# Patient Record
Sex: MALE | Race: BLACK OR AFRICAN AMERICAN | Employment: OTHER | URBAN - METROPOLITAN AREA
[De-identification: names, ages, dates, MRNs, and addresses within clinical notes are randomized per-mention and may not be internally consistent; named-entity substitution may affect disease eponyms.]

---

## 2017-01-05 ENCOUNTER — OFFICE VISIT (OUTPATIENT)
Dept: INTERNAL MEDICINE CLINIC | Age: 67
End: 2017-01-05

## 2017-01-05 VITALS
SYSTOLIC BLOOD PRESSURE: 146 MMHG | DIASTOLIC BLOOD PRESSURE: 78 MMHG | HEART RATE: 62 BPM | WEIGHT: 168 LBS | OXYGEN SATURATION: 98 % | BODY MASS INDEX: 24.88 KG/M2 | HEIGHT: 69 IN

## 2017-01-05 DIAGNOSIS — R94.31 EKG ABNORMALITY: ICD-10-CM

## 2017-01-05 DIAGNOSIS — N40.0 BENIGN NODULAR PROSTATIC HYPERPLASIA WITHOUT LOWER URINARY TRACT SYMPTOMS: ICD-10-CM

## 2017-01-05 DIAGNOSIS — Z13.31 SCREENING FOR DEPRESSION: ICD-10-CM

## 2017-01-05 DIAGNOSIS — J01.00 SUBACUTE MAXILLARY SINUSITIS: Primary | ICD-10-CM

## 2017-01-05 DIAGNOSIS — Z23 ENCOUNTER FOR IMMUNIZATION: ICD-10-CM

## 2017-01-05 DIAGNOSIS — E78.00 ELEVATED CHOLESTEROL: ICD-10-CM

## 2017-01-05 DIAGNOSIS — Z11.59 NEED FOR HEPATITIS C SCREENING TEST: ICD-10-CM

## 2017-01-05 DIAGNOSIS — Z12.5 SPECIAL SCREENING FOR MALIGNANT NEOPLASM OF PROSTATE: ICD-10-CM

## 2017-01-05 DIAGNOSIS — R06.00 DYSPNEA, UNSPECIFIED TYPE: ICD-10-CM

## 2017-01-05 RX ORDER — AMOXICILLIN 875 MG/1
875 TABLET, FILM COATED ORAL 2 TIMES DAILY
Qty: 20 TAB | Refills: 0 | Status: SHIPPED | OUTPATIENT
Start: 2017-01-05 | End: 2017-01-15

## 2017-01-05 NOTE — MR AVS SNAPSHOT
Visit Information Date & Time Provider Department Dept. Phone Encounter #  
 1/5/2017  9:00 AM Robert Musa MD [facility] Medicine and Primary Care [phone] [id number] Follow-up Instructions Return if symptoms worsen or fail to improve. Upcoming Health Maintenance Date Due Hepatitis C Screening 1950 DTaP/Tdap/Td series (1 - Tdap) 5/3/1971 FOBT Q 1 YEAR AGE 50-75 5/3/2000 ZOSTER VACCINE AGE 60> 5/3/2010 GLAUCOMA SCREENING Q2Y 5/3/2015 Pneumococcal 65+ Low/Medium Risk (1 of 2 - PCV13) 5/3/2015 MEDICARE YEARLY EXAM 5/3/2015 INFLUENZA AGE 9 TO ADULT 8/1/2016 Allergies as of 1/5/2017  Review Complete On: 1/5/2017 By: Robert Musa MD  
 No Known Allergies Current Immunizations  Never Reviewed Name Date Influenza High Dose Vaccine PF 1/5/2017 Not reviewed this visit You Were Diagnosed With   
  
 Codes Comments Routine general medical examination at a health care facility    -  Primary ICD-10-CM: Z00.00 ICD-9-CM: V70.0 Need for hepatitis C screening test     ICD-10-CM: Z11.59 
ICD-9-CM: V73.89 Screening for alcoholism     ICD-10-CM: Z13.89 ICD-9-CM: V79.1 Screening for depression     ICD-10-CM: Z13.89 ICD-9-CM: V79.0 Screen for colon cancer     ICD-10-CM: Z12.11 ICD-9-CM: V76.51 Screening for diabetes mellitus     ICD-10-CM: Z13.1 ICD-9-CM: V77.1 Screening for ischemic heart disease     ICD-10-CM: Z13.6 ICD-9-CM: V81.0 Special screening for malignant neoplasm of prostate     ICD-10-CM: Z12.5 ICD-9-CM: V76.44 Subacute maxillary sinusitis     ICD-10-CM: J01.00 ICD-9-CM: 461.0 Encounter for immunization     ICD-10-CM: E37 ICD-9-CM: V03.89 Vitals BP Pulse Height(growth percentile) Weight(growth percentile) SpO2 BMI  
 146/78 (BP 1 Location: Left arm, BP Patient Position: Sitting) 62 5' 9\" (1.753 m) 168 lb (76.2 kg) 98% 24.81 kg/m2 Smoking Status Current Some Day Smoker Vitals History BMI and BSA Data Body Mass Index Body Surface Area  
 24.81 kg/m 2 1.93 m 2 Preferred Pharmacy Pharmacy Name Phone [phone], [phone]  [phone] Your Updated Medication List  
  
   
This list is accurate as of: 1/5/17 10:45 AM.  Always use your most recent med list.  
  
  
  
  
 amoxicillin 875 mg tablet Commonly known as:  AMOXIL Take 1 Tab by mouth two (2) times a day for 10 days. Cholecalciferol (Vitamin D3) 2,000 unit Cap capsule Commonly known as:  VITAMIN D3 Take  by mouth two (2) times a day. CIALIS 20 mg tablet Generic drug:  tadalafil Take 20 mg by mouth as needed. FISH OIL 1,000 mg Cap Generic drug:  omega-3 fatty acids-vitamin e Take 1 Cap by mouth. FLONASE 50 mcg/actuation nasal spray Generic drug:  fluticasone 2 Sprays by Both Nostrils route daily. glucosamine-chondroitin 1,500-1,200 mg/30 mL Liqd Commonly known as:  ELATION Take  by mouth. JUBLIA Nettie topical solution Generic drug:  efinaconazole Apply  to affected area daily. meloxicam 15 mg tablet Commonly known as:  MOBIC Take 1 Tab by mouth daily. SKELAXIN 800 mg tablet Generic drug:  metaxalone Take  by mouth. VITAMIN C 500 mg tablet Generic drug:  ascorbic acid (vitamin C) Take 1,000 mg by mouth. Prescriptions Sent to Pharmacy Refills  
 amoxicillin (AMOXIL) 875 mg tablet 0 Sig: Take 1 Tab by mouth two (2) times a day for 10 days. Class: Normal  
 Pharmacy: [facility], [address] [place] [address] #: [phone] Route: Oral  
  
Follow-up Instructions Return if symptoms worsen or fail to improve. Introducing [facility] & HEALTH SERVICES! Jacky Gonzales introduces Flowity patient portal. Now you can access parts of your medical record, email your doctor's office, and request medication refills online. 1. In your internet browser, go to [email or web address] 2. Click on the First Time User? Click Here link in the Sign In box. You will see the New Member Sign Up page. 3. Enter your Flowity Access Code exactly as it appears below. You will not need to use this code after youve completed the sign-up process. If you do not sign up before the expiration date, you must request a new code. · Flowity Access Code: [email or web address] Expires: 2/15/2017  2:05 PM 
 
4. Enter the last four digits of your Social Security Number (xxxx) and Date of Birth (mm/dd/yyyy) as indicated and click Submit. You will be taken to the next sign-up page. 5. Create a Flowity ID. This will be your Flowity login ID and cannot be changed, so think of one that is secure and easy to remember. 6. Create a Flowity password. You can change your password at any time. 7. Enter your Password Reset Question and Answer. This can be used at a later time if you forget your password. 8. Enter your e-mail address. You will receive e-mail notification when new information is available in [address] 9. Click Sign Up. You can now view and download portions of your medical record. 10. Click the Download Summary menu link to download a portable copy of your medical information. If you have questions, please visit the Frequently Asked Questions section of the Flowity website. Remember, Flowity is NOT to be used for urgent needs. For medical emergencies, dial 911. Now available from your iPhone and Android! Please provide this summary of care documentation to your next provider. Your primary care clinician is listed as Jazzy Temple. If you have any questions after today's visit, please call [phone].

## 2017-01-05 NOTE — PROGRESS NOTES
CC:  URI    Pt here for a URI. He complains of congestion, sore throat, nasal blockage and post nasal drip for 1 weeks. No fevers. no nausea and no vomiting . No pain while swallowing and white spots in throat. Over-the-counter remedies including none   . Hx Asthma:  no  Smoker:  no  Contacts with similar infections: no   Recent travel:no     History   History reviewed. No pertinent past medical history. History reviewed. No pertinent past surgical history. Current Outpatient Prescriptions   Medication Sig Dispense Refill    fluticasone (FLONASE) 50 mcg/actuation nasal spray 2 Sprays by Both Nostrils route daily.  efinaconazole (JUBLIA) erlinda topical solution Apply  to affected area daily.  Cholecalciferol, Vitamin D3, (VITAMIN D3) 2,000 unit cap capsule Take  by mouth two (2) times a day.  omega-3 fatty acids-vitamin e (FISH OIL) 1,000 mg cap Take 1 Cap by mouth.  ascorbic acid, vitamin C, (VITAMIN C) 500 mg tablet Take 1,000 mg by mouth.  glucosamine-chondroitin (ELATION) 1,500-1,200 mg/30 mL liqd Take  by mouth.  meloxicam (MOBIC) 15 mg tablet Take 1 Tab by mouth daily. 30 Tab 1    tadalafil (CIALIS) 20 mg tablet Take 20 mg by mouth as needed.  metaxalone (SKELAXIN) 800 mg tablet Take  by mouth. No Known Allergies  History reviewed. No pertinent family history. Social History   Substance Use Topics    Smoking status: Current Some Day Smoker     Types: Cigars    Smokeless tobacco: Current User    Alcohol use 1.2 oz/week     2 Glasses of wine per week     There is no problem list on file for this patient. Depression Risk Factor Screening:   No flowsheet data found. Alcohol Risk Factor Screening: On any occasion during the past 3 months, have you had more than 4 drinks containing alcohol? No    Do you average more than 14 drinks per week?   No    Functional Ability and Level of Safety:     Hearing Loss   mild    Activities of Daily Living Self-care. Requires assistance with: no ADLs    Fall Risk   No flowsheet data found. Abuse Screen   Patient is not abused    Review of Systems   A comprehensive review of systems was negative except for that written in the HPI. Physical Examination     No exam data present    Evaluation of Cognitive Function:  Mood/affect:  happy  Appearance: age appropriate  Family member/caregiver input: na    Visit Vitals    /78 (BP 1 Location: Left arm, BP Patient Position: Sitting)    Pulse 62    Ht 5' 9\" (1.753 m)    Wt 168 lb (76.2 kg)    SpO2 98%    BMI 24.81 kg/m2     General:  Alert, cooperative, no distress, appears stated age. Head:  Normocephalic, without obvious abnormality, atraumatic. Eyes:  Conjunctivae/corneas clear. PERRL, EOMs intact. Fundi benign   Ears:  Normal TMs and external ear canals both ears. Nose: Nares normal. Septum midline. Mucosa normal. No drainage or sinus tenderness. Throat: Lips, mucosa, and tongue normal. Teeth and gums normal.   Neck: Supple, symmetrical, trachea midline, no adenopathy, thyroid: no enlargement/tenderness/nodules, no carotid bruit and no JVD. Back:   Symmetric, no curvature. ROM normal. No CVA tenderness. Lungs:   Clear to auscultation bilaterally. Chest wall:  No tenderness or deformity. Heart:  Regular rate and rhythm, S1, S2 normal, no murmur, click, rub or gallop. Abdomen:   Soft, non-tender. Bowel sounds normal. No masses,  No organomegaly. Genitalia:  Normal male without lesion, discharge or tenderness. Rectal:  Normal tone, enlarged prostate, no masses or tenderness  Guaiac negative stool. Extremities: Extremities normal, atraumatic, no cyanosis or edema. Pulses: 2+ and symmetric all extremities. Skin: Skin color, texture, turgor normal. No rashes or lesions   Lymph nodes: Cervical, supraclavicular, and axillary nodes normal.   Neurologic: CNII-XII intact. Normal strength, sensation and reflexes throughout.        Patient Care Team:  Ting Hernandez MD as PCP - [facility])    Advice/Referrals/Counseling   Education and counseling provided:  Are appropriate based on today's review and evaluation  End-of-Life planning (with patient's consent)  Influenza Vaccine    Assessment/Plan       ICD-10-CM ICD-9-CM    1. Subacute maxillary sinusitis J01.00 461.0    2. Benign nodular prostatic hyperplasia without lower urinary tract symptoms N40.0 600.10    3. Need for hepatitis C screening test Z11.59 V73.89 HEPATITIS C AB   4. Screening for depression Z13.89 V79.0 DEPRESSION SCREEN ANNUAL   5. Special screening for malignant neoplasm of prostate Z12.5 V76.44 NE PROSTATE CA SCREENING; CHRISTY   6. Encounter for immunization Z23 V03.89 INFLUENZA VIRUS VACCINE, HIGH DOSE SEASONAL, PRESERVATIVE FREE   7. Dyspnea, unspecified type R06.00 786.09 STRESS TEST CARDIAC   8. Elevated cholesterol E78.00 272.0 LIPID PANEL   9. EKG abnormality R94.31 794.31 AMB POC EKG ROUTINE W/ 12 LEADS, SCREEN ()      STRESS TEST CARDIAC   .

## 2017-01-06 LAB
CHOLEST SERPL-MCNC: 257 MG/DL (ref 100–199)
HCV AB S/CO SERPL IA: <0.1 S/CO RATIO (ref 0–0.9)
HDLC SERPL-MCNC: 79 MG/DL
LDLC SERPL CALC-MCNC: 162 MG/DL (ref 0–99)
TRIGL SERPL-MCNC: 81 MG/DL (ref 0–149)
VLDLC SERPL CALC-MCNC: 16 MG/DL (ref 5–40)

## 2017-01-13 ENCOUNTER — HOSPITAL ENCOUNTER (OUTPATIENT)
Dept: NON INVASIVE DIAGNOSTICS | Age: 67
Discharge: HOME | End: 2017-01-13
Attending: FAMILY MEDICINE
Payer: MEDICARE

## 2017-01-13 DIAGNOSIS — R06.00 DYSPNEA, UNSPECIFIED TYPE: ICD-10-CM

## 2017-01-13 DIAGNOSIS — R94.31 EKG ABNORMALITY: ICD-10-CM

## 2017-01-13 LAB
ATTENDING PHYSICIAN, CST07: NORMAL
DIAGNOSIS, 93000: NORMAL
DUKE TM SCORE RESULT, CST14: NORMAL
DUKE TREADMILL SCORE, CST13: NORMAL
ECG INTERP BEFORE EX, CST11: NORMAL
ECG INTERP DURING EX, CST12: NORMAL
FUNCTIONAL CAPACITY, CST17: NORMAL
KNOWN CARDIAC CONDITION, CST08: NORMAL
MAX. DIASTOLIC BP, CST04: 76 MMHG
MAX. HEART RATE, CST05: 179 BPM
MAX. SYSTOLIC BP, CST03: 188 MMHG
OVERALL BP RESPONSE TO EXERCISE, CST16: NORMAL
OVERALL HR RESPONSE TO EXERCISE, CST15: NORMAL
PEAK EX METS, CST10: 11.7 METS
PROTOCOL NAME, CST01: NORMAL
TEST INDICATION, CST09: NORMAL

## 2017-01-13 PROCEDURE — 93017 CV STRESS TEST TRACING ONLY: CPT

## 2017-01-17 ENCOUNTER — TELEPHONE (OUTPATIENT)
Dept: INTERNAL MEDICINE CLINIC | Age: 67
End: 2017-01-17

## 2017-01-17 NOTE — TELEPHONE ENCOUNTER
----- Message from Carl Rosario sent at 1/17/2017 10:41 AM EST -----  Regarding: Dr. Vic Marquis [phone]     Pt would like a call back with his lab results from last visit on 01/05/17.

## 2017-01-19 ENCOUNTER — OFFICE VISIT (OUTPATIENT)
Dept: INTERNAL MEDICINE CLINIC | Age: 67
End: 2017-01-19

## 2017-01-19 VITALS
OXYGEN SATURATION: 98 % | TEMPERATURE: 98 F | HEIGHT: 69 IN | HEART RATE: 68 BPM | WEIGHT: 170.8 LBS | DIASTOLIC BLOOD PRESSURE: 77 MMHG | SYSTOLIC BLOOD PRESSURE: 130 MMHG | BODY MASS INDEX: 25.3 KG/M2

## 2017-01-19 DIAGNOSIS — E78.5 HYPERLIPIDEMIA, UNSPECIFIED HYPERLIPIDEMIA TYPE: Primary | ICD-10-CM

## 2017-01-19 RX ORDER — ROSUVASTATIN CALCIUM 5 MG/1
5 TABLET, COATED ORAL
Qty: 30 TAB | Refills: 3 | Status: SHIPPED | OUTPATIENT
Start: 2017-01-19 | End: 2017-07-06 | Stop reason: SDUPTHER

## 2017-01-19 NOTE — MR AVS SNAPSHOT
Visit Information Date & Time Provider Department Dept. Phone Encounter #  
 1/19/2017 10:00 AM Jelena Christine MD [facility] [place] [id number] Follow-up Instructions Return in about 6 months (around 7/19/2017) for cholesterol check. Follow-up and Disposition History Upcoming Health Maintenance Date Due DTaP/Tdap/Td series (1 - Tdap) 5/3/1971 FOBT Q 1 YEAR AGE 50-75 5/3/2000 ZOSTER VACCINE AGE 60> 5/3/2010 GLAUCOMA SCREENING Q2Y 5/3/2015 Pneumococcal 65+ Low/Medium Risk (1 of 2 - PCV13) 5/3/2015 MEDICARE YEARLY EXAM 5/3/2015 Allergies as of 1/19/2017  Review Complete On: 1/19/2017 By: Jelena Christine MD  
 No Known Allergies Current Immunizations  Never Reviewed Name Date Influenza High Dose Vaccine PF 1/5/2017 Not reviewed this visit You Were Diagnosed With   
  
 Codes Comments Hyperlipidemia, unspecified hyperlipidemia type    -  Primary ICD-10-CM: E78.5 ICD-9-CM: 272.4 Vitals BP Pulse Temp Height(growth percentile) Weight(growth percentile) SpO2  
 130/77 (BP 1 Location: Left arm, BP Patient Position: Sitting) 68 98 °F (36.7 °C) (Oral) 5' 9\" (1.753 m) 170 lb 12.8 oz (77.5 kg) 98% BMI Smoking Status 25.22 kg/m2 Current Some Day Smoker Vitals History BMI and BSA Data Body Mass Index Body Surface Area  
 25.22 kg/m 2 1.94 m 2 Preferred Pharmacy Pharmacy Name Phone [address], [facility]  Shawn Gonzalez [phone] Your Updated Medication List  
  
   
This list is accurate as of: 1/19/17 10:37 AM.  Always use your most recent med list.  
  
  
  
  
 Cholecalciferol (Vitamin D3) 2,000 unit Cap capsule Commonly known as:  VITAMIN D3 Take  by mouth two (2) times a day. CIALIS 20 mg tablet Generic drug:  tadalafil Take 20 mg by mouth as needed. FISH OIL 1,000 mg Cap Generic drug:  omega-3 fatty acids-vitamin e Take 1 Cap by mouth. FLONASE 50 mcg/actuation nasal spray Generic drug:  fluticasone 2 Sprays by Both Nostrils route daily. glucosamine-chondroitin 1,500-1,200 mg/30 mL Liqd Commonly known as:  ELATION Take  by mouth. JUBLIA Nettie topical solution Generic drug:  efinaconazole Apply  to affected area daily. meloxicam 15 mg tablet Commonly known as:  MOBIC Take 1 Tab by mouth daily. rosuvastatin 5 mg tablet Commonly known as:  CRESTOR Take 1 Tab by mouth nightly. SKELAXIN 800 mg tablet Generic drug:  metaxalone Take  by mouth. VITAMIN C 500 mg tablet Generic drug:  ascorbic acid (vitamin C) Take 1,000 mg by mouth. Prescriptions Sent to Pharmacy Refills  
 rosuvastatin (CRESTOR) 5 mg tablet 3 Sig: Take 1 Tab by mouth nightly. Class: Normal  
 Pharmacy: [facility] [address] #: [phone] Route: Oral  
  
We Performed the Following METABOLIC PANEL, COMPREHENSIVE [91968 CPT(R)] Follow-up Instructions Return in about 6 months (around 7/19/2017) for cholesterol check. Introducing [facility] & HEALTH SERVICES! [employer] introduces [employer] patient portal. Now you can access parts of your medical record, email your doctor's office, and request medication refills online. 1. In your internet browser, go to [email or web address] 2. Click on the First Time User? Click Here link in the Sign In box. You will see the New Member Sign Up page. 3. Enter your [employer] Access Code exactly as it appears below. You will not need to use this code after youve completed the sign-up process. If you do not sign up before the expiration date, you must request a new code. · [employer] Access Code: [email or web address] Expires: 2/15/2017  2:05 PM 
 
 4. Enter the last four digits of your Social Security Number (xxxx) and Date of Birth (mm/dd/yyyy) as indicated and click Submit. You will be taken to the next sign-up page. 5. Create a Keystok ID. This will be your Keystok login ID and cannot be changed, so think of one that is secure and easy to remember. 6. Create a Keystok password. You can change your password at any time. 7. Enter your Password Reset Question and Answer. This can be used at a later time if you forget your password. 8. Enter your e-mail address. You will receive e-mail notification when new information is available in [address] 9. Click Sign Up. You can now view and download portions of your medical record. 10. Click the Download Summary menu link to download a portable copy of your medical information. If you have questions, please visit the Frequently Asked Questions section of the Keystok website. Remember, Keystok is NOT to be used for urgent needs. For medical emergencies, dial 911. Now available from your iPhone and Android! Please provide this summary of care documentation to your next provider. Your primary care clinician is listed as [address] [place] [address] . If you have any questions after today's visit, please call [phone].

## 2017-01-19 NOTE — PROGRESS NOTES
Chief Complaint   Patient presents with    Cholesterol Problem     follow up    Cardiac Testing     discuss results     he is a 77y.o. year old male who presents for follow-up of   hyperlipidemia. Cardiovascular risk analysis - ASVC - 12. Compliance with treatment thus far has been NA. New concerns: none. Social History, Diet and Lifestyle: generally follows a low fat low cholesterol diet      Lab Results  Component Value Date/Time   Cholesterol, total 257 01/05/2017 10:29 AM   HDL Cholesterol 79 01/05/2017 10:29 AM   LDL, calculated 162 01/05/2017 10:29 AM   Triglyceride 81 01/05/2017 10:29 AM          ROS: taking medications as instructed, no medication side effects noted, no TIA's, no chest pain on exertion, no dyspnea on exertion, no swelling of ankles. Reviewed and agree with Nurse Note and duplicated in this note. Reviewed PmHx, RxHx, FmHx, SocHx, AllgHx and updated and dated in the chart. No family history on file. No past medical history on file. Social History     Social History    Marital status:      Spouse name: N/A    Number of children: N/A    Years of education: N/A     Social History Main Topics    Smoking status: Current Some Day Smoker     Types: Cigars    Smokeless tobacco: Current User    Alcohol use 1.2 oz/week     2 Glasses of wine per week    Drug use: No    Sexual activity: Not on file     Other Topics Concern    Not on file     Social History Narrative      Patient Active Problem List    Diagnosis Date Noted    Hyperlipidemia 01/19/2017     Current Outpatient Prescriptions   Medication Sig Dispense Refill    rosuvastatin (CRESTOR) 5 mg tablet Take 1 Tab by mouth nightly. 30 Tab 3    fluticasone (FLONASE) 50 mcg/actuation nasal spray 2 Sprays by Both Nostrils route daily.  efinaconazole (JUBLIA) erlinda topical solution Apply  to affected area daily.       Cholecalciferol, Vitamin D3, (VITAMIN D3) 2,000 unit cap capsule Take  by mouth two (2) times a day.  omega-3 fatty acids-vitamin e (FISH OIL) 1,000 mg cap Take 1 Cap by mouth.  ascorbic acid, vitamin C, (VITAMIN C) 500 mg tablet Take 1,000 mg by mouth.  glucosamine-chondroitin (ELATION) 1,500-1,200 mg/30 mL liqd Take  by mouth.  tadalafil (CIALIS) 20 mg tablet Take 20 mg by mouth as needed.  metaxalone (SKELAXIN) 800 mg tablet Take  by mouth.  meloxicam (MOBIC) 15 mg tablet Take 1 Tab by mouth daily. 30 Tab 1     No Known Allergies  No past medical history on file. No past surgical history on file. No family history on file. Objective:     Vitals:    01/19/17 1001   Weight: 77.5 kg (170 lb 12.8 oz)   Height: 5' 9\" (1.753 m)       Physical Examination: General appearance - alert, well appearing, and in no distress  Eyes - pupils equal and reactive, extraocular eye movements intact  Ears - bilateral TM's and external ear canals normal  Nose - normal and patent, no erythema, discharge or polyps  Mouth - mucous membranes moist, pharynx normal without lesions  Neck - supple, no significant adenopathy  Chest - clear to auscultation, no wheezes, rales or rhonchi, symmetric air entry  Heart - normal rate, regular rhythm, normal S1, S2, no murmurs, rubs, clicks or gallops  Abdomen - soft, nontender, nondistended, no masses or organomegaly  Back exam - full range of motion, no tenderness, palpable spasm or pain on motion  Neurological - alert, oriented, normal speech, no focal findings or movement disorder noted  Musculoskeletal - no joint tenderness, deformity or swelling  Extremities - peripheral pulses normal, no pedal edema, no clubbing or cyanosis  Skin - normal coloration and turgor, no rashes, no suspicious skin lesions noted    Assessment/ Plan:   Bettie Pina was seen today for cholesterol problem and cardiac testing.     Diagnoses and all orders for this visit:    Hyperlipidemia, unspecified hyperlipidemia type  -     METABOLIC PANEL, COMPREHENSIVE    Other orders  - rosuvastatin (CRESTOR) 5 mg tablet; Take 1 Tab by mouth nightly. Follow-up Disposition:  Return in about 6 months (around 7/19/2017) for cholesterol check. I have discussed the diagnosis with the patient and the intended plan as seen in the above orders. The patient has received an after-visit summary and questions were answered concerning future plans. Medication Side Effects and Warnings were discussed with patient: yes  Patient Labs were reviewed and or requested: yes  Patient Past Records were reviewed and or requested  yes  I have discussed the diagnosis with the patient and the intended plan as seen in the above orders. The patient has received an after-visit summary and questions were answered concerning future plans. Pt agrees to call or return to clinic and/or go to closest ER with any worsening of symptoms. This may include, but not limited to increased fever (>100.4) with NSAIDS or Tylenol, increased edema, confusion, rash, worsening of presenting symptoms. 1) Remember to stay active and/or exercise regularly (I suggest 30-45 minutes daily)   2) For reliable dietary information, go to www. EATRIGHT.org. You may wish to consider seeing the nutritionist at [facility] at #[phone] or [phone], also consider the [id number] [facility].   3) I routinely suggest a complete physical exam once each year (your birth month)

## 2017-01-20 LAB
ALBUMIN SERPL-MCNC: 4.4 G/DL (ref 3.6–4.8)
ALBUMIN/GLOB SERPL: 1.9 {RATIO} (ref 1.1–2.5)
ALP SERPL-CCNC: 67 IU/L (ref 39–117)
ALT SERPL-CCNC: 31 IU/L (ref 0–44)
AST SERPL-CCNC: 23 IU/L (ref 0–40)
BILIRUB SERPL-MCNC: 0.4 MG/DL (ref 0–1.2)
BUN SERPL-MCNC: 13 MG/DL (ref 8–27)
BUN/CREAT SERPL: 14 (ref 10–22)
CALCIUM SERPL-MCNC: 9.6 MG/DL (ref 8.6–10.2)
CHLORIDE SERPL-SCNC: 106 MMOL/L (ref 96–106)
CO2 SERPL-SCNC: 22 MMOL/L (ref 18–29)
CREAT SERPL-MCNC: 0.95 MG/DL (ref 0.76–1.27)
GLOBULIN SER CALC-MCNC: 2.3 G/DL (ref 1.5–4.5)
GLUCOSE SERPL-MCNC: 81 MG/DL (ref 65–99)
POTASSIUM SERPL-SCNC: 4.6 MMOL/L (ref 3.5–5.2)
PROT SERPL-MCNC: 6.7 G/DL (ref 6–8.5)
SODIUM SERPL-SCNC: 148 MMOL/L (ref 134–144)

## 2017-04-11 ENCOUNTER — HOSPITAL ENCOUNTER (OUTPATIENT)
Dept: GENERAL RADIOLOGY | Age: 67
Discharge: HOME | End: 2017-04-11
Payer: MEDICARE

## 2017-04-11 ENCOUNTER — OFFICE VISIT (OUTPATIENT)
Dept: INTERNAL MEDICINE CLINIC | Age: 67
End: 2017-04-11

## 2017-04-11 VITALS
BODY MASS INDEX: 24.59 KG/M2 | RESPIRATION RATE: 16 BRPM | HEART RATE: 64 BPM | SYSTOLIC BLOOD PRESSURE: 133 MMHG | DIASTOLIC BLOOD PRESSURE: 78 MMHG | TEMPERATURE: 98.2 F | WEIGHT: 166 LBS | HEIGHT: 69 IN

## 2017-04-11 DIAGNOSIS — K21.9 GASTROESOPHAGEAL REFLUX DISEASE WITHOUT ESOPHAGITIS: ICD-10-CM

## 2017-04-11 DIAGNOSIS — M25.50 MULTIPLE JOINT PAIN: ICD-10-CM

## 2017-04-11 DIAGNOSIS — M25.50 MULTIPLE JOINT PAIN: Primary | ICD-10-CM

## 2017-04-11 PROCEDURE — 73130 X-RAY EXAM OF HAND: CPT

## 2017-04-11 PROCEDURE — 73660 X-RAY EXAM OF TOE(S): CPT

## 2017-04-11 PROCEDURE — 73080 X-RAY EXAM OF ELBOW: CPT

## 2017-04-11 RX ORDER — OMEPRAZOLE 40 MG/1
40 CAPSULE, DELAYED RELEASE ORAL DAILY
Qty: 30 CAP | Refills: 0 | Status: SHIPPED | OUTPATIENT
Start: 2017-04-11 | End: 2017-07-03 | Stop reason: ALTCHOICE

## 2017-04-11 NOTE — MR AVS SNAPSHOT
Visit Information Date & Time Provider Department Dept. Phone Encounter #  
 4/11/2017 11:15 AM [address] [place] [address] MD Carlos [facility] [phone] [id number] Your Appointments 7/11/2017  8:15 AM  
ROUTINE CARE with [address] [place] [address] MD Carlos  
[address] [place] [address] and Primary Care [facility]) Appt Note: 6 month f/u  
 [address] [facility]  
  
   
 [phone] [phone] Upcoming Health Maintenance Date Due DTaP/Tdap/Td series (1 - Tdap) 5/3/1971 FOBT Q 1 YEAR AGE 50-75 5/3/2000 ZOSTER VACCINE AGE 60> 5/3/2010 GLAUCOMA SCREENING Q2Y 5/3/2015 Pneumococcal 65+ Low/Medium Risk (1 of 2 - PCV13) 5/3/2015 MEDICARE YEARLY EXAM 5/3/2015 Allergies as of 4/11/2017  Review Complete On: 4/11/2017 By: [address] [place] [address] MD Carlos  
 No Known Allergies Current Immunizations  Never Reviewed Name Date Influenza High Dose Vaccine PF 1/5/2017 Not reviewed this visit You Were Diagnosed With   
  
 Codes Comments Multiple joint pain    -  Primary ICD-10-CM: M25.50 ICD-9-CM: 719.49 Gastroesophageal reflux disease without esophagitis     ICD-10-CM: K21.9 ICD-9-CM: 530.81 Vitals BP Pulse Temp Resp Height(growth percentile) Weight(growth percentile) 133/78 64 98.2 °F (36.8 °C) 16 5' 9\" (1.753 m) 166 lb (75.3 kg) BMI Smoking Status 24.51 kg/m2 Current Some Day Smoker BMI and BSA Data Body Mass Index Body Surface Area 24.51 kg/m 2 1.91 m 2 Preferred Pharmacy Pharmacy Name Phone [address], [address] [phone] Your Updated Medication List  
  
   
This list is accurate as of: 4/11/17 11:59 AM.  Always use your most recent med list.  
  
  
  
  
 Cholecalciferol (Vitamin D3) 2,000 unit Cap capsule Commonly known as:  VITAMIN D3  
 Take  by mouth two (2) times a day. CIALIS 20 mg tablet Generic drug:  tadalafil Take 20 mg by mouth as needed. FISH OIL 1,000 mg Cap Generic drug:  omega-3 fatty acids-vitamin e Take 1 Cap by mouth. FLONASE 50 mcg/actuation nasal spray Generic drug:  fluticasone 2 Sprays by Both Nostrils route daily. glucosamine-chondroitin 1,500-1,200 mg/30 mL Liqd Commonly known as:  ELATION Take  by mouth. JUBLIA Nettie topical solution Generic drug:  efinaconazole Apply  to affected area daily. meloxicam 15 mg tablet Commonly known as:  MOBIC Take 1 Tab by mouth daily. omeprazole 40 mg capsule Commonly known as:  PRILOSEC Take 1 Cap by mouth daily. rosuvastatin 5 mg tablet Commonly known as:  CRESTOR Take 1 Tab by mouth nightly. SKELAXIN 800 mg tablet Generic drug:  metaxalone Take  by mouth. VITAMIN C 500 mg tablet Generic drug:  ascorbic acid (vitamin C) Take 1,000 mg by mouth. Prescriptions Sent to Pharmacy Refills  
 omeprazole (PRILOSEC) 40 mg capsule 0 Sig: Take 1 Cap by mouth daily. Class: Normal  
 Pharmacy: [facility] [address] [place] [address] #: [phone] Route: Oral  
  
We Performed the Following CK [id number] CPT(R)] [address], IGG [id number] CPT(R)] RHEUMATOID FACTOR, QL [id number] CPT(R)] URIC ACID [id number] CPT(R)] To-Do List   
 04/11/2017 Imaging:  XR ELBOW RT MIN 3 V   
  
 04/11/2017 Imaging:  XR GREAT TOE LT MIN 2 V   
  
 04/11/2017 Imaging:  XR GREAT TOE RT MIN 2 V   
  
 04/11/2017 Imaging:  XR HAND LT MIN 3 V   
  
 04/11/2017 Imaging:  XR HAND RT MIN 3 V [facility]! Dear Yasmine Duarte: 
Thank you for requesting a [employer] account. Our records indicate that you already have an active [employer] account.   You can access your account anytime at [email or web address] Did you know that you can access your hospital and ER discharge instructions at any time in [employer]â„[employer]ision? You can also review all of your test results from your hospital stay or ER visit. Additional Information If you have questions, please visit the Frequently Asked Questions section of the PrimÃ¢â‚¬â„¢Vision website at [email or web address] Remember, PrimÃ¢â‚¬â„[employer] is NOT to be used for urgent needs. For medical emergencies, dial 911. Now available from your iPhone and Android! Please provide this summary of care documentation to your next provider. Your primary care clinician is listed as Nia Hernandez. If you have any questions after today's visit, please call [phone].

## 2017-04-11 NOTE — PROGRESS NOTES
Chief Complaint   Patient presents with    Joint Pain     feels it may be from crestor     he is a 77y.o. year old male who presents for evaluation of joint pain and back pain  Pain Assessment Encounter      Aleks Chou  4/11/2017  Onset of Symptoms: a couple months  ________________________________________________________________________  Description:aching    Frequency: more than 5 times a day  Pain Scale:(1-10): 9  Trauma Hx: no  Hx of similar symptoms: No:   Radiation: no  Duration:  continuous      Progression: is unchanged  What makes it better?: heat  What makes it worse?:none  Medications tried: ibuprofen    Reviewed and agree with Nurse Note and duplicated in this note. Reviewed PmHx, RxHx, FmHx, SocHx, AllgHx and updated and dated in the chart. No family history on file. No past medical history on file.    Social History     Social History    Marital status:      Spouse name: N/A    Number of children: N/A    Years of education: N/A     Social History Main Topics    Smoking status: Current Some Day Smoker     Types: Cigars    Smokeless tobacco: Current User    Alcohol use 1.2 oz/week     2 Glasses of wine per week    Drug use: No    Sexual activity: Not on file     Other Topics Concern    Not on file     Social History Narrative        Review of Systems - negative except as listed above      Objective:     Vitals:    04/11/17 1117   BP: 133/78   Pulse: 64   Resp: 16   Temp: 98.2 °F (36.8 °C)   Weight: 166 lb (75.3 kg)   Height: 5' 9\" (1.753 m)       Physical Examination: General appearance - alert, well appearing, and in no distress  Eyes - pupils equal and reactive, extraocular eye movements intact  Ears - bilateral TM's and external ear canals normal  Nose - normal and patent, no erythema, discharge or polyps  Mouth - mucous membranes moist, pharynx normal without lesions  Neck - supple, no significant adenopathy  Back exam - full range of motion, no tenderness, palpable spasm or pain on motion  Neurological - alert, oriented, normal speech, no focal findings or movement disorder noted  Musculoskeletal - no joint tenderness, deformity or swelling of digits, slight pain with palpation of bilateral mtp 1st toes  Extremities - peripheral pulses normal, no pedal edema, no clubbing or cyanosis  Skin - normal coloration and turgor, no rashes, no suspicious skin lesions noted    Assessment/ Plan:   Shala Vaca was seen today for joint pain. Diagnoses and all orders for this visit:    Multiple joint pain  -     XR GREAT TOE LT MIN 2 V; Future  -     XR GREAT TOE RT MIN 2 V; Future  -     XR HAND LT MIN 3 V; Future  -     XR HAND RT MIN 3 V; Future  -     XR ELBOW RT MIN 3 V; Future  -     RHEUMATOID FACTOR, QL  -     CYCLIC CITRUL PEPTIDE AB, IGG  -     CK  -     URIC ACID    Gastroesophageal reflux disease without esophagitis    Other orders  -     omeprazole (PRILOSEC) 40 mg capsule; Take 1 Cap by mouth daily. Pathophysiology, recovery and rehabilitation process discussed and questions answered   Counseling for 30 Minutes of the total visit duration   Pictures and figures used as necessary   Provided reassurance   Recommend activity modification   Recommend  lower impact activities-walking, Eliptical, Nordic Track, cycling or swimming   Follow up in 4 week(s)           I have discussed the diagnosis with the patient and the intended plan as seen in the above orders. The patient has received an after-visit summary and questions were answered concerning future plans. Medication Side Effects and Warnings were discussed with patient: yes  Patient Labs were reviewed and or requested: yes  Patient Past Records were reviewed and or requested  yes  I have discussed the diagnosis with the patient and the intended plan as seen in the above orders. The patient has received an after-visit summary and questions were answered concerning future plans.      Pt agrees to call or return to clinic and/or go to closest ER with any worsening of symptoms. This may include, but not limited to increased fever (>100.4) with NSAIDS or Tylenol, increased edema, confusion, rash, worsening of presenting symptoms. 1) Remember to stay active and/or exercise regularly (I suggest 30-45 minutes daily)   2) For reliable dietary information, go to www. EATRIGHT.org. You may wish to consider seeing the nutritionist at [facility] at #[phone] or [phone], also consider the [address] [facility].   3) I routinely suggest a complete physical exam once each year (your birth month)

## 2017-04-13 LAB
CCP IGA+IGG SERPL IA-ACNC: 7 UNITS (ref 0–19)
CK SERPL-CCNC: 193 U/L (ref 24–204)
RHEUMATOID FACT SERPL-ACNC: <10 IU/ML (ref 0–13.9)
URATE SERPL-MCNC: 4.6 MG/DL (ref 3.7–8.6)

## 2017-07-03 ENCOUNTER — OFFICE VISIT (OUTPATIENT)
Dept: INTERNAL MEDICINE CLINIC | Age: 67
End: 2017-07-03

## 2017-07-03 VITALS
DIASTOLIC BLOOD PRESSURE: 81 MMHG | HEIGHT: 69 IN | TEMPERATURE: 98.4 F | WEIGHT: 165 LBS | SYSTOLIC BLOOD PRESSURE: 147 MMHG | BODY MASS INDEX: 24.44 KG/M2 | RESPIRATION RATE: 14 BRPM | HEART RATE: 67 BPM

## 2017-07-03 DIAGNOSIS — Z00.00 ROUTINE GENERAL MEDICAL EXAMINATION AT A HEALTH CARE FACILITY: ICD-10-CM

## 2017-07-03 DIAGNOSIS — H93.8X3 EAR CONGESTION, BILATERAL: ICD-10-CM

## 2017-07-03 DIAGNOSIS — Z13.39 SCREENING FOR ALCOHOLISM: ICD-10-CM

## 2017-07-03 DIAGNOSIS — T67.5XXA HEAT EXHAUSTION, INITIAL ENCOUNTER: ICD-10-CM

## 2017-07-03 DIAGNOSIS — E78.5 HYPERLIPIDEMIA, UNSPECIFIED HYPERLIPIDEMIA TYPE: Primary | ICD-10-CM

## 2017-07-03 DIAGNOSIS — Z71.89 ACP (ADVANCE CARE PLANNING): ICD-10-CM

## 2017-07-03 DIAGNOSIS — Z12.5 SPECIAL SCREENING FOR MALIGNANT NEOPLASM OF PROSTATE: ICD-10-CM

## 2017-07-03 DIAGNOSIS — R06.02 SOB (SHORTNESS OF BREATH) ON EXERTION: ICD-10-CM

## 2017-07-03 NOTE — ACP (ADVANCE CARE PLANNING)
No flowsheet data found. Advance Care Planning (ACP) Provider Note - Comprehensive     Date of ACP Conversation: 07/03/17  Persons included in Conversation:  patient  Length of ACP Conversation in minutes:  <16 minutes (Non-Billable)    Authorized Decision Maker (if patient is incapable of making informed decisions): This person is:  Healthcare Agent/Medical Power of  under Advance Directive          General ACP for ALL Patients with Decision Making Capacity:   Importance of advance care planning, including choosing a healthcare agent to communicate patient's healthcare decisions if patient lost the ability to make decisions, such as after a sudden illness or accident  Understanding of the healthcare agent role was assessed and information provided    Review of Existing Advance Directive:  What information were you given about medical decisions to consider before completing your advance directive? forms  What is your understanding of your agent's willingness to honor your wishes, even if he/she may not agree with them? none  Does this advance directive still reflect your preferences? What information were you given about medical decisions to consider before completing your advance directive? none  What is your understanding of your agent's willingness to honor your wishes, even if he/she may not agree with them? none  Does this advance directive still reflect your preferences?   Yes (Provide new form/Refer for assistance in updating) (Provide new form/Refer for assistance in updating)    For Serious or Chronic Illness:  No known illness    Interventions Provided:  Recommended completion of Advance Directive form after review of ACP materials and conversation with prospective healthcare agent

## 2017-07-03 NOTE — PROGRESS NOTES
Chief Complaint   Patient presents with    Cholesterol Problem     follow-up    Annual Wellness Visit     he is a 79y.o. year old male who presents for follow-up of   hyperlipidemia. Compliance with treatment thus far has been unsatisfactory. New concerns: URI. Social History, Diet and Lifestyle: generally follows a low fat low cholesterol diet      Lab Results  Component Value Date/Time   Cholesterol, total 257 01/05/2017 10:29 AM   HDL Cholesterol 79 01/05/2017 10:29 AM   LDL, calculated 162 01/05/2017 10:29 AM   Triglyceride 81 01/05/2017 10:29 AM          ROS: taking medications as instructed, no medication side effects noted, no TIA's, no chest pain on exertion, no dyspnea on exertion, no swelling of ankles. Reviewed and agree with Nurse Note and duplicated in this note. Reviewed PmHx, RxHx, FmHx, SocHx, AllgHx and updated and dated in the chart. No family history on file. No past medical history on file. Social History     Social History    Marital status:      Spouse name: N/A    Number of children: N/A    Years of education: N/A     Social History Main Topics    Smoking status: Current Some Day Smoker     Types: Cigars    Smokeless tobacco: Current User    Alcohol use 1.2 oz/week     2 Glasses of wine per week    Drug use: No    Sexual activity: Not on file     Other Topics Concern    Not on file     Social History Narrative      No Known Allergies  No past medical history on file. No past surgical history on file. No family history on file.         Objective:     Vitals:    07/03/17 1329   BP: 147/81   Pulse: 67   Resp: 14   Temp: 98.4 °F (36.9 °C)   Weight: 165 lb (74.8 kg)   Height: 5' 9\" (1.753 m)       Physical Examination: General appearance - alert, well appearing, and in no distress  Eyes - pupils equal and reactive, extraocular eye movements intact  Ears - bilateral TM's and external ear canals normal  Nose - normal and patent, no erythema, discharge or polyps  Mouth - mucous membranes moist, pharynx normal without lesions  Neck - supple, no significant adenopathy  Chest - clear to auscultation, no wheezes, rales or rhonchi, symmetric air entry  Heart - normal rate, regular rhythm, normal S1, S2, no murmurs, rubs, clicks or gallops  Abdomen - soft, nontender, nondistended, no masses or organomegaly  Rectal - negative without mass, lesions or tenderness, external hemorrhoids noted, stool guaiac negative  Back exam - full range of motion, no tenderness, palpable spasm or pain on motion  Neurological - alert, oriented, normal speech, no focal findings or movement disorder noted  Musculoskeletal - no joint tenderness, deformity or swelling  Extremities - peripheral pulses normal, no pedal edema, no clubbing or cyanosis  Skin - normal coloration and turgor, no rashes, no suspicious skin lesions noted    Assessment/ Plan:   Kayleen Butt was seen today for cholesterol problem and annual wellness visit. Diagnoses and all orders for this visit:    Hyperlipidemia, unspecified hyperlipidemia type  -     LIPID PANEL  -     METABOLIC PANEL, COMPREHENSIVE    Routine general medical examination at a health care facility    Screening for alcoholism    Heat exhaustion, initial encounter  -     CK  -     CBC W/O DIFF    SOB (shortness of breath) on exertion  -     REFERRAL TO ENT-OTOLARYNGOLOGY    Ear congestion, bilateral  -     REFERRAL TO ENT-OTOLARYNGOLOGY     Follow-up Disposition: Not on File    I have discussed the diagnosis with the patient and the intended plan as seen in the above orders. The patient has received an after-visit summary and questions were answered concerning future plans. Medication Side Effects and Warnings were discussed with patient: yes  Patient Labs were reviewed and or requested: yes  Patient Past Records were reviewed and or requested  yes  I have discussed the diagnosis with the patient and the intended plan as seen in the above orders.   The patient has received an after-visit summary and questions were answered concerning future plans. Pt agrees to call or return to clinic and/or go to closest ER with any worsening of symptoms. This may include, but not limited to increased fever (>100.4) with NSAIDS or Tylenol, increased edema, confusion, rash, worsening of presenting symptoms. Patient was informed/counseled to:    Di    1) Remember to stay active and/or exercise regularly (I suggest 30-45 minutes daily)   2) For reliable dietary information, go to www. EATRIGHT.org. You may wish to consider seeing the nutritionist at [facility] at #[phone] or [phone], also consider the [address] 3) I routinely suggest a complete physical exam once each year (your birth month)    This is a Subsequent Medicare Annual Wellness Visit providing Personalized Prevention Plan Services (PPPS) (Performed 12 months after initial AWV and PPPS )    I have reviewed the patient's medical history in detail and updated the computerized patient record. History   No past medical history on file. No past surgical history on file. Current Outpatient Prescriptions   Medication Sig Dispense Refill    omeprazole (PRILOSEC) 40 mg capsule Take 1 Cap by mouth daily. 30 Cap 0    rosuvastatin (CRESTOR) 5 mg tablet Take 1 Tab by mouth nightly. 30 Tab 3    fluticasone (FLONASE) 50 mcg/actuation nasal spray 2 Sprays by Both Nostrils route daily.  efinaconazole (JUBLIA) erlinda topical solution Apply  to affected area daily.  Cholecalciferol, Vitamin D3, (VITAMIN D3) 2,000 unit cap capsule Take  by mouth two (2) times a day.  omega-3 fatty acids-vitamin e (FISH OIL) 1,000 mg cap Take 1 Cap by mouth.  tadalafil (CIALIS) 20 mg tablet Take 20 mg by mouth as needed.  ascorbic acid, vitamin C, (VITAMIN C) 500 mg tablet Take 1,000 mg by mouth.  glucosamine-chondroitin (ELATION) 1,500-1,200 mg/30 mL liqd Take  by mouth.       metaxalone (SKELAXIN) 800 mg tablet Take  by mouth.  meloxicam (MOBIC) 15 mg tablet Take 1 Tab by mouth daily. 30 Tab 1     No Known Allergies  No family history on file. Social History   Substance Use Topics    Smoking status: Current Some Day Smoker     Types: Cigars    Smokeless tobacco: Current User    Alcohol use 1.2 oz/week     2 Glasses of wine per week     Patient Active Problem List   Diagnosis Code    Hyperlipidemia E78.5       Depression Risk Factor Screening:     PHQ over the last two weeks 4/11/2017   Little interest or pleasure in doing things Not at all   Feeling down, depressed or hopeless Not at all   Total Score PHQ 2 0     Alcohol Risk Factor Screening: On any occasion during the past 3 months, have you had more than 4 drinks containing alcohol? No    Do you average more than 14 drinks per week? No        Functional Ability and Level of Safety:     Hearing Loss   normal-to-mild    Activities of Daily Living   Self-care. Requires assistance with: no ADLs    Fall Risk   Fall Risk Assessment, last 12 mths 4/11/2017   Able to walk? Yes   Fall in past 12 months? No     Abuse Screen   Patient is not abused    Review of Systems   A comprehensive review of systems was negative except for that written in the HPI. Physical Examination     Evaluation of Cognitive Function:  Mood/affect:  happy  Appearance: age appropriate  Family member/caregiver input:     Visit Vitals    /81    Pulse 67    Temp 98.4 °F (36.9 °C)    Resp 14    Ht 5' 9\" (1.753 m)    Wt 165 lb (74.8 kg)    BMI 24.37 kg/m2     General:  Alert, cooperative, no distress, appears stated age. Head:  Normocephalic, without obvious abnormality, atraumatic. Eyes:  Conjunctivae/corneas clear. PERRL, EOMs intact. Fundi benign   Ears:  Normal TMs and external ear canals both ears. Nose: Nares normal. Septum midline. Mucosa normal. No drainage or sinus tenderness.    Throat: Lips, mucosa, and tongue normal. Teeth and gums normal.   Neck: Supple, symmetrical, trachea midline, no adenopathy, thyroid: no enlargement/tenderness/nodules, no carotid bruit and no JVD. Back:   Symmetric, no curvature. ROM normal. No CVA tenderness. Lungs:   Clear to auscultation bilaterally. Chest wall:  No tenderness or deformity. Heart:  Regular rate and rhythm, S1, S2 normal, no murmur, click, rub or gallop. Abdomen:   Soft, non-tender. Bowel sounds normal. No masses,  No organomegaly. Genitalia:  Normal male without lesion, discharge or tenderness. Rectal:  Normal tone, normal prostate, no masses or tenderness  Guaiac negative stool. Extremities: Extremities normal, atraumatic, no cyanosis or edema. Pulses: 2+ and symmetric all extremities. Skin: Skin color, texture, turgor normal. No rashes or lesions   Lymph nodes: Cervical, supraclavicular, and axillary nodes normal.   Neurologic: CNII-XII intact. Normal strength, sensation and reflexes throughout. Patient Care Team:  Romero Carver MD as PCP - [facility])    Advice/Referrals/Counseling   Education and counseling provided:  Are appropriate based on today's review and evaluation  End-of-Life planning (with patient's consent)      Assessment/Plan       ICD-10-CM ICD-9-CM    1. Hyperlipidemia, unspecified hyperlipidemia type E78.5 272.4 LIPID PANEL      METABOLIC PANEL, COMPREHENSIVE   2. Routine general medical examination at a health care facility Z00.00 V70.0    3. Screening for alcoholism Z13.89 V79.1    4. Heat exhaustion, initial encounter T67. 5XXA 992.5 CK      CBC W/O DIFF   5. SOB (shortness of breath) on exertion R06.02 786.05 REFERRAL TO ENT-OTOLARYNGOLOGY   6. Ear congestion, bilateral H93.8X3 388.8 REFERRAL TO ENT-OTOLARYNGOLOGY   .

## 2017-07-03 NOTE — MR AVS SNAPSHOT
Visit Information Date & Time Provider Department Dept. Phone Encounter #  
 7/3/2017  1:45 PM Richelle Rubin MD [employer] [facility] [address] [id number] Upcoming Health Maintenance Date Due DTaP/Tdap/Td series (1 - Tdap) 5/3/1971 FOBT Q 1 YEAR AGE 50-75 5/3/2000 ZOSTER VACCINE AGE 60> 5/3/2010 GLAUCOMA SCREENING Q2Y 5/3/2015 Pneumococcal 65+ Low/Medium Risk (1 of 2 - PCV13) 5/3/2015 MEDICARE YEARLY EXAM 5/3/2015 INFLUENZA AGE 9 TO ADULT 8/1/2017 Allergies as of 7/3/2017  Review Complete On: 7/3/2017 By: Richelle Rubin MD  
 No Known Allergies Current Immunizations  Never Reviewed Name Date Influenza High Dose Vaccine PF 1/5/2017 Not reviewed this visit You Were Diagnosed With   
  
 Codes Comments Hyperlipidemia, unspecified hyperlipidemia type    -  Primary ICD-10-CM: E78.5 ICD-9-CM: 272.4 Routine general medical examination at a health care facility     ICD-10-CM: Z00.00 ICD-9-CM: V70.0 Screening for alcoholism     ICD-10-CM: Z13.89 ICD-9-CM: V79.1 Heat exhaustion, initial encounter     ICD-10-CM: T67. Leonela Leavens ICD-9-CM: 992.5 SOB (shortness of breath) on exertion     ICD-10-CM: R06.02 
ICD-9-CM: 786.05 Ear congestion, bilateral     ICD-10-CM: D62.1W3 ICD-9-CM: 388. 8 Special screening for malignant neoplasm of prostate     ICD-10-CM: Z12.5 ICD-9-CM: V76.44   
 ACP (advance care planning)     ICD-10-CM: Z71.89 ICD-9-CM: V65.49 Vitals BP Pulse Temp Resp Height(growth percentile) Weight(growth percentile) 147/81 67 98.4 °F (36.9 °C) 14 5' 9\" (1.753 m) 165 lb (74.8 kg) BMI Smoking Status 24.37 kg/m2 Current Some Day Smoker Vitals History BMI and BSA Data Body Mass Index Body Surface Area  
 24.37 kg/m 2 1.91 m 2 Preferred Pharmacy Pharmacy Name Phone  [facility] [address] [place] [address] [phone] Your Updated Medication List  
  
   
This list is accurate as of: 7/3/17  2:00 PM.  Always use your most recent med list.  
  
  
  
  
 Cholecalciferol (Vitamin D3) 2,000 unit Cap capsule Commonly known as:  VITAMIN D3 Take  by mouth two (2) times a day. FISH OIL 1,000 mg Cap Generic drug:  omega-3 fatty acids-vitamin e Take 1 Cap by mouth. FLONASE 50 mcg/actuation nasal spray Generic drug:  fluticasone 2 Sprays by Both Nostrils route daily. glucosamine-chondroitin 1,500-1,200 mg/30 mL Liqd Commonly known as:  ELATION Take  by mouth. JUBLIA Nettie topical solution Generic drug:  efinaconazole Apply  to affected area daily. rosuvastatin 5 mg tablet Commonly known as:  CRESTOR Take 1 Tab by mouth nightly. VITAMIN C 500 mg tablet Generic drug:  ascorbic acid (vitamin C) Take 1,000 mg by mouth. We Performed the Following CBC W/O DIFF [76395 CPT(R)] CK [id number] CPT(R)] LIPID PANEL [14304 CPT(R)] METABOLIC PANEL, COMPREHENSIVE [20519 CPT(R)] CT PROSTATE CA SCREENING; CHRISTY [ [facility]] PSA SCREENING (SCREENING) [ [facility]] REFERRAL TO ENT-OTOLARYNGOLOGY [MQL75 Custom] Comments:  
 Please evaluate patient for sob and ear congestion. Referral Information Referral ID Referred By Referred To  
  
 [id number] Elliot Muller M.D.   
   [facility] PROVIDERS [facility] [address], [address] Visits Status Start Date End Date 1 New Request 7/3/17 7/3/18 If your referral has a status of pending review or denied, additional information will be sent to support the outcome of this decision. Introducing [facility]! Dear Carolina Fatima: 
Thank you for requesting a [employer] account. Our records indicate that you already have an active [employer] account. You can access your account anytime at [email or web address]. [employer] Did you know that you can access your hospital and ER discharge instructions at any time in [email or web address]? You can also review all of your test results from your hospital stay or ER visit. Additional Information If you have questions, please visit the Frequently Asked Questions section of the [email or web address] website at [email or web address] Remember, [email or web address] is NOT to be used for urgent needs. For medical emergencies, dial 911. Now available from your iPhone and Android! Please provide this summary of care documentation to your next provider. Your primary care clinician is listed as Rachid Delgadillo. If you have any questions after today's visit, please call [phone].

## 2017-07-06 LAB
ALBUMIN SERPL-MCNC: 4.3 G/DL (ref 3.6–4.8)
ALBUMIN/GLOB SERPL: 1.9 {RATIO} (ref 1.2–2.2)
ALP SERPL-CCNC: 60 IU/L (ref 39–117)
ALT SERPL-CCNC: 23 IU/L (ref 0–44)
AST SERPL-CCNC: 20 IU/L (ref 0–40)
BILIRUB SERPL-MCNC: 0.6 MG/DL (ref 0–1.2)
BUN SERPL-MCNC: 12 MG/DL (ref 8–27)
BUN/CREAT SERPL: 13 (ref 10–24)
CALCIUM SERPL-MCNC: 9 MG/DL (ref 8.6–10.2)
CHLORIDE SERPL-SCNC: 102 MMOL/L (ref 96–106)
CHOLEST SERPL-MCNC: 280 MG/DL (ref 100–199)
CK SERPL-CCNC: 208 U/L (ref 24–204)
CO2 SERPL-SCNC: 20 MMOL/L (ref 18–29)
CREAT SERPL-MCNC: 0.92 MG/DL (ref 0.76–1.27)
ERYTHROCYTE [DISTWIDTH] IN BLOOD BY AUTOMATED COUNT: 13.6 % (ref 12.3–15.4)
GLOBULIN SER CALC-MCNC: 2.3 G/DL (ref 1.5–4.5)
GLUCOSE SERPL-MCNC: 87 MG/DL (ref 65–99)
HCT VFR BLD AUTO: 43.3 % (ref 37.5–51)
HDLC SERPL-MCNC: 80 MG/DL
HGB BLD-MCNC: 14.6 G/DL (ref 12.6–17.7)
LDLC SERPL CALC-MCNC: 185 MG/DL (ref 0–99)
MCH RBC QN AUTO: 29.9 PG (ref 26.6–33)
MCHC RBC AUTO-ENTMCNC: 33.7 G/DL (ref 31.5–35.7)
MCV RBC AUTO: 89 FL (ref 79–97)
PLATELET # BLD AUTO: 142 X10E3/UL (ref 150–379)
POTASSIUM SERPL-SCNC: 4 MMOL/L (ref 3.5–5.2)
PROT SERPL-MCNC: 6.6 G/DL (ref 6–8.5)
PSA SERPL-MCNC: 1.1 NG/ML (ref 0–4)
RBC # BLD AUTO: 4.89 X10E6/UL (ref 4.14–5.8)
SODIUM SERPL-SCNC: 141 MMOL/L (ref 134–144)
TRIGL SERPL-MCNC: 76 MG/DL (ref 0–149)
VLDLC SERPL CALC-MCNC: 15 MG/DL (ref 5–40)
WBC # BLD AUTO: 3.5 X10E3/UL (ref 3.4–10.8)

## 2017-07-06 RX ORDER — ROSUVASTATIN CALCIUM 5 MG/1
5 TABLET, COATED ORAL
Qty: 30 TAB | Refills: 3 | Status: SHIPPED | OUTPATIENT
Start: 2017-07-06 | End: 2018-07-25 | Stop reason: ALTCHOICE

## 2017-10-18 DIAGNOSIS — M54.50 ACUTE MIDLINE LOW BACK PAIN WITHOUT SCIATICA: Primary | ICD-10-CM

## 2017-10-19 ENCOUNTER — CLINICAL SUPPORT (OUTPATIENT)
Dept: INTERNAL MEDICINE CLINIC | Age: 67
End: 2017-10-19

## 2017-10-19 ENCOUNTER — HOSPITAL ENCOUNTER (OUTPATIENT)
Dept: PHYSICAL THERAPY | Age: 67
Discharge: HOME | End: 2017-10-19
Payer: MEDICARE

## 2017-10-19 DIAGNOSIS — Z23 ENCOUNTER FOR IMMUNIZATION: Primary | ICD-10-CM

## 2017-10-19 PROCEDURE — 97110 THERAPEUTIC EXERCISES: CPT | Performed by: PHYSICAL THERAPIST

## 2017-10-19 PROCEDURE — G8979 MOBILITY GOAL STATUS: HCPCS | Performed by: PHYSICAL THERAPIST

## 2017-10-19 PROCEDURE — 97161 PT EVAL LOW COMPLEX 20 MIN: CPT | Performed by: PHYSICAL THERAPIST

## 2017-10-19 PROCEDURE — 97014 ELECTRIC STIMULATION THERAPY: CPT | Performed by: PHYSICAL THERAPIST

## 2017-10-19 PROCEDURE — G8978 MOBILITY CURRENT STATUS: HCPCS | Performed by: PHYSICAL THERAPIST

## 2017-10-19 NOTE — PROGRESS NOTES
PT INITIAL EVALUATION NOTE - [facility] -15    Patient Name: Rosina Polanco  Date:10/19/2017  : 1950  [x]  Patient  Verified  Payor: [place] [employer] / Plan: [place] MED[place] PART A & B / Product Type: Medicare /    In time: 2:00p  Out time:3:00p  Total Treatment Time (min): 60  Total Timed Codes (min): 60  1:1 Treatment Time ( [address] only): 60  Visit #: 1    Treatment Area: Low back pain [M54.5]    SUBJECTIVE  Pain Level (0-10 scale): 7/10  Any medication changes, allergies to medications, adverse drug reactions, diagnosis change, or new procedure performed?: [] No    [x] Yes (see summary sheet for update)  Subjective:    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 Currently he has increased numbness and pain down the left leg. And starting to feel pain in his neck. MRI a long time ago. Herniated disc, Stenosis and OA. The pt reports that 3-4 inches below the shoulder blade he has tightness into his muscles. Both side denies any numbness and tingling down arms. The pt also tries heat. The pt reports that sitting for more than 10-15 minutes is really bothersome  Walking without limitations (stiff to get started). PLOF: photography  Mechanism of Injury: lifting  Previous Treatment/Compliance: PT, injections and chiropractor  PMHx/Surgical Hx: OA, Chronic back  Work Hx: retired air , VET  Living Situation: independent  Pt Goals:  Eliminate pain levels,   Barriers: chronic  Motivation: high  Substance use: none  FABQ Score: 88  Cognition: A & O x 4        OBJECTIVE/EXAMINATION  Description of symptoms: Pain in back (low lumbar) left greater than right side. Aggravating Factors: sitting, lifting   Alleviating Factors: walking, heat. Radiation: x-ray, MRI in the past.     Patient reports functional limitations with: lifting, sitting > 10-15 minutes. bending    OBJECTIVE    Posture:   Flattened lumbar spine.    Gait and Functional Mobility:  Good mobility of the lumbar spine into flexion with relief or pressure released. Palpation: pain along lumbar paraspinals and         Lumbar AROM:          R  L    Flexion    100%  -    Extension   50%  -    Side Bending   100%  100%    Rotation   75%  75%        LOWER QUARTER   MUSCLE STRENGTH  KEY       R  L  0 - No Contraction  L1, L2 Psoas  5  4+  1 - Trace   L3 Quads  5  5  2 - Poor   L4 Tib Ant  5  5  3 - Fair    L5 EHL  5  5  4 - Good   S1 Peroneals  5  5  5 - Normal   S2 Hams  5  5    Flexibility: positive HS and piriformis tightness  Mobility Assessment: decreased lumbar mobilty        Neurological: Reflexes / Sensations:  Numbness and parasthesias into left L5-S1 distribution. Special Tests:    Trendelenberg: neg    FABERS: neg   Forward Bend: neg    Slump: negative   H.S. SLR:positive    Piriformis Ext: positive   Long Sit: negative    Lumbar Distraction: positive         Modality rationale: decrease pain and increase tissue extensibility to improve the patients ability to tolerate daily activities.     Min Type Additional Details   15 [x] Estim: []Att   []Unatt        []TENS instruct                  [x]IFC  []Premod   []NMES                     []Other:  []w/US   []w/ice   [x]w/heat  Position: supine  Location: lumbar     []  Traction: [] Cervical       []Lumbar                       [] Prone          []Supine                       []Intermittent   []Continuous Lbs:  [] before manual  [] after manual  []w/heat    []  Ultrasound: []Continuous   [] Pulsed at:                           []1MHz   []3MHz Location:  W/cm2:    [] Paraffin         Location:   []w/heat    []  Ice     []  Heat  []  Ice massage Position:  Location:    []  Laser  []  Other: Position:  Location:      []  Vasopneumatic Device Pressure:       [] lo [] med [] hi   Temperature:      [x] Skin assessment post-treatment:  [x]intact []redness- no adverse reaction    []redness  adverse reaction:     20 min Therapeutic Exercise:  [x] See flow sheet : Rationale: increase ROM, increase strength, improve coordination and increase proprioception to improve the patients ability to tolerate daily activities without restrictions. - min Manual Therapy: plan to perform lumbar hooklying traction, STM to bilateral paraspinals and  Left piriformis. Rationale: decrease pain, increase ROM, increase tissue extensibility and decrease trigger points to improve the patients ability to tolerate daily activities without restrictions.              With   [] TE   [] TA   [] neuro   [] other: Patient Education: [x] Review HEP    [] Progressed/Changed HEP based on:   [] positioning   [] body mechanics   [] transfers   [] heat/ice application    [] other:      Other Objective/Functional Measures: 58    Pain Level (0-10 scale) post treatment: 5/10    ASSESSMENT/Changes in Function:     [x]  See Plan of Cade SHARDA Weems [place] 10/19/2017  2:05 PM

## 2017-10-19 NOTE — PROGRESS NOTES
[facility]  [address]  Phone: [phone]  Fax: [phone]      Plan of Care/Statement of Necessity for Physical Therapy Services  2-15    Patient name: Manjit Trinh  : 1950  Provider#: [id number]  Referral source: Gabriela Knowles MD      Medical/Treatment Diagnosis: Low back pain [M54.5]     Prior Hospitalization: see medical history     Comorbidities:OA  Prior Level of Function: active, walking, gym activities  Medications: Verified on Patient Summary List  Start of Care:10/19/17      Onset Date: years worse in the past few months   The Plan of Care and following information is based on the information from the initial evaluation. Assessment/ key information: The pt is a 79 y.o. Male referred for the evaluation and treatment of back pain with sciatica. He presents with s/s consistent with referring dx with flexion bias lumbar radiculopathy. Pt has decreased ROM, flexibility of HS, and piriformis, tenderness and turgor along lumbar paraspinals and left piriformis. The pt would benefit from skilled physical therapy in order to address these impairments and to return him to maximal level of function pain free.      Evaluation Complexity History MEDIUM  Complexity : 1-2 comorbidities / personal factors will impact the outcome/ POC ; Examination MEDIUM Complexity : 3 Standardized tests and measures addressing body structure, function, activity limitation and / or participation in recreation  ;Presentation LOW Complexity : Stable, uncomplicated  ;Clinical Decision Making MEDIUM Complexity : FOTO score of 26-74  Overall Complexity Rating: LOW     Problem List: pain affecting function, decrease ROM, decrease strength, edema affecting function, impaired gait/ balance, decrease ADL/ functional abilitiies, decrease activity tolerance, decrease flexibility/ joint mobility and decrease transfer abilities   Treatment Plan may include any combination of the following: Therapeutic exercise, Therapeutic activities, Neuromuscular re-education, Physical agent/modality, Gait/balance training, Manual therapy, Patient education and Functional mobility training  Patient / Family readiness to learn indicated by: asking questions, trying to perform skills and interest  Persons(s) to be included in education: patient (P)  Barriers to Learning/Limitations: None  Patient Goal (s): decrease pain  Patient Self Reported Health Status: good  Rehabilitation Potential: good    Short Term Goals: To be accomplished in 2 weeks:  1) Pt will be Independent with HEP  2) Pt will be able to Sit greater than 15 minutes without pain    Long Term Goals: To be accomplished in 6 weeks:  1) Pt will be able to Sit greater than 30 minutes without pain  2) Pt will be able to Stand greater than 60 minutes without increase of pain  3)  Pt will be able to retrieve item form ground without pain  5) Pt will be able to carry >/= 30 lbs without pain    Frequency / Duration: Patient to be seen 2 times per week for 4-6 weeks. Patient/ Caregiver education and instruction: self care, activity modification and exercises    [x]  Plan of care has been reviewed with [facility]    G-Codes (GP)  Mobility   Current  CK= 40-59%   Goal  CJ= 20-39%      The severity rating is based on clinical judgment and the FOTO Score score. Certification Period: 10/19/17 -1/19/17     Blaire Blackmon 10/19/2017 3:12 PM    ________________________________________________________________________    I certify that the above Therapy Services are being furnished while the patient is under my care. I agree with the treatment plan and certify that this therapy is necessary.     [phone] [id number] Signature:____________________  Date:____________Time: _________

## 2017-10-25 ENCOUNTER — HOSPITAL ENCOUNTER (OUTPATIENT)
Dept: PHYSICAL THERAPY | Age: 67
Discharge: HOME | End: 2017-10-25
Payer: MEDICARE

## 2017-10-25 PROCEDURE — 97140 MANUAL THERAPY 1/> REGIONS: CPT

## 2017-10-25 PROCEDURE — 97110 THERAPEUTIC EXERCISES: CPT

## 2017-10-25 PROCEDURE — 97014 ELECTRIC STIMULATION THERAPY: CPT

## 2017-10-25 NOTE — PROGRESS NOTES
PT DAILY TREATMENT NOTE - [facility] 2-15    Patient Name: Darrian Moran  Date:10/25/2017  : 1950  [x]  Patient  Verified  Payor: [place] MEDICARE / Plan: [place] MEDICARE PART A & B / Product Type: Medicare /    In time: 2:00P  Out time:3:15P  Total Treatment Time (min): 75  Total Timed Codes (min): 60  1:1 Treatment Time ([facility] only): 40   Visit #: 2     Treatment Area: Low back pain [M54.5]    SUBJECTIVE  Pain Level (0-10 scale): 7-8/10  Any medication changes, allergies to medications, adverse drug reactions, diagnosis change, or new procedure performed?: [x] No    [] Yes (see summary sheet for update)  Subjective functional status/changes:   [] No changes reported  Pt reported that over the weekend he woke up early  morning with pain his back.      OBJECTIVE    Modality rationale: decrease inflammation, decrease pain and increase tissue extensibility to improve the patients ability to decrease LBP   Min Type Additional Details   15 post [x] Estim: []Att   [x]Unatt        []TENS instruct                  [x]IFC  []Premod   []NMES                     []Other:  []w/US   []w/ice   [x]w/heat  Position: supine with bolster  Location: back    []  Traction: [] Cervical       []Lumbar                       [] Prone          []Supine                       []Intermittent   []Continuous Lbs:  [] before manual  [] after manual  []w/heat    []  Ultrasound: []Continuous   [] Pulsed at:                           []1MHz   []3MHz Location:  W/cm2:    [] Paraffin         Location:   []w/heat    []  Ice     []  Heat  []  Ice massage Position:  Location:    []  Laser  []  Other: Position:  Location:      []  Vasopneumatic Device Pressure:       [] lo [] med [] hi   Temperature:      [x] Skin assessment post-treatment:  [x]intact []redness- no adverse reaction    []redness  adverse reaction:     30 min Therapeutic Exercise:  [x] See flow sheet :   Rationale: increase ROM, increase strength and improve coordination to improve the patients ability to increase function and mobility    30 min Manual Therapy:  lumbar hooklying traction, STM to bilateral paraspinals and  Left piriformis release. Rationale: decrease pain, increase ROM, increase tissue extensibility and decrease trigger points to improve the patients ability to increase mobility          With   [] TE   [] TA   [] neuro   [] other: Patient Education: [x] Review HEP    [] Progressed/Changed HEP based on:   [] positioning   [] body mechanics   [] transfers   [] heat/ice application    [] other:      Other Objective/Functional Measures: --     Pain Level (0-10 scale) post treatment: 2/10    ASSESSMENT/Changes in Function:   Pt reported immediate relief with manual therapy today. Pt advised about pushing too aggressively into stretches and exercises. Patient will continue to benefit from skilled PT services to modify and progress therapeutic interventions, address functional mobility deficits, address ROM deficits, address strength deficits, analyze and address soft tissue restrictions, analyze and cue movement patterns and analyze and modify body mechanics/ergonomics to attain remaining goals. [x]  See Plan of Care  []  See progress note/recertification  []  See Discharge Summary         Progress towards goals / Updated goals:  Short Term Goals: To be accomplished in 2 weeks:  1) Pt will be Independent with HEP  2) Pt will be able to Sit greater than 15 minutes without pain     Long Term Goals:  To be accomplished in 6 weeks:  1) Pt will be able to Sit greater than 30 minutes without pain  2) Pt will be able to Stand greater than 60 minutes without increase of pain  3)  Pt will be able to retrieve item form ground without pain  5) Pt will be able to carry >/= 30 lbs without pain                     PLAN  [x]  Upgrade activities as tolerated     [x]  Continue plan of care  []  Update interventions per flow sheet       []  Discharge due to:_  []  Other:_      Keven Dawn, PTA 10/25/2017  1:54 PM

## 2017-10-27 ENCOUNTER — HOSPITAL ENCOUNTER (OUTPATIENT)
Dept: PHYSICAL THERAPY | Age: 67
Discharge: HOME | End: 2017-10-27
Payer: MEDICARE

## 2017-10-27 PROCEDURE — 97140 MANUAL THERAPY 1/> REGIONS: CPT

## 2017-10-27 PROCEDURE — 97110 THERAPEUTIC EXERCISES: CPT

## 2017-10-27 NOTE — PROGRESS NOTES
PT DAILY TREATMENT NOTE - [facility] 2-15    Patient Name: Parag Quezada  Date:10/27/2017  : 1950  [x]  Patient  Verified  Payor: [place] MEDICARE / Plan: [place] MEDICARE PART A & B / Product Type: Medicare /    In time: 11:55A  Out time: 12:55P  Total Treatment Time (min): 60  Total Timed Codes (min): 60  1:1 Treatment Time ([facility] only): 60   Visit #: 3     Treatment Area: Low back pain [M54.5]    SUBJECTIVE  Pain Level (0-10 scale): 4/10  Any medication changes, allergies to medications, adverse drug reactions, diagnosis change, or new procedure performed?: [x] No    [] Yes (see summary sheet for update)  Pt reported feeling much better following previous session. OBJECTIVE    40 min Therapeutic Exercise:  [x] See flow sheet :   Rationale: increase ROM, increase strength and improve coordination to improve the patients ability to increase function and mobility    20 min Manual Therapy:  lumbar hooklying traction, STM to bilateral paraspinals and  Left piriformis release. Rationale: decrease pain, increase ROM, increase tissue extensibility and decrease trigger points to improve the patients ability to increase mobility          With   [] TE   [] TA   [] neuro   [] other: Patient Education: [x] Review HEP    [] Progressed/Changed HEP based on:   [] positioning   [] body mechanics   [] transfers   [] heat/ice application    [] other:      Other Objective/Functional Measures: --     Pain Level (0-10 scale) post treatment: 1/10    ASSESSMENT/Changes in Function:   Pt reported no increase in pain or discomfort with stability exercises. Pt demonstrated good carry over with engaging deep abdominal muscles. Pt declined heat and e-stim today.    Patient will continue to benefit from skilled PT services to modify and progress therapeutic interventions, address functional mobility deficits, address ROM deficits, address strength deficits, analyze and address soft tissue restrictions, analyze and cue movement patterns and analyze and modify body mechanics/ergonomics to attain remaining goals. [x]  See Plan of Care  []  See progress note/recertification  []  See Discharge Summary         Progress towards goals / Updated goals:  Short Term Goals: To be accomplished in 2 weeks:  1) Pt will be Independent with HEP  2) Pt will be able to Sit greater than 15 minutes without pain     Long Term Goals:  To be accomplished in 6 weeks:  1) Pt will be able to Sit greater than 30 minutes without pain  2) Pt will be able to Stand greater than 60 minutes without increase of pain  3)  Pt will be able to retrieve item form ground without pain  5) Pt will be able to carry >/= 30 lbs without pain                     PLAN  [x]  Upgrade activities as tolerated     [x]  Continue plan of care  []  Update interventions per flow sheet       []  Discharge due to:_  []  Other:_      Larry [place], TRESA 10/27/2017  1:54 PM

## 2017-11-06 ENCOUNTER — OFFICE VISIT (OUTPATIENT)
Dept: INTERNAL MEDICINE CLINIC | Age: 67
End: 2017-11-06

## 2017-11-06 ENCOUNTER — HOSPITAL ENCOUNTER (OUTPATIENT)
Dept: PHYSICAL THERAPY | Age: 67
End: 2017-11-06
Payer: MEDICARE

## 2017-11-06 VITALS
SYSTOLIC BLOOD PRESSURE: 133 MMHG | BODY MASS INDEX: 24.44 KG/M2 | RESPIRATION RATE: 15 BRPM | WEIGHT: 165 LBS | TEMPERATURE: 98.4 F | OXYGEN SATURATION: 99 % | HEART RATE: 63 BPM | DIASTOLIC BLOOD PRESSURE: 78 MMHG | HEIGHT: 69 IN

## 2017-11-06 DIAGNOSIS — M54.42 ACUTE MIDLINE LOW BACK PAIN WITH LEFT-SIDED SCIATICA: ICD-10-CM

## 2017-11-06 DIAGNOSIS — S13.4XXA WHIPLASH INJURY TO NECK, INITIAL ENCOUNTER: Primary | ICD-10-CM

## 2017-11-06 RX ORDER — TRAVOPROST 0.04 MG/ML
SOLUTION/ DROPS OPHTHALMIC
Refills: 6 | COMMUNITY
Start: 2017-10-26 | End: 2021-12-16
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
## 2017-11-08 ENCOUNTER — HOSPITAL ENCOUNTER (OUTPATIENT)
Dept: PHYSICAL THERAPY | Age: 67
Discharge: HOME | End: 2017-11-08
Payer: MEDICARE

## 2017-11-08 ENCOUNTER — OFFICE VISIT (OUTPATIENT)
Dept: INTERNAL MEDICINE CLINIC | Age: 67
End: 2017-11-08

## 2017-11-08 VITALS
HEART RATE: 54 BPM | HEIGHT: 69 IN | WEIGHT: 169.3 LBS | DIASTOLIC BLOOD PRESSURE: 82 MMHG | BODY MASS INDEX: 25.07 KG/M2 | SYSTOLIC BLOOD PRESSURE: 151 MMHG | OXYGEN SATURATION: 100 % | RESPIRATION RATE: 16 BRPM | TEMPERATURE: 98.6 F

## 2017-11-08 DIAGNOSIS — M25.562 ACUTE PAIN OF LEFT KNEE: ICD-10-CM

## 2017-11-08 DIAGNOSIS — M70.62 TROCHANTERIC BURSITIS, LEFT HIP: ICD-10-CM

## 2017-11-08 DIAGNOSIS — M25.571 ACUTE RIGHT ANKLE PAIN: Primary | ICD-10-CM

## 2017-11-08 PROCEDURE — 97110 THERAPEUTIC EXERCISES: CPT | Performed by: PHYSICAL THERAPIST

## 2017-11-08 PROCEDURE — 97014 ELECTRIC STIMULATION THERAPY: CPT | Performed by: PHYSICAL THERAPIST

## 2017-11-08 PROCEDURE — 97140 MANUAL THERAPY 1/> REGIONS: CPT | Performed by: PHYSICAL THERAPIST
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
## 2017-11-13 ENCOUNTER — HOSPITAL ENCOUNTER (OUTPATIENT)
Dept: PHYSICAL THERAPY | Age: 67
Discharge: HOME | End: 2017-11-13
Payer: MEDICARE

## 2017-11-13 DIAGNOSIS — M54.2 NECK PAIN: Primary | ICD-10-CM

## 2017-11-13 PROCEDURE — 97110 THERAPEUTIC EXERCISES: CPT | Performed by: PHYSICAL THERAPIST

## 2017-11-13 PROCEDURE — 97140 MANUAL THERAPY 1/> REGIONS: CPT | Performed by: PHYSICAL THERAPIST

## 2017-11-14 NOTE — PROGRESS NOTES
PT DAILY TREATMENT NOTE - [facility] 2-15    Patient Name: Ronda Iniguez  Date:2017  : 1950  [x]  Patient  Verified  Payor: [place] MEDICARE / Plan: VA MEDICARE PART A & B / Product Type: Medicare /    In time:12:00p  Out time: 1:00p  Total Treatment Time (min): 60  Total Timed Codes (min): 45  1:1 Treatment Time ([address] only): 40  Visit #: 5    Treatment Area: Low back pain [M54.5]    SUBJECTIVE  Pain Level (0-10 scale): 4/10  Any medication changes, allergies to medications, adverse drug reactions, diagnosis change, or new procedure performed?: [x] No    [] Yes (see summary sheet for update)  Subjective functional status/changes:   [] No changes reported  The pt is doing better since last session.      OBJECTIVE           Modality rationale: decrease inflammation, decrease pain and increase tissue extensibility to improve the patients ability to decrease LBP   Min Type Additional Details    [x] Estim: []Att   [x]Unatt        []TENS instruct                  [x]IFC  []Premod   []NMES                     []Other:  []w/US   [x]w/ice   []w/heat  Position: supine with bolster  Location: back      []  Traction: [] Cervical       []Lumbar                       [] Prone          []Supine                       []Intermittent   []Continuous Lbs:  [] before manual  [] after manual  []w/heat      []  Ultrasound: []Continuous   [] Pulsed at:                           []1MHz   []3MHz Location:  W/cm2:      [] Paraffin      Location:   []w/heat     10 [x]  Ice     []  Heat  []  Ice massage Position: supine  Location: back, hip, knee      []  Laser  []  Other: Position:  Location:      []  Vasopneumatic Device Pressure:       [] lo [] med [] hi   Temperature:        [x] Skin assessment post-treatment:  [x]intact []redness- no adverse reaction    []redness  adverse reaction:       30 min Therapeutic Exercise:  [x] See flow sheet :   Rationale: increase ROM, increase strength and improve coordination to improve the patients ability to increase function and mobility      10 min Manual Therapy:  lumbar hooklying traction   Rationale: decrease pain, increase ROM, increase tissue extensibility and decrease trigger points to improve the patients ability to increase mobility      With   [] TE   [] TA   [] neuro   [] other: Patient Education: [x] Review HEP    [] Progressed/Changed HEP based on:   [] positioning   [] body mechanics   [] transfers   [] heat/ice application    [] other:        Other Objective/Functional Measures: --                   Pain Level (0-10 scale) post treatment: 1/10      ASSESSMENT/Changes in Function:   Pt tolerated session better today and improved pain levels compared to last session.  Relief noted with manual traction.      Patient will continue to benefit from skilled PT services to modify and progress therapeutic interventions, address functional mobility deficits, address ROM deficits, address strength deficits, analyze and address soft tissue restrictions, analyze and cue movement patterns and analyze and modify body mechanics/ergonomics to attain remaining goals.      [x]  See Plan of Care  []  See progress note/recertification  []  See Discharge Summary      Progress towards goals / Updated goals:  Short Term Goals: To be accomplished in 2 weeks:  1) Pt will be Independent with HEP  2) Pt will be able to Sit greater than 15 minutes without pain      Long Term Goals: To be accomplished in 6 weeks:  1) Pt will be able to Sit greater than 30 minutes without pain  2) Pt will be able to Stand greater than 60 minutes without increase of pain  3)  Pt will be able to retrieve item form ground without pain  5) Pt will be able to carry >/= 30 lbs without pain                       PLAN  [x]  Upgrade activities as tolerated     [x]  Continue plan of care  []  Update interventions per flow sheet       []  Discharge due to:_  []  Other:_      Chepe Lyons 11/14/2017  7:54 AM

## 2017-11-15 ENCOUNTER — HOSPITAL ENCOUNTER (OUTPATIENT)
Dept: PHYSICAL THERAPY | Age: 67
Discharge: HOME | End: 2017-11-15
Payer: MEDICARE

## 2017-11-15 PROCEDURE — 97110 THERAPEUTIC EXERCISES: CPT | Performed by: PHYSICAL THERAPIST

## 2017-11-15 PROCEDURE — 97140 MANUAL THERAPY 1/> REGIONS: CPT | Performed by: PHYSICAL THERAPIST

## 2017-11-15 NOTE — PROGRESS NOTES
PT DAILY TREATMENT NOTE - [facility] 2-15    Patient Name: Ricco Padilla  Date:11/15/2017  : 1950  [x]  Patient  Verified  Payor: [place] MEDICARE / Plan: VA MEDICARE PART A & B / Product Type: Medicare /    In time:12:10p  Out time: 1:10p  Total Treatment Time (min): 60  Total Timed Codes (min): 50  1:1 Treatment Time ( [address] only): 50  Visit #: 6    Treatment Area: Low back pain [M54.5]    SUBJECTIVE  Pain Level (0-10 scale): 5/10  Any medication changes, allergies to medications, adverse drug reactions, diagnosis change, or new procedure performed?: [x] No    [] Yes (see summary sheet for update)  Subjective functional status/changes:   [] No changes reported  The pt reports he is doing about the same today     OBJECTIVE                Modality rationale: decrease inflammation, decrease pain and increase tissue extensibility to improve the patients ability to decrease LBP   Min Type Additional Details     [x] Estim: []Att   [x]Unatt        []TENS instruct                  [x]IFC  []Premod   []NMES                     []Other:  []w/US   [x]w/ice   []w/heat  Position: supine with bolster  Location: back      []  Traction: [] Cervical       []Lumbar                       [] Prone          []Supine                       []Intermittent   []Continuous Lbs:  [] before manual  [] after manual  []w/heat      []  Ultrasound: []Continuous   [] Pulsed at:                           []1MHz   []3MHz Location:  W/cm2:      [] Paraffin      Location:   []w/heat     10 [x]  Ice     []  Heat  []  Ice massage Position: supine  Location: back, hip, knee      []  Laser  []  Other: Position:  Location:      []  Vasopneumatic Device Pressure:       [] lo [] med [] hi   Temperature:        [x] Skin assessment post-treatment:  [x]intact []redness- no adverse reaction    []redness  adverse reaction:       35 min Therapeutic Exercise:  [x] See flow sheet :   Rationale: increase ROM, increase strength and improve coordination to improve the patients ability to increase function and mobility      15 min Manual Therapy:  lumbar hooklying traction   Rationale: decrease pain, increase ROM, increase tissue extensibility and decrease trigger points to improve the patients ability to increase mobility      With   [] TE   [] TA   [] neuro   [] other: Patient Education: [x] Review HEP    [] Progressed/Changed HEP based on:   [] positioning   [] body mechanics   [] transfers   [] heat/ice application    [] other:        Other Objective/Functional Measures: --                   Pain Level (0-10 scale) post treatment: 1/10      ASSESSMENT/Changes in Function:   Pt tolerated session better today and improved pain levels compared to last session.  Relief noted with manual traction.       Patient will continue to benefit from skilled PT services to modify and progress therapeutic interventions, address functional mobility deficits, address ROM deficits, address strength deficits, analyze and address soft tissue restrictions, analyze and cue movement patterns and analyze and modify body mechanics/ergonomics to attain remaining goals.      [x]  See Plan of Care  []  See progress note/recertification  []  See Discharge Summary      Progress towards goals / Updated goals:  Short Term Goals: To be accomplished in 2 weeks:  1) Pt will be Independent with HEP  2) Pt will be able to Sit greater than 15 minutes without pain      Long Term Goals: To be accomplished in 6 weeks:  1) Pt will be able to Sit greater than 30 minutes without pain  2) Pt will be able to Stand greater than 60 minutes without increase of pain  3)  Pt will be able to retrieve item form ground without pain  5) Pt will be able to carry >/= 30 lbs without pain                       PLAN  [x]  Upgrade activities as tolerated     [x]  Continue plan of care  []  Update interventions per flow sheet       []  Discharge due to:_  []  Other:_      Blaire Gallardo 11/15/2017  2:13 PM

## 2017-11-20 ENCOUNTER — APPOINTMENT (OUTPATIENT)
Dept: PHYSICAL THERAPY | Age: 67
End: 2017-11-20
Payer: MEDICARE

## 2017-11-20 ENCOUNTER — HOSPITAL ENCOUNTER (OUTPATIENT)
Dept: PHYSICAL THERAPY | Age: 67
Discharge: HOME | End: 2017-11-20
Payer: MEDICARE

## 2017-11-20 PROCEDURE — 97164 PT RE-EVAL EST PLAN CARE: CPT | Performed by: PHYSICAL THERAPIST

## 2017-11-20 PROCEDURE — G8981 BODY POS CURRENT STATUS: HCPCS | Performed by: PHYSICAL THERAPIST

## 2017-11-20 PROCEDURE — 97110 THERAPEUTIC EXERCISES: CPT | Performed by: PHYSICAL THERAPIST

## 2017-11-20 PROCEDURE — G8982 BODY POS GOAL STATUS: HCPCS | Performed by: PHYSICAL THERAPIST
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
## 2017-11-22 ENCOUNTER — APPOINTMENT (OUTPATIENT)
Dept: PHYSICAL THERAPY | Age: 67
End: 2017-11-22
Payer: MEDICARE

## 2017-11-27 ENCOUNTER — HOSPITAL ENCOUNTER (OUTPATIENT)
Dept: PHYSICAL THERAPY | Age: 67
Discharge: HOME | End: 2017-11-27
Payer: MEDICARE

## 2017-11-27 PROCEDURE — 97110 THERAPEUTIC EXERCISES: CPT | Performed by: PHYSICAL THERAPIST

## 2017-11-27 PROCEDURE — 97140 MANUAL THERAPY 1/> REGIONS: CPT | Performed by: PHYSICAL THERAPIST

## 2017-11-27 PROCEDURE — 97014 ELECTRIC STIMULATION THERAPY: CPT | Performed by: PHYSICAL THERAPIST

## 2017-11-29 ENCOUNTER — HOSPITAL ENCOUNTER (OUTPATIENT)
Dept: PHYSICAL THERAPY | Age: 67
Discharge: HOME | End: 2017-11-29
Payer: MEDICARE

## 2017-11-29 PROCEDURE — 97014 ELECTRIC STIMULATION THERAPY: CPT | Performed by: PHYSICAL THERAPIST

## 2017-11-29 PROCEDURE — 97140 MANUAL THERAPY 1/> REGIONS: CPT | Performed by: PHYSICAL THERAPIST

## 2017-11-29 NOTE — PROGRESS NOTES
PT DAILY TREATMENT NOTE - [facility] 2-15    Patient Name: Seth Ortega  Date:2017  : 1950  [x]  Patient  Verified  [place]or[place] MED[place] / Plan: [place] MEDICARE PART A & B / Product Type: Medicare /    In time:12:00p  Out time:1:20p  Total Treatment Time (min):80  Total Timed Codes (min): 80  1:1 Treatment Time ([address] only): 39  Visit #: 8     Treatment Area: Low back pain [M54.5]  Cervicalgia [M54.2]    SUBJECTIVE  Pain Level (0-10 scale): 0/10 back 3/10 neck  Any medication changes, allergies to medications, adverse drug reactions, diagnosis change, or new procedure performed?: [x] No    [] Yes (see summary sheet for update)  Subjective functional status/changes:   [] No changes reported  The pt is doing a little better    OBJECTIVE                    Modality rationale: decrease inflammation, decrease pain and increase tissue extensibility to improve the patients ability to decrease LBP   Min Type Additional Details     15 [x] Estim: []Att   [x]Unatt        []TENS instruct                  [x]IFC  []Premod   []NMES                     []Other:  []w/US   []w/ice   [x]w/heat  Position: supine with bolster  Location: back and neck      []  Traction: [] Cervical       []Lumbar                       [] Prone          []Supine                       []Intermittent   []Continuous Lbs:  [] before manual  [] after manual  []w/heat      []  Ultrasound: []Continuous   [] Pulsed at:                           []1MHz   []3MHz Location:  W/cm2:      [] Paraffin      Location:   []w/heat    - [x]  Ice     []  Heat  []  Ice massage Position: supine  Location: back, hip, knee      []  Laser  []  Other: Position:  Location:      []  Vasopneumatic Device Pressure:       [] lo [] med [] hi   Temperature:        [x] Skin assessment post-treatment:  [x]intact []redness- no adverse reaction    []redness  adverse reaction:       35 min Therapeutic Exercise:  [x] See flow sheet :   Rationale: increase ROM, increase strength and improve coordination to improve the patients ability to increase function and mobility      25 min Manual Therapy:  lumbar hooklying traction, Cervical STM bilateral paraspinals   Rationale: decrease pain, increase ROM, increase tissue extensibility and decrease trigger points to improve the patients ability to increase mobility      With   [] TE   [] TA   [] neuro   [] other: Patient Education: [x] Review HEP    [] Progressed/Changed HEP based on:   [] positioning   [] body mechanics   [] transfers   [] heat/ice application    [] other:        Other Objective/Functional Measures: --                   Pain Level (0-10 scale) post treatment: 0/10 back, 0/10 neck      ASSESSMENT/Changes in Function:   Pt is feeling improvement with exercises and addition of manual therapy.        Patient will continue to benefit from skilled PT services to modify and progress therapeutic interventions, address functional mobility deficits, address ROM deficits, address strength deficits, analyze and address soft tissue restrictions, analyze and cue movement patterns and analyze and modify body mechanics/ergonomics to attain remaining goals.      [x]  See Plan of Care  []  See progress note/recertification  []  See Discharge Summary         Progress towards goals / Updated goals:  Updated GOALs:   Long Term Goals: To be accomplished in 4-6 weeks:  1) Pt will be able to read without increase of neck pain  2) Pt will be able to look over shoulders while driving without increase of neck pain  3) Pt will demonstrate ability to lift >/= 20 lbs without increase of symptoms  4) Pt will be able to Stand greater than 60 minutes without increase of pain  5)  Pt will be able to retrieve item form ground without pain     PLAN   []  Upgrade activities as tolerated     [x]  Continue plan of care  []  Update interventions per flow sheet       []  Discharge due to:_  []  Other:_      aCbrera Lakhani 11/29/2017  2:04 PM

## 2017-12-05 ENCOUNTER — OFFICE VISIT (OUTPATIENT)
Dept: INTERNAL MEDICINE CLINIC | Age: 67
End: 2017-12-05

## 2017-12-05 VITALS
RESPIRATION RATE: 16 BRPM | WEIGHT: 167 LBS | OXYGEN SATURATION: 99 % | TEMPERATURE: 98.5 F | DIASTOLIC BLOOD PRESSURE: 77 MMHG | HEART RATE: 66 BPM | HEIGHT: 69 IN | BODY MASS INDEX: 24.73 KG/M2 | SYSTOLIC BLOOD PRESSURE: 136 MMHG

## 2017-12-05 DIAGNOSIS — M54.40 BACK PAIN OF LUMBAR REGION WITH SCIATICA: Primary | ICD-10-CM

## 2017-12-05 NOTE — PROGRESS NOTES
Chief Complaint   Patient presents with    Back Pain     he is a 79y.o. year old male who presents for follow up of injury. Follow Up Pain Assessment Encounter      Onset of Symptoms: Several Weeks  ________________________________________________________________________  Description: Pain is unchanged      Pain Scale:(1-10): 6  Duration:  continuous  What makes it better?: rest  What makes it worse?:sitting and walking, standing  Medications tried: None  Modalities tried: PT        Reviewed and agree with Nurse Note and duplicated in this note. Reviewed PmHx, RxHx, FmHx, SocHx, AllgHx and updated and dated in the chart. No family history on file. No past medical history on file.    Social History     Social History    Marital status:      Spouse name: N/A    Number of children: N/A    Years of education: N/A     Social History Main Topics    Smoking status: Current Some Day Smoker     Types: Cigars    Smokeless tobacco: Current User    Alcohol use 1.2 oz/week     2 Glasses of wine per week    Drug use: No    Sexual activity: Not Asked     Other Topics Concern    None     Social History Narrative        Review of Systems - negative except as listed above      Objective:     Vitals:    12/05/17 1330   BP: 136/77   Pulse: 66   Resp: 16   Temp: 98.5 °F (36.9 °C)   TempSrc: Oral   SpO2: 99%   Weight: 167 lb (75.8 kg)   Height: 5' 9\" (1.753 m)       Physical Examination: General appearance - alert, well appearing, and in no distress  Back exam - limited range of motion, pain with motion noted during exam, tenderness noted bilateral lower back, positive straight-leg raise   Neurological - alert, oriented, normal speech, no focal findings or movement disorder noted  Musculoskeletal - no joint tenderness, deformity or swelling  Extremities - peripheral pulses normal, no pedal edema, no clubbing or cyanosis  Skin - normal coloration and turgor, no rashes, no suspicious skin lesions noted    Assessment/ Plan:   Diagnoses and all orders for this visit:    1. Back pain of lumbar region with sciatica  -     MRI LUMB SPINE WO CONT; Future     MRI for likely disk herniation  Follow-up Disposition:  Return if symptoms worsen or fail to improve. Pathophysiology, recovery and rehabilitation process discussed and questions answered   Counseling for 30 Minutes of the total visit duration   Pictures and figures used as necessary   Provided reassurance   Monitor response to Physical Therapy   Recommend activity modification   Recommend  lower impact activities-walking, Eliptical, Nordic Track, cycling or swimming   Follow up in 4 week(s)      1) Remember to stay active and/or exercise regularly (I suggest 30-45 minutes daily)   2) For reliable dietary information, go to www. EATRIGHT.org. You may wish to consider seeing the nutritionist at [facility] [phone], also consider the [address] I have discussed the diagnosis with the patient and the intended plan as seen in the above orders. The patient has received an after-visit summary and questions were answered concerning future plans. Medication Side Effects and Warnings were discussed with patient: yes  Patient Labs were reviewed and or requested: yes  Patient Past Records were reviewed and or requested  yes  I have discussed the diagnosis with the patient and the intended plan as seen in the above orders. The patient has received an after-visit summary and questions were answered concerning future plans. Pt agrees to call or return to clinic and/or go to closest ER with any worsening of symptoms. This may include, but not limited to increased fever (>100.4) with NSAIDS or Tylenol, increased edema, confusion, rash, worsening of presenting symptoms.

## 2017-12-05 NOTE — MR AVS SNAPSHOT
Visit Information Date & Time Provider Department Dept. Phone Encounter #  
 12/5/2017  1:15 PM Jannet Carreon MD [facility] [phone] [id number] Follow-up Instructions Return if symptoms worsen or fail to improve. Follow-up and Disposition History Upcoming Health Maintenance Date Due FOBT Q 1 YEAR AGE 50-75 5/3/2000 Pneumococcal 65+ Low/Medium Risk (2 of 2 - PPSV23) 3/1/2018 MEDICARE YEARLY EXAM 7/4/2018 GLAUCOMA SCREENING Q2Y 9/14/2018 DTaP/Tdap/Td series (2 - Td) 3/1/2027 Allergies as of 12/5/2017  Review Complete On: 12/5/2017 By: Jannet Carreon MD  
 No Known Allergies Current Immunizations  Never Reviewed Name Date Influenza High Dose Vaccine PF 1/5/2017 Pneumococcal Conjugate (PCV-13) 3/1/2017 Tdap 3/1/2017 Zoster Vaccine, Live 5/10/2016 Not reviewed this visit You Were Diagnosed With   
  
 Codes Comments Back pain of lumbar region with sciatica    -  Primary ICD-10-CM: M54.40 ICD-9-CM: 724.2, 724.3 Vitals BP Pulse Temp Resp Height(growth percentile) Weight(growth percentile) 136/77 (BP 1 Location: Left arm, BP Patient Position: Sitting) 66 98.5 °F (36.9 °C) (Oral) 16 5' 9\" (1.753 m) 167 lb (75.8 kg) SpO2 BMI Smoking Status 99% 24.66 kg/m2 Current Some Day Smoker Vitals History BMI and BSA Data Body Mass Index Body Surface Area  
 24.66 kg/m 2 1.92 m 2 Preferred Pharmacy Pharmacy Name Phone [address], [address] [phone] Your Updated Medication List  
  
   
This list is accurate as of: 12/5/17  1:49 PM.  Always use your most recent med list.  
  
  
  
  
 Cholecalciferol (Vitamin D3) 2,000 unit Cap capsule Commonly known as:  VITAMIN D3 Take  by mouth two (2) times a day. FISH OIL 1,000 mg Cap Generic drug:  omega-3 fatty acids-vitamin e Take 1 Cap by mouth. FLONASE 50 mcg/actuation nasal spray Generic drug:  fluticasone 2 Sprays by Both Nostrils route daily. glucosamine-chondroitin 1,500-1,200 mg/30 mL Liqd Commonly known as:  ELATION Take  by mouth. JUBLIA Nettie topical solution Generic drug:  efinaconazole Apply  to affected area daily. rosuvastatin 5 mg tablet Commonly known as:  CRESTOR Take 1 Tab by mouth nightly. TRAVATAN Z 0.004 % ophthalmic solution Generic drug:  travoprost  
INSTILL 1 DROP INTO BOTH EYES IN THE EVENING  
  
 VITAMIN C 500 mg tablet Generic drug:  ascorbic acid (vitamin C) Take 1,000 mg by mouth. Follow-up Instructions Return if symptoms worsen or fail to improve. To-Do List   
 12/05/2017 Imaging:  MRI LUMB SPINE WO CONT   
  
 12/06/2017 12:00 PM  
  Appointment with Sheila Remy at [facility] [address] ([phone]) Please remember to arrive at the hospital at least 30 minutes prior to your scheduled appointment time. When you come in for your appointment, please be sure to bring the therapy prescription the doctor gave you, your insurance card, and a list of the medicines you are taking. Also, please remember to wear comfortable, loose- fitting clothes. We look forward to seeing you. 12/08/2017 8:00 AM  
  Appointment with Sheila Remy at [facility] [address] ([phone]) Please remember to arrive at the hospital at least 30 minutes prior to your scheduled appointment time. When you come in for your appointment, please be sure to bring the therapy prescription the doctor gave you, your insurance card, and a list of the medicines you are taking. Also, please remember to wear comfortable, loose- fitting clothes. We look forward to seeing you. 12/12/2017 1:00 PM  
  Appointment with Sheila Remy at [facility] [address] ([phone]) Please remember to arrive at the hospital at least 30 minutes prior to your scheduled appointment time. When you come in for your appointment, please be sure to bring the therapy prescription the doctor gave you, your insurance card, and a list of the medicines you are taking. Also, please remember to wear comfortable, loose- fitting clothes. We look forward to seeing you. 12/14/2017 1:00 PM  
  Appointment with Matthew Sibley at [facility] [address] ([phone]) Please remember to arrive at the hospital at least 30 minutes prior to your scheduled appointment time. When you come in for your appointment, please be sure to bring the therapy prescription the doctor gave you, your insurance card, and a list of the medicines you are taking. Also, please remember to wear comfortable, loose- fitting clothes. We look forward to seeing you. 12/19/2017 1:00 PM  
  Appointment with Matthew Sibley at [facility] [address] ([phone]) Please remember to arrive at the hospital at least 30 minutes prior to your scheduled appointment time. When you come in for your appointment, please be sure to bring the therapy prescription the doctor gave you, your insurance card, and a list of the medicines you are taking. Also, please remember to wear comfortable, loose- fitting clothes. We look forward to seeing you. 12/21/2017 1:00 PM  
  Appointment with Matthew Sibley at [facility] [address] ([phone]) Please remember to arrive at the hospital at least 30 minutes prior to your scheduled appointment time. When you come in for your appointment, please be sure to bring the therapy prescription the doctor gave you, your insurance card, and a list of the medicines you are taking. Also, please remember to wear comfortable, loose- fitting clothes. We look forward to seeing you. Introducing [facility]! Dear Naila Dillon: 
Thank you for requesting a [employer] account.   Our records indicate that you already have an active [employer] account. You can access your account anytime at [email or web address] Did you know that you can access your hospital and ER discharge instructions at any time in [employer]? You can also review all of your test results from your hospital stay or ER visit. Additional Information If you have questions, please visit the Frequently Asked Questions section of the [employer] website at [email or web address] Remember, [employer] is NOT to be used for urgent needs. For medical emergencies, dial 911. Now available from your iPhone and Android! Please provide this summary of care documentation to your next provider. Your primary care clinician is listed as [address] [place] [address] . If you have any questions after today's visit, please call [phone].

## 2017-12-06 ENCOUNTER — HOSPITAL ENCOUNTER (OUTPATIENT)
Dept: PHYSICAL THERAPY | Age: 67
Discharge: HOME | End: 2017-12-06
Payer: MEDICARE

## 2017-12-06 PROCEDURE — 97014 ELECTRIC STIMULATION THERAPY: CPT | Performed by: PHYSICAL THERAPIST

## 2017-12-06 PROCEDURE — 97140 MANUAL THERAPY 1/> REGIONS: CPT | Performed by: PHYSICAL THERAPIST

## 2017-12-06 PROCEDURE — 97110 THERAPEUTIC EXERCISES: CPT | Performed by: PHYSICAL THERAPIST

## 2017-12-06 NOTE — PROGRESS NOTES
PT DAILY TREATMENT NOTE - [facility] 2-15    Patient Name: Odessa Camargo  Date:2017  : 1950  [x]  Patient  Verified  Payor: [place] MEDICARE / Plan: [place] MEDICARE PART A & B / Product Type: Medicare /    In time:12:00p Out time:1:00p  Total Treatment Time (min): 60  Total Timed Codes (min): 60  1:1 Treatment Time ([facility] only):60  Visit #: 9    Treatment Area: Low back pain [M54.5]  Cervicalgia [M54.2]    SUBJECTIVE  Pain Level (0-10 scale): 810  Any medication changes, allergies to medications, adverse drug reactions, diagnosis change, or new procedure performed?: [x] No    [] Yes (see summary sheet for update)  Subjective functional status/changes:   [] No changes reported  The pt presents with increased pain in his back and left hip and leg.      OBJECTIVE                    Modality rationale: decrease inflammation, decrease pain and increase tissue extensibility to improve the patients ability to decrease LBP   Min Type Additional Details     15 [x] Estim: []Att   [x]Unatt        []TENS instruct                  [x]IFC  []Premod   []NMES                     []Other:  []w/US   [x]w/ice   []w/heat  Position: supine with bolster  Location: back and neck      []  Traction: [] Cervical       []Lumbar                       [] Prone          []Supine                       []Intermittent   []Continuous Lbs:  [] before manual  [] after manual  []w/heat      []  Ultrasound: []Continuous   [] Pulsed at:                           []1MHz   []3MHz Location:  W/cm2:      [] Paraffin      Location:   []w/heat    - [x]  Ice     []  Heat  []  Ice massage Position: supine  Location: back, hip, knee      []  Laser  []  Other: Position:  Location:      []  Vasopneumatic Device Pressure:       [] lo [] med [] hi   Temperature:        [x] Skin assessment post-treatment:  [x]intact []redness- no adverse reaction    []redness  adverse reaction:       35 min Therapeutic Exercise:  [x] See flow sheet :   Rationale: increase ROM, increase strength and improve coordination to improve the patients ability to increase function and mobility      25 min Manual Therapy:  lumbar hooklying traction, Cervical STM bilateral paraspinals   Rationale: decrease pain, increase ROM, increase tissue extensibility and decrease trigger points to improve the patients ability to increase mobility      With   [] TE   [] TA   [] neuro   [] other: Patient Education: [x] Review HEP    [] Progressed/Changed HEP based on:   [] positioning   [] body mechanics   [] transfers   [] heat/ice application    [] other:        Other Objective/Functional Measures: --                   Pain Level (0-10 scale) post treatment: 7/10      ASSESSMENT/Changes in Function:   Pt comes in with 8/10 back pain and left LE pain. He reports it occurred on Sunday but he reports he did not do anything on Saturday. Rested. Performed lumbar traction and he responded better to extension exercises today.  Gave for HEP and will determine response.        Patient will continue to benefit from skilled PT services to modify and progress therapeutic interventions, address functional mobility deficits, address ROM deficits, address strength deficits, analyze and address soft tissue restrictions, analyze and cue movement patterns and analyze and modify body mechanics/ergonomics to attain remaining goals.      [x]  See Plan of Care  []  See progress note/recertification  []  See Discharge Summary     Progress towards goals / Updated goals:  Updated GOALs:   Long Term Goals: To be accomplished in 4-6 weeks:  1) Pt will be able to read without increase of neck pain  2) Pt will be able to look over shoulders while driving without increase of neck pain  3) Pt will demonstrate ability to lift >/= 20 lbs without increase of symptoms  4) Pt will be able to Stand greater than 60 minutes without increase of pain  5)  Pt will be able to retrieve item form ground without pain      PLAN   []  Upgrade activities as tolerated     [x]  Continue plan of care  []  Update interventions per flow sheet       []  Discharge due to:_  []  Other:_        Noam Cavanaugh 12/6/2017  1:58 PM

## 2017-12-08 ENCOUNTER — HOSPITAL ENCOUNTER (OUTPATIENT)
Dept: PHYSICAL THERAPY | Age: 67
Discharge: HOME | End: 2017-12-08
Payer: MEDICARE

## 2017-12-08 PROCEDURE — 97014 ELECTRIC STIMULATION THERAPY: CPT | Performed by: PHYSICAL THERAPIST

## 2017-12-08 PROCEDURE — 97110 THERAPEUTIC EXERCISES: CPT | Performed by: PHYSICAL THERAPIST

## 2017-12-08 PROCEDURE — 97140 MANUAL THERAPY 1/> REGIONS: CPT | Performed by: PHYSICAL THERAPIST

## 2017-12-08 NOTE — PROGRESS NOTES
PT DAILY TREATMENT NOTE - [facility] 2-15    Patient Name: Odessa Camargo  Date:2017  : 1950  [x]  Patient  Verified  Payor: Ellie Breath / Plan: [place] MEDICARE PART A & B / Product Type: Medicare /    In time:8:10a Out time:9: 20p  Total Treatment Time (min): 70  Total Timed Codes (min): 70  1:1 Treatment Time ([address] only): 55  Visit #: 11    Treatment Area: Low back pain [M54.5]  Cervicalgia [M54.2]    SUBJECTIVE  Pain Level (0-10 scale): 4/10  Any medication changes, allergies to medications, adverse drug reactions, diagnosis change, or new procedure performed?: [x] No    [] Yes (see summary sheet for update)  Subjective functional status/changes:   [] No changes reported  Doing a little better today. \"scaitic pain is waking up\".     OBJECTIVE                    Modality rationale: decrease inflammation, decrease pain and increase tissue extensibility to improve the patients ability to decrease LBP   Min Type Additional Details     15 [x] Estim: []Att   [x]Unatt        []TENS instruct                  [x]IFC  []Premod   []NMES                     []Other:  []w/US   [x]w/ice   []w/heat  Position: supine with bolster  Location: back and neck      []  Traction: [] Cervical       []Lumbar                       [] Prone          []Supine                       []Intermittent   []Continuous Lbs:  [] before manual  [] after manual  []w/heat      []  Ultrasound: []Continuous   [] Pulsed at:                           []1MHz   []3MHz Location:  W/cm2:      [] Paraffin      Location:   []w/heat    - [x]  Ice     []  Heat  []  Ice massage Position: supine  Location: back, hip, knee      []  Laser  []  Other: Position:  Location:      []  Vasopneumatic Device Pressure:       [] lo [] med [] hi   Temperature:        [x] Skin assessment post-treatment:  [x]intact []redness- no adverse reaction    []redness  adverse reaction:       40 min Therapeutic Exercise:  [x] See flow sheet :   Rationale: increase ROM, increase strength and improve coordination to improve the patients ability to increase function and mobility      20 min Manual Therapy:  lumbar hooklying traction, Cervical STM bilateral paraspinals   Rationale: decrease pain, increase ROM, increase tissue extensibility and decrease trigger points to improve the patients ability to increase mobility      With   [] TE   [] TA   [] neuro   [] other: Patient Education: [x] Review HEP    [] Progressed/Changed HEP based on:   [] positioning   [] body mechanics   [] transfers   [] heat/ice application    [] other:        Other Objective/Functional Measures: --                   Pain Level (0-10 scale) post treatment: 7/10      ASSESSMENT/Changes in Function:   Pt back pain has been better  since last session. He reports \"I actually did the exercises\" and they helped with the pain I was having.  Tolerated addition of TA and core stabilization emphasizing posture and abdominal bracing even during UE exercises.       Patient will continue to benefit from skilled PT services to modify and progress therapeutic interventions, address functional mobility deficits, address ROM deficits, address strength deficits, analyze and address soft tissue restrictions, analyze and cue movement patterns and analyze and modify body mechanics/ergonomics to attain remaining goals.      [x]  See Plan of Care  []  See progress note/recertification  []  See Discharge Summary      Progress towards goals / Updated goals:  Updated GOALs:   Long Term Goals: To be accomplished in 4-6 weeks:  1) Pt will be able to read without increase of neck pain  2) Pt will be able to look over shoulders while driving without increase of neck pain  3) Pt will demonstrate ability to lift >/= 20 lbs without increase of symptoms  4) Pt will be able to Stand greater than 60 minutes without increase of pain  5)  Pt will be able to retrieve item form ground without pain      PLAN   []  Upgrade activities as tolerated     [x]  Continue plan of care  []  Update interventions per flow sheet       []  Discharge due to:_  []  Other:_      Ruben Gustafson 12/8/2017  8:10 AM

## 2017-12-12 ENCOUNTER — HOSPITAL ENCOUNTER (OUTPATIENT)
Dept: PHYSICAL THERAPY | Age: 67
Discharge: HOME | End: 2017-12-12
Payer: MEDICARE

## 2017-12-12 ENCOUNTER — HOSPITAL ENCOUNTER (OUTPATIENT)
Dept: MRI IMAGING | Age: 67
Discharge: HOME | End: 2017-12-12
Attending: FAMILY MEDICINE
Payer: MEDICARE

## 2017-12-12 DIAGNOSIS — M54.40 BACK PAIN OF LUMBAR REGION WITH SCIATICA: ICD-10-CM

## 2017-12-12 PROCEDURE — 97140 MANUAL THERAPY 1/> REGIONS: CPT | Performed by: PHYSICAL THERAPIST

## 2017-12-12 PROCEDURE — 72148 MRI LUMBAR SPINE W/O DYE: CPT

## 2017-12-12 PROCEDURE — 97014 ELECTRIC STIMULATION THERAPY: CPT | Performed by: PHYSICAL THERAPIST

## 2017-12-12 PROCEDURE — 97110 THERAPEUTIC EXERCISES: CPT | Performed by: PHYSICAL THERAPIST

## 2017-12-12 NOTE — PROGRESS NOTES
PT DAILY TREATMENT NOTE - [facility] 2-15    Patient Name: Marisa Leone Sr.  Date:2017  : 1950  [x]  Patient  Verified  Payor: Ellie Breath / Plan: [place] MED[place] & B / Product Type: Medicare /    In time:12:40p  Out time: 2:05p  Total Treatment Time (min): 85  Total Timed Codes (min): 70  1:1 Treatment Time ( [address] only): 70  Visit #: 12    Treatment Area: Low back pain [M54.5]  Cervicalgia [M54.2]    SUBJECTIVE  Pain Level (0-10 scale): 5/10  Any medication changes, allergies to medications, adverse drug reactions, diagnosis change, or new procedure performed?: [x] No    [] Yes (see summary sheet for update)  Subjective functional status/changes:   [] No changes reported  Pt reports he had an MRI this morning and has been running around. Back and leg pain started kicking in about 2 hours ago.      OBJECTIVE                    Modality rationale: decrease inflammation, decrease pain and increase tissue extensibility to improve the patients ability to decrease LBP   Min Type Additional Details     15 [x] Estim: []Att   [x]Unatt        []TENS instruct                  [x]IFC  []Premod   []NMES                     []Other:  []w/US   [x]w/ice   []w/heat  Position: supine with bolster  Location: back and neck      []  Traction: [] Cervical       []Lumbar                       [] Prone          []Supine                       []Intermittent   []Continuous Lbs:  [] before manual  [] after manual  []w/heat      []  Ultrasound: []Continuous   [] Pulsed at:                           []1MHz   []3MHz Location:  W/cm2:      [] Paraffin      Location:   []w/heat    - [x]  Ice     []  Heat  []  Ice massage Position: supine  Location: back, hip, knee      []  Laser  []  Other: Position:  Location:      []  Vasopneumatic Device Pressure:       [] lo [] med [] hi   Temperature:        [x] Skin assessment post-treatment:  [x]intact []redness- no adverse reaction    []redness  adverse reaction:       50 min Therapeutic Exercise:  [x] See flow sheet :   Rationale: increase ROM, increase strength and improve coordination to improve the patients ability to increase function and mobility      20 min Manual Therapy:  lumbar hooklying traction, Cervical STM bilateral paraspinals   Rationale: decrease pain, increase ROM, increase tissue extensibility and decrease trigger points to improve the patients ability to increase mobility      With   [] TE   [] TA   [] neuro   [] other: Patient Education: [x] Review HEP    [] Progressed/Changed HEP based on:   [] positioning   [] body mechanics   [] transfers   [] heat/ice application    [] other:        Other Objective/Functional Measures: --                   Pain Level (0-10 scale) post treatment: 7/10      ASSESSMENT/Changes in Function:         Patient will continue to benefit from skilled PT services to modify and progress therapeutic interventions, address functional mobility deficits, address ROM deficits, address strength deficits, analyze and address soft tissue restrictions, analyze and cue movement patterns and analyze and modify body mechanics/ergonomics to attain remaining goals.      [x]  See Plan of Care  []  See progress note/recertification  []  See Discharge Summary      Progress towards goals / Updated goals:  Updated GOALs:   Long Term Goals: To be accomplished in 4-6 weeks:  1) Pt will be able to read without increase of neck pain  2) Pt will be able to look over shoulders while driving without increase of neck pain  3) Pt will demonstrate ability to lift >/= 20 lbs without increase of symptoms  4) Pt will be able to Stand greater than 60 minutes without increase of pain  5)  Pt will be able to retrieve item form ground without pain      PLAN   []  Upgrade activities as tolerated     [x]  Continue plan of care  []  Update interventions per flow sheet       []  Discharge due to:_  []  Other:_     Denia Mahan 12/12/2017  2:00 PM

## 2017-12-14 ENCOUNTER — HOSPITAL ENCOUNTER (OUTPATIENT)
Dept: PHYSICAL THERAPY | Age: 67
Discharge: HOME | End: 2017-12-14
Payer: MEDICARE

## 2017-12-14 PROCEDURE — 97014 ELECTRIC STIMULATION THERAPY: CPT | Performed by: PHYSICAL THERAPIST

## 2017-12-14 PROCEDURE — 97140 MANUAL THERAPY 1/> REGIONS: CPT | Performed by: PHYSICAL THERAPIST

## 2017-12-14 PROCEDURE — 97110 THERAPEUTIC EXERCISES: CPT | Performed by: PHYSICAL THERAPIST

## 2017-12-14 NOTE — PROGRESS NOTES
PT DAILY TREATMENT NOTE - [facility] 2-15    Patient Name: Fatemeh Lang Sr.  Date:2017  : 1950  [x]  Patient  Verified  Payor: Anyi Muñoz / Plan: [place] MEDICARE PART A & B / Product Type: Medicare /    In time: 1:00p Out time:2:15p  Total Treatment Time (min): 75  Total Timed Codes (min): 75  1:1 Treatment Time ( [address] only): 60  Visit #: 13    Treatment Area: Low back pain [M54.5]  Cervicalgia [M54.2]    SUBJECTIVE  Pain Level (0-10 scale): 2/10  Any medication changes, allergies to medications, adverse drug reactions, diagnosis change, or new procedure performed?: [x] No    [] Yes (see summary sheet for update)  Subjective functional status/changes:   [] No changes reported  The pt is doing better today. Actually today is the best he has felt in a while.     OBJECTIVE                    Modality rationale: decrease inflammation, decrease pain and increase tissue extensibility to improve the patients ability to decrease LBP   Min Type Additional Details     15 [x] Estim: []Att   [x]Unatt        []TENS instruct                  [x]IFC  []Premod   []NMES                     []Other:  []w/US   [x]w/ice   []w/heat  Position: supine with bolster  Location: back and neck      []  Traction: [] Cervical       []Lumbar                       [] Prone          []Supine                       []Intermittent   []Continuous Lbs:  [] before manual  [] after manual  []w/heat      []  Ultrasound: []Continuous   [] Pulsed at:                           []1MHz   []3MHz Location:  W/cm2:      [] Paraffin      Location:   []w/heat    - [x]  Ice     []  Heat  []  Ice massage Position: supine  Location: back, hip, knee      []  Laser  []  Other: Position:  Location:      []  Vasopneumatic Device Pressure:       [] lo [] med [] hi   Temperature:        [x] Skin assessment post-treatment:  [x]intact []redness- no adverse reaction    []redness  adverse reaction:       40 min Therapeutic Exercise:  [x] See flow sheet :   Rationale: increase ROM, increase strength and improve coordination to improve the patients ability to increase function and mobility      10 min Manual Therapy:  lumbar hooklying traction     Rationale: decrease pain, increase ROM, increase tissue extensibility and decrease trigger points to improve the patients ability to increase mobility      With   [] TE   [] TA   [] neuro   [] other: Patient Education: [x] Review HEP    [] Progressed/Changed HEP based on:   [] positioning   [] body mechanics   [] transfers   [] heat/ice application    [] other:        Other Objective/Functional Measures: --                   Pain Level (0-10 scale) post treatment: 1/10      ASSESSMENT/Changes in Function:   The pt is doing much better today and showed compliance with exercises by running through them with great form without many verbal cues.         Patient will continue to benefit from skilled PT services to modify and progress therapeutic interventions, address functional mobility deficits, address ROM deficits, address strength deficits, analyze and address soft tissue restrictions, analyze and cue movement patterns and analyze and modify body mechanics/ergonomics to attain remaining goals.      [x]  See Plan of Care  []  See progress note/recertification  []  See Discharge Summary      Progress towards goals / Updated goals:  Updated GOALs:   [address] be accomplished in 4-6 weeks:  1) Pt will be able to read without increase of neck pain  2) Pt will be able to look over shoulders while driving without increase of neck pain  3) Pt will demonstrate ability to lift >/= 20 lbs without increase of symptoms  4) Pt will be able to Stand greater than 60 minutes without increase of pain  5)  Pt will be able to retrieve item form ground without pain      PLAN   []  Upgrade activities as tolerated     [x]  Continue plan of care  []  Update interventions per flow sheet       []  Discharge due to:_  []  Other:_     Candido Velazquez Sami 12/14/2017  1:27 PM

## 2017-12-19 ENCOUNTER — HOSPITAL ENCOUNTER (OUTPATIENT)
Dept: PHYSICAL THERAPY | Age: 67
Discharge: HOME | End: 2017-12-19
Payer: MEDICARE

## 2017-12-19 PROCEDURE — 97140 MANUAL THERAPY 1/> REGIONS: CPT | Performed by: PHYSICAL THERAPIST

## 2017-12-19 PROCEDURE — 97014 ELECTRIC STIMULATION THERAPY: CPT | Performed by: PHYSICAL THERAPIST

## 2017-12-19 PROCEDURE — 97110 THERAPEUTIC EXERCISES: CPT | Performed by: PHYSICAL THERAPIST

## 2017-12-19 NOTE — PROGRESS NOTES
PT DAILY TREATMENT NOTE - [facility] 2-15    Patient Name: Marino Garcia Sr.  Date:2017  : 1950  [x]  Patient  Verified  Payor: Shasha Hernandez / Plan: [place] MEDICARE PART A & B / Product Type: Medicare /    In time: 1:00p Out time: 2:15p  Total Treatment Time (min):75  Total Timed Codes (min): 75  1:1 Treatment Time ([address] only): 45  Visit #: 14    Treatment Area: Low back pain [M54.5]  Cervicalgia [M54.2]    SUBJECTIVE  Pain Level (0-10 scale):/10  Any medication changes, allergies to medications, adverse drug reactions, diagnosis change, or new procedure performed?: [x] No    [] Yes (see summary sheet for update)  Subjective functional status/changes:   [] No changes reported  The pt reports he was able to drive to [place] and back on . Tolerated well.      OBJECTIVE                    Modality rationale: decrease inflammation, decrease pain and increase tissue extensibility to improve the patients ability to decrease LBP   Min Type Additional Details     15 [x] Estim: []Att   [x]Unatt        []TENS instruct                  [x]IFC  []Premod   []NMES                     []Other:  []w/US   [x]w/ice   []w/heat  Position: supine with bolster  Location: back and neck      []  Traction: [] Cervical       []Lumbar                       [] Prone          []Supine                       []Intermittent   []Continuous Lbs:  [] before manual  [] after manual  []w/heat      []  Ultrasound: []Continuous   [] Pulsed at:                           []1MHz   []3MHz Location:  W/cm2:      [] Paraffin      Location:   []w/heat    - [x]  Ice     []  Heat  []  Ice massage Position: supine  Location: back, hip, knee      []  Laser  []  Other: Position:  Location:      []  Vasopneumatic Device Pressure:       [] lo [] med [] hi   Temperature:        [x] Skin assessment post-treatment:  [x]intact []redness- no adverse reaction    []redness  adverse reaction:       50 min Therapeutic Exercise:  [x] See flow sheet :   Rationale: increase ROM, increase strength and improve coordination to improve the patients ability to increase function and mobility      10 min Manual Therapy:  lumbar hooklying traction   Rationale: decrease pain, increase ROM, increase tissue extensibility and decrease trigger points to improve the patients ability to increase mobility      With   [] TE   [] TA   [] neuro   [] other: Patient Education: [x] Review HEP    [] Progressed/Changed HEP based on:   [] positioning   [] body mechanics   [] transfers   [] heat/ice application    [] other:        Other Objective/Functional Measures: --                   Pain Level (0-10 scale) post treatment: 1/10      ASSESSMENT/Changes in Function:   Overall pt is progressing well and feeling exercises are helping tremendously.  Decreased overall pain.       Patient will continue to benefit from skilled PT services to modify and progress therapeutic interventions, address functional mobility deficits, address ROM deficits, address strength deficits, analyze and address soft tissue restrictions, analyze and cue movement patterns and analyze and modify body mechanics/ergonomics to attain remaining goals.      [x]  See Plan of Care  []  See progress note/recertification  []  See Discharge Summary      Progress towards goals / Updated goals:  Updated GOALs:   Long Term Goals: To be accomplished in 4-6 weeks:  1) Pt will be able to read without increase of neck pain  2) Pt will be able to look over shoulders while driving without increase of neck pain  3) Pt will demonstrate ability to lift >/= 20 lbs without increase of symptoms  4) Pt will be able to Stand greater than 60 minutes without increase of pain  5)  Pt will be able to retrieve item form ground without pain      PLAN   []  Upgrade activities as tolerated     [x]  Continue plan of care  []  Update interventions per flow sheet       []  Discharge due to:_  []  Other:_     Claribel Brown 12/19/2017  1:28 PM

## 2017-12-21 ENCOUNTER — HOSPITAL ENCOUNTER (OUTPATIENT)
Dept: PHYSICAL THERAPY | Age: 67
Discharge: HOME | End: 2017-12-21
Payer: MEDICARE

## 2017-12-21 PROCEDURE — 97140 MANUAL THERAPY 1/> REGIONS: CPT | Performed by: PHYSICAL THERAPIST

## 2017-12-21 PROCEDURE — 97014 ELECTRIC STIMULATION THERAPY: CPT | Performed by: PHYSICAL THERAPIST

## 2017-12-22 NOTE — PROGRESS NOTES
PT DAILY TREATMENT NOTE 2-15    Patient Name: Lee Nickerson Sr.  Date:2017  : 1950  [x]  Patient  Verified  Payor: [place] MEDICARE / Plan: [place] MEDICARE PART A & B / Product Type: Medicare /    In time:1:15p Out time:2:20p  Total Treatment Time (min): 65  Visit #: 15    Treatment Area: Low back pain [M54.5]  Cervicalgia [M54.2]    SUBJECTIVE  Pain Level (0-10 scale): 0/10  Any medication changes, allergies to medications, adverse drug reactions, diagnosis change, or new procedure performed?: [x] No    [] Yes (see summary sheet for update)  Subjective functional status/changes:   [] No changes reported  Feeling  Almost 100% better.      OBJECTIVE                    Modality rationale: decrease inflammation, decrease pain and increase tissue extensibility to improve the patients ability to decrease LBP   Min Type Additional Details     15 [x] Estim: []Att   [x]Unatt        []TENS instruct                  [x]IFC  []Premod   []NMES                     []Other:  []w/US   [x]w/ice   []w/heat  Position: supine with bolster  Location: back and neck      []  Traction: [] Cervical       []Lumbar                       [] Prone          []Supine                       []Intermittent   []Continuous Lbs:  [] before manual  [] after manual  []w/heat      []  Ultrasound: []Continuous   [] Pulsed at:                           []1MHz   []3MHz Location:  W/cm2:      [] Paraffin      Location:   []w/heat    - [x]  Ice     []  Heat  []  Ice massage Position: supine  Location: back, hip, knee      []  Laser  []  Other: Position:  Location:      []  Vasopneumatic Device Pressure:       [] lo [] med [] hi   Temperature:        [x] Skin assessment post-treatment:  [x]intact []redness- no adverse reaction    []redness  adverse reaction:       50 min Therapeutic Exercise:  [x] See flow sheet :   Rationale: increase ROM, increase strength and improve coordination to improve the patients ability to increase function and mobility      10 min Manual Therapy:  lumbar hooklying traction   Rationale: decrease pain, increase ROM, increase tissue extensibility and decrease trigger points to improve the patients ability to increase mobility      With   [] TE   [] TA   [] neuro   [] other: Patient Education: [x] Review HEP    [] Progressed/Changed HEP based on:   [] positioning   [] body mechanics   [] transfers   [] heat/ice application    [] other:        Other Objective/Functional Measures: --                   Pain Level (0-10 scale) post treatment: 0/10      ASSESSMENT/Changes in Function:   Pt is doing very well and wants to try HEP until MD appointment on the 9th of January.       Patient will continue to benefit from skilled PT services to modify and progress therapeutic interventions, address functional mobility deficits, address ROM deficits, address strength deficits, analyze and address soft tissue restrictions, analyze and cue movement patterns and analyze and modify body mechanics/ergonomics to attain remaining goals.      [x]  See Plan of Care  []  See progress note/recertification  []  See Discharge Summary      Progress towards goals / Updated goals:  Updated GOALs:   Long Term Goals: To be accomplished in 4-6 weeks:  1) Pt will be able to read without increase of neck pain  2) Pt will be able to look over shoulders while driving without increase of neck pain  3) Pt will demonstrate ability to lift >/= 20 lbs without increase of symptoms  4) Pt will be able to Stand greater than 60 minutes without increase of pain  5)  Pt will be able to retrieve item form ground without pain      PLAN   []  Upgrade activities as tolerated     [x]  Continue plan of care  []  Update interventions per flow sheet       []  Discharge due to:_  []  Other:James Sheridan 12/22/2017  7:15 AM

## 2018-01-09 ENCOUNTER — OFFICE VISIT (OUTPATIENT)
Dept: INTERNAL MEDICINE CLINIC | Age: 68
End: 2018-01-09

## 2018-01-09 VITALS
WEIGHT: 169 LBS | RESPIRATION RATE: 16 BRPM | BODY MASS INDEX: 25.03 KG/M2 | HEART RATE: 59 BPM | SYSTOLIC BLOOD PRESSURE: 136 MMHG | TEMPERATURE: 98.6 F | DIASTOLIC BLOOD PRESSURE: 78 MMHG | OXYGEN SATURATION: 100 % | HEIGHT: 69 IN

## 2018-01-09 DIAGNOSIS — M54.5 ACUTE LOW BACK PAIN, UNSPECIFIED BACK PAIN LATERALITY, WITH SCIATICA PRESENCE UNSPECIFIED: Primary | ICD-10-CM

## 2018-01-09 NOTE — PROGRESS NOTES
Chief Complaint   Patient presents with    Back Pain     he is a 79y.o. year old male who presents for follow up of injury. Follow Up Pain Assessment Encounter      Onset of Symptoms: Several Weeks  ________________________________________________________________________  Description: Pain has improved      Pain Scale:(1-10): 2  Duration:  continuous  What makes it better?: rest  What makes it worse?:sitting  Medications tried: None  Modalities tried: PT        Reviewed and agree with Nurse Note and duplicated in this note. Reviewed PmHx, RxHx, FmHx, SocHx, AllgHx and updated and dated in the chart. No family history on file. No past medical history on file.    Social History     Social History    Marital status:      Spouse name: N/A    Number of children: N/A    Years of education: N/A     Social History Main Topics    Smoking status: Current Some Day Smoker     Types: Cigars    Smokeless tobacco: Current User    Alcohol use 1.2 oz/week     2 Glasses of wine per week    Drug use: No    Sexual activity: Not Asked     Other Topics Concern    None     Social History Narrative        Review of Systems - negative except as listed above      Objective:     Vitals:    01/09/18 1309   BP: 136/78   Pulse: (!) 59   Resp: 16   Temp: 98.6 °F (37 °C)   TempSrc: Oral   SpO2: 100%   Weight: 169 lb (76.7 kg)   Height: 5' 9\" (1.753 m)       Physical Examination: General appearance - alert, well appearing, and in no distress  Eyes - pupils equal and reactive, extraocular eye movements intact  Ears - bilateral TM's and external ear canals normal  Nose - normal and patent, no erythema, discharge or polyps  Mouth - mucous membranes moist, pharynx normal without lesions  Neck - supple, no significant adenopathy  Chest - clear to auscultation, no wheezes, rales or rhonchi, symmetric air entry  Heart - normal rate, regular rhythm, normal S1, S2, no murmurs, rubs, clicks or gallops  Abdomen - soft, nontender, nondistended, no masses or organomegaly  Neurological - alert, oriented, normal speech, no focal findings or movement disorder noted  Musculoskeletal - no joint tenderness, deformity or swelling  Extremities - peripheral pulses normal, no pedal edema, no clubbing or cyanosis  Skin - normal coloration and turgor, no rashes, no suspicious skin lesions noted    Assessment/ Plan:   Diagnoses and all orders for this visit:    1. Acute low back pain, unspecified back pain laterality, with sciatica presence unspecified    Will continue physical therapy at home and return to clinic with any new or worsening symptoms  Renal cysts on MRI scan, patient will call back with a nephrologist that he wants to be referred to. Follow-up Disposition:  Return if symptoms worsen or fail to improve. Pathophysiology, recovery and rehabilitation process discussed and questions answered   Counseling for 30 Minutes of the total visit duration   Pictures and figures used as necessary   Provided reassurance   Monitor response to Physical Therapy   Recommend activity modification   Recommend  lower impact activities-walking, Eliptical, Nordic Track, cycling or swimming   Follow up in 1 week(s)  Xray's reviewed - within normal limits    1) Remember to stay active and/or exercise regularly (I suggest 30-45 minutes daily)   2) For reliable dietary information, go to www. EATRIGHT.org. You may wish to consider seeing the nutritionist at [facility] [phone], also consider the [address] I have discussed the diagnosis with the patient and the intended plan as seen in the above orders. The patient has received an after-visit summary and questions were answered concerning future plans.      Medication Side Effects and Warnings were discussed with patient: yes  Patient Labs were reviewed and or requested: yes  Patient Past Records were reviewed and or requested  yes  I have discussed the diagnosis with the patient and the intended plan as seen in the above orders. The patient has received an after-visit summary and questions were answered concerning future plans. Pt agrees to call or return to clinic and/or go to closest ER with any worsening of symptoms. This may include, but not limited to increased fever (>100.4) with NSAIDS or Tylenol, increased edema, confusion, rash, worsening of presenting symptoms.

## 2018-01-09 NOTE — MR AVS SNAPSHOT
Visit Information Date & Time Provider Department Dept. Phone Encounter #  
 1/9/2018  1:15 PM [facility] MD [facility] [phone] [id number] Follow-up Instructions Return if symptoms worsen or fail to improve. Upcoming Health Maintenance Date Due FOBT Q 1 YEAR AGE 50-75 5/3/2000 Pneumococcal 65+ Low/Medium Risk (2 of 2 - PPSV23) 3/1/2018 MEDICARE YEARLY EXAM 7/4/2018 GLAUCOMA SCREENING Q2Y 9/14/2018 DTaP/Tdap/Td series (2 - Td) 3/1/2027 Allergies as of 1/9/2018  Review Complete On: 1/9/2018 By: [facility] MD Carlos  
 No Known Allergies Current Immunizations  Never Reviewed Name Date Influenza High Dose Vaccine PF 1/5/2017 Pneumococcal Conjugate (PCV-13) 3/1/2017 Tdap 3/1/2017 Zoster Vaccine, Live 5/10/2016 Not reviewed this visit You Were Diagnosed With   
  
 Codes Comments Acute low back pain, unspecified back pain laterality, with sciatica presence unspecified    -  Primary ICD-10-CM: M54.5 ICD-9-CM: 724.2 Vitals BP Pulse Temp Resp Height(growth percentile) Weight(growth percentile) 136/78 (BP 1 Location: Left arm, BP Patient Position: Sitting) (!) 59 98.6 °F (37 °C) (Oral) 16 5' 9\" (1.753 m) 169 lb (76.7 kg) SpO2 BMI Smoking Status 100% 24.96 kg/m2 Current Some Day Smoker Vitals History BMI and BSA Data Body Mass Index Body Surface Area 24.96 kg/m 2 1.93 m 2 Preferred Pharmacy Pharmacy Name Phone [address], [address] [phone] Your Updated Medication List  
  
   
This list is accurate as of: 1/9/18  1:22 PM.  Always use your most recent med list.  
  
  
  
  
 Cholecalciferol (Vitamin D3) 2,000 unit Cap capsule Commonly known as:  VITAMIN D3 Take  by mouth two (2) times a day. FISH OIL 1,000 mg Cap Generic drug:  omega-3 fatty acids-vitamin e Take 1 Cap by mouth. FLONASE 50 mcg/actuation nasal spray Generic drug:  fluticasone 2 Sprays by Both Nostrils route daily. glucosamine-chondroitin 1,500-1,200 mg/30 mL Liqd Commonly known as:  ELATION Take  by mouth. JUBLIA Nettie topical solution Generic drug:  efinaconazole Apply  to affected area daily. rosuvastatin 5 mg tablet Commonly known as:  CRESTOR Take 1 Tab by mouth nightly. TRAVATAN Z 0.004 % ophthalmic solution Generic drug:  travoprost  
INSTILL 1 DROP INTO BOTH EYES IN THE EVENING  
  
 VITAMIN C 500 mg tablet Generic drug:  ascorbic acid (vitamin C) Take 1,000 mg by mouth. Follow-up Instructions Return if symptoms worsen or fail to improve. Introducing [facility]! Dear Chandu Reyna: 
Thank you for requesting a [employer] account. Our records indicate that you already have an active [employer] account. You can access your account anytime at [email or web address] Did you know that you can access your hospital and ER discharge instructions at any time in [employer]? You can also review all of your test results from your hospital stay or ER visit. Additional Information If you have questions, please visit the Frequently Asked Questions section of the [employer] website at [email or web address] Remember, [employer] is NOT to be used for urgent needs. For medical emergencies, dial 911. Now available from your iPhone and Android! Please provide this summary of care documentation to your next provider. Your primary care clinician is listed as Emiliano Brown. If you have any questions after today's visit, please call [phone].

## 2018-03-13 ENCOUNTER — OFFICE VISIT (OUTPATIENT)
Dept: INTERNAL MEDICINE CLINIC | Age: 68
End: 2018-03-13

## 2018-03-13 VITALS
TEMPERATURE: 98.4 F | BODY MASS INDEX: 25.3 KG/M2 | OXYGEN SATURATION: 98 % | HEIGHT: 69 IN | RESPIRATION RATE: 17 BRPM | HEART RATE: 76 BPM | SYSTOLIC BLOOD PRESSURE: 138 MMHG | WEIGHT: 170.8 LBS | DIASTOLIC BLOOD PRESSURE: 77 MMHG

## 2018-03-13 DIAGNOSIS — N28.1 RENAL CYST: Primary | ICD-10-CM

## 2018-03-13 DIAGNOSIS — M54.50 LOW BACK PAIN WITHOUT SCIATICA, UNSPECIFIED BACK PAIN LATERALITY, UNSPECIFIED CHRONICITY: ICD-10-CM

## 2018-03-13 NOTE — MR AVS SNAPSHOT
[address] [place] [address]. [place] [address] [id number] 
[phone] Patient: Sebastián Brasher. MRN: [id number] QPS:3/3/6492 Visit Information Date & Time Provider Department Dept. Phone Encounter #  
 3/13/2018 11:00 AM Live Ashby MD [facility] and [place] [address] [id number] Follow-up Instructions Return if symptoms worsen or fail to improve. Upcoming Health Maintenance Date Due FOBT Q 1 YEAR AGE 50-75 5/3/2000 Bone Densitometry (Dexa) Screening 5/3/2015 Pneumococcal 65+ Low/Medium Risk (2 of 2 - PPSV23) 3/1/2018 MEDICARE YEARLY EXAM 7/4/2018 GLAUCOMA SCREENING Q2Y 9/14/2018 DTaP/Tdap/Td series (2 - Td) 3/1/2027 Allergies as of 3/13/2018  Review Complete On: 3/13/2018 By: Vivi Sheridan LPN No Known Allergies Current Immunizations  Never Reviewed Name Date Influenza High Dose Vaccine PF 1/5/2017 Pneumococcal Conjugate (PCV-13) 3/1/2017 Tdap 3/1/2017 Zoster Vaccine, Live 5/10/2016 Not reviewed this visit Vitals BP Pulse Temp Resp Height(growth percentile) Weight(growth percentile) 138/77 (BP 1 Location: Left arm, BP Patient Position: Sitting) 76 98.4 °F (36.9 °C) (Oral) 17 5' 9\" (1.753 m) 170 lb 12.8 oz (77.5 kg) SpO2 BMI Smoking Status 98% 25.22 kg/m2 Current Some Day Smoker Vitals History BMI and BSA Data Body Mass Index Body Surface Area  
 25.22 kg/m 2 1.94 m 2 Preferred Pharmacy Pharmacy Name Phone [facility], [phone] [phone] Your Updated Medication List  
  
   
This list is accurate as of 3/13/18 11:46 AM.  Always use your most recent med list.  
  
  
  
  
 Cholecalciferol (Vitamin D3) 2,000 unit Cap capsule Commonly known as:  VITAMIN D3 Take  by mouth two (2) times a day. FISH OIL 1,000 mg Cap Generic drug:  omega-3 fatty acids-vitamin e Take 1 Cap by mouth. FLONASE 50 mcg/actuation nasal spray Generic drug:  fluticasone 2 Sprays by Both Nostrils route daily. glucosamine-chondroitin 1,500-1,200 mg/30 mL Liqd Commonly known as:  ELATION Take  by mouth. JUBLIA Nettie topical solution Generic drug:  efinaconazole Apply  to affected area daily. rosuvastatin 5 mg tablet Commonly known as:  CRESTOR Take 1 Tab by mouth nightly. TRAVATAN Z 0.004 % ophthalmic solution Generic drug:  travoprost  
INSTILL 1 DROP INTO BOTH EYES IN THE EVENING  
  
 VITAMIN C 500 mg tablet Generic drug:  ascorbic acid (vitamin C) Take 1,000 mg by mouth. Follow-up Instructions Return if symptoms worsen or fail to improve. Introducing [facility]! Dear Thierry Pryor: 
Thank you for requesting a BlueWhale account. Our records indicate that you already have an active BlueWhale account. You can access your account anytime at [email or web address] Did you know that you can access your hospital and ER discharge instructions at any time in BlueWhale? You can also review all of your test results from your hospital stay or ER visit. Additional Information If you have questions, please visit the Frequently Asked Questions section of the BlueWhale website at [email or web address] Remember, BlueWhale is NOT to be used for urgent needs. For medical emergencies, dial 911. Now available from your iPhone and Android! Please provide this summary of care documentation to your next provider. Your primary care clinician is listed as Wisam Malone. If you have any questions after today's visit, please call [phone].

## 2018-03-13 NOTE — PROGRESS NOTES
Chief Complaint   Patient presents with    Back Pain    Abnormal Lab Results     he is a 79y.o. year old male who presents for evaluation of MRI results  Patient presents to office to review his back MRI results. Patient states his back pain is back to his normal state of about 5 out of 10. No radiation and no change. Patient states that he does not want to go to formal physical therapy but knows what to do, that he is waiting for the weather to warm up before he starts his exercise program again. He is followed up on his incidental findings of renal cysts with Dr. Tiarra Chen, and after review it was deemed that these are normal renal cysts with no further eval needed for patient. Reviewed and agree with Nurse Note and duplicated in this note. Reviewed PmHx, RxHx, FmHx, SocHx, AllgHx and updated and dated in the chart. No family history on file. No past medical history on file.    Social History     Social History    Marital status:      Spouse name: N/A    Number of children: N/A    Years of education: N/A     Social History Main Topics    Smoking status: Current Some Day Smoker     Types: Cigars    Smokeless tobacco: Current User    Alcohol use 1.2 oz/week     2 Glasses of wine per week    Drug use: No    Sexual activity: Not Asked     Other Topics Concern    None     Social History Narrative        Review of Systems - negative except as listed above      Objective:     Vitals:    03/13/18 1126   BP: 138/77   Pulse: 76   Resp: 17   Temp: 98.4 °F (36.9 °C)   TempSrc: Oral   SpO2: 98%   Weight: 170 lb 12.8 oz (77.5 kg)   Height: 5' 9\" (1.753 m)       Physical Examination: General appearance - alert, well appearing, and in no distress  Back exam - limited range of motion, pain with motion noted during exam, tenderness noted bilateral lower back, positive straight-leg raise   Neurological - alert, oriented, normal speech, no focal findings or movement disorder noted  Musculoskeletal - no joint tenderness, deformity or swelling  Extremities - peripheral pulses normal, no pedal edema, no clubbing or cyanosis  Skin - normal coloration and turgor, no rashes, no suspicious skin lesions noted    Assessment/ Plan:   Diagnoses and all orders for this visit:    1. Renal cyst    2. Low back pain without sciatica, unspecified back pain laterality, unspecified chronicity     Pt will continue HEP   ACAC script given to patient  Follow-up Disposition:  Return if symptoms worsen or fail to improve. Discussed the patient's I have reviewed/discussed the above normal BMI with the patient. I have recommended the following interventions: encourage exercise . BMI with him. The BMI follow up plan is as follows: I have counseled this patient on diet and exercise regimens    1) Remember to stay active and/or exercise regularly (I suggest 30-45 minutes daily)   2) For reliable dietary information, go to www. EATRIGHT.org. You may wish to consider seeing the nutritionist at [facility] [phone], also consider the [address] 3) I routinely suggest a complete physical exam once each year (your birth month)  I have discussed the diagnosis with the patient and the intended plan as seen in the above orders. The patient has received an after-visit summary and questions were answered concerning future plans. Medication Side Effects and Warnings were discussed with patient: yes  Patient Labs were reviewed and or requested: yes  Patient Past Records were reviewed and or requested  yes  I have discussed the diagnosis with the patient and the intended plan as seen in the above orders. Pt agrees to call or return to clinic and/or go to closest ER with any worsening of symptoms. This may include, but not limited to increased fever (>100.4) with NSAIDS or Tylenol, increased edema, confusion, rash, worsening of presenting symptoms.
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
## 2018-05-24 ENCOUNTER — OFFICE VISIT (OUTPATIENT)
Dept: INTERNAL MEDICINE CLINIC | Age: 68
End: 2018-05-24

## 2018-05-24 VITALS
HEIGHT: 69 IN | DIASTOLIC BLOOD PRESSURE: 84 MMHG | WEIGHT: 167.2 LBS | SYSTOLIC BLOOD PRESSURE: 149 MMHG | OXYGEN SATURATION: 99 % | BODY MASS INDEX: 24.76 KG/M2 | HEART RATE: 69 BPM

## 2018-05-24 DIAGNOSIS — M51.36 DDD (DEGENERATIVE DISC DISEASE), LUMBAR: ICD-10-CM

## 2018-05-24 DIAGNOSIS — M25.571 CHRONIC PAIN OF RIGHT ANKLE: Primary | ICD-10-CM

## 2018-05-24 DIAGNOSIS — G89.29 CHRONIC PAIN OF RIGHT ANKLE: Primary | ICD-10-CM

## 2018-05-24 NOTE — PROGRESS NOTES
Chief Complaint   Patient presents with    Back Pain     follow up, lower    Ankle Pain     right ankle pain follow up, hurts most going up stairs     he is a 76y.o. year old male who presents for follow up of injury. Follow Up Pain Assessment Encounter      Onset of Symptoms: 11/2017  ________________________________________________________________________  Description: Pain in back is now has improved. Right ankle is still hurting. Pain Scale:(1-10): 2  Duration:  brief  What makes it better?: heat  What makes it worse?:sitting to long and awkward positions for back pain, sudden movements for right ankle pain. Medications tried: aleve PRN  Modalities tried: PT        Reviewed and agree with Nurse Note and duplicated in this note. Reviewed PmHx, RxHx, FmHx, SocHx, AllgHx and updated and dated in the chart. No family history on file. No past medical history on file. Social History     Social History    Marital status:      Spouse name: N/A    Number of children: N/A    Years of education: N/A     Social History Main Topics    Smoking status: Current Some Day Smoker     Types: Cigars    Smokeless tobacco: Current User    Alcohol use 1.2 oz/week     2 Glasses of wine per week    Drug use: No    Sexual activity: Not on file     Other Topics Concern    Not on file     Social History Narrative        Review of Systems - negative except as listed above      Objective:     Vitals:    05/24/18 1605   BP: 149/84   Pulse: 69   SpO2: 99%   Weight: 167 lb 3.2 oz (75.8 kg)   Height: 5' 9\" (1.753 m)       Physical Examination: General appearance - alert, well appearing, and in no distress  Back exam - full range of motion, no tenderness, palpable spasm or pain on motion  Neurological - alert, oriented, normal speech, no focal findings or movement disorder noted  Musculoskeletal - A right ankle exam was performed.   Warmth: no warmth  Tenderness: mild  Strength: normal  Gait: normal    Palpation:  ATFL:  non-tender  CFL:  non-tender  PTFL:  non-tender  Syndesmosis Ligament:  non-tender  Deltoid ligament:  non-tender  Posterior Tibialis Tendon:  non-tender  Peroneal Tendon: non-tender  Achilles Tendon:  non-tender     Proximal Fibula:  non-tender  Proximal Tibia:  non-tender  Distal Fibula:  non-tender  Distal Tibia:  non-tender    Plantar Fascia: non-tender  Midfoot:  non-tender  Forefoot:  non-tender    ROM:  Plantar Flexion:  normal  Dorsiflexion:  normal    Squeeze Test: negative  Talar Tilt Test:  negative  Anterior Drawer:negative    Kleiger Test:  negative  Hop Test: negative  [place]: negative      Extremities - peripheral pulses normal, no pedal edema, no clubbing or cyanosis  Skin - normal coloration and turgor, no rashes, no suspicious skin lesions noted    Assessment/ Plan:   Diagnoses and all orders for this visit:    1. Chronic pain of right ankle  -     REFERRAL TO PHYSICAL THERAPY  -     MRI ANKLE RT WO CONT; Future    2. DDD (degenerative disc disease), lumbar       Follow-up Disposition: Not on File    Pathophysiology, recovery and rehabilitation process discussed and questions answered   Counseling for 30 Minutes of the total visit duration   Pictures and figures used as necessary   Provided reassurance   Monitor response to Physical Therapy   Recommend activity modification   Recommend  lower impact activities-walking, Eliptical, Nordic Track, cycling or swimming     Patient was informed/counseled on: Body mass index is 24.69 kg/(m^2). 1) Remember to stay active and/or exercise regularly (I suggest 30-45 minutes daily)   2) For reliable dietary information, go to www. EATRIGHT.org. You may wish to consider seeing the nutritionist at [facility] [phone], also consider the [address] I have discussed the diagnosis with the patient and the intended plan as seen in the above orders.   The patient has received an after-visit summary and questions were answered concerning future plans. Medication Side Effects and Warnings were discussed with patient: yes  Patient Labs were reviewed and or requested: yes  Patient Past Records were reviewed and or requested  yes  I have discussed the diagnosis with the patient and the intended plan as seen in the above orders. The patient has received an after-visit summary and questions were answered concerning future plans. Pt agrees to call or return to clinic and/or go to closest ER with any worsening of symptoms. This may include, but not limited to increased fever (>100.4) with NSAIDS or Tylenol, increased edema, confusion, rash, worsening of presenting symptoms.

## 2018-05-24 NOTE — MR AVS SNAPSHOT
[address] [place] [address] [address] [address] 
[phone] Patient: Siobhan Montoya. MRN: [id number] FY:2/5/[birth year]63 Visit Information Date & Time Provider Department Dept. Phone Encounter #  
 5/24/2018  4:00 PM Gilberto Gomez MD [facility] Medicine and Primary Care [phone] [id number] Follow-up Instructions Return if symptoms worsen or fail to improve. Follow-up and Disposition History Upcoming Health Maintenance Date Due FOBT Q 1 YEAR AGE 50-75 5/3/2000 Pneumococcal 65+ Low/Medium Risk (2 of 2 - PPSV23) 3/1/2018 MEDICARE YEARLY EXAM 7/4/2018 Influenza Age 5 to Adult 8/1/2018 GLAUCOMA SCREENING Q2Y 9/14/2018 DTaP/Tdap/Td series (2 - Td) 3/1/2027 Allergies as of 5/24/2018  Review Complete On: 5/24/2018 By: Gilberto Gomez MD  
 No Known Allergies Current Immunizations  Never Reviewed Name Date Influenza High Dose Vaccine PF 1/5/2017 Pneumococcal Conjugate (PCV-13) 3/1/2017 Tdap 3/1/2017 Zoster Vaccine, Live 5/10/2016 Not reviewed this visit You Were Diagnosed With   
  
 Codes Comments Chronic pain of right ankle    -  Primary ICD-10-CM: M25.571, G89.29 ICD-9-CM: 719.47, 338.29   
 DDD (degenerative disc disease), lumbar     ICD-10-CM: M51.36 
ICD-9-CM: 722.52 Vitals BP Pulse Height(growth percentile) Weight(growth percentile) SpO2 BMI  
 149/84 (BP 1 Location: Right arm, BP Patient Position: Sitting) 69 5' 9\" (1.753 m) 167 lb 3.2 oz (75.8 kg) 99% 24.69 kg/m2 Smoking Status Current Some Day Smoker Vitals History BMI and BSA Data Body Mass Index Body Surface Area  
 24.69 kg/m 2 1.92 m 2 Preferred Pharmacy Pharmacy Name Phone [facility], [phone] [phone] Your Updated Medication List  
  
   
 This list is accurate as of 5/24/18  4:51 PM.  Always use your most recent med list.  
  
  
  
  
 Cholecalciferol (Vitamin D3) 2,000 unit Cap capsule Commonly known as:  VITAMIN D3 Take  by mouth two (2) times a day. FISH OIL 1,000 mg Cap Generic drug:  omega-3 fatty acids-vitamin e Take 1 Cap by mouth. FLONASE 50 mcg/actuation nasal spray Generic drug:  fluticasone 2 Sprays by Both Nostrils route daily. glucosamine-chondroitin 1,500-1,200 mg/30 mL Liqd Commonly known as:  ELATION Take  by mouth. JUBLIA Nettie topical solution Generic drug:  efinaconazole Apply  to affected area daily. rosuvastatin 5 mg tablet Commonly known as:  CRESTOR Take 1 Tab by mouth nightly. TRAVATAN Z 0.004 % ophthalmic solution Generic drug:  travoprost  
INSTILL 1 DROP INTO BOTH EYES IN THE EVENING  
  
 VITAMIN C 500 mg tablet Generic drug:  ascorbic acid (vitamin C) Take 1,000 mg by mouth. We Performed the Following REFERRAL TO PHYSICAL THERAPY [I Custom] Follow-up Instructions Return if symptoms worsen or fail to improve. To-Do List   
 05/24/2018 Imaging:  MRI ANKLE RT WO CONT Referral Information Referral ID Referred By Referred To  
  
 [id number] [facility] OP PT THANIA   
   [address], [address] Phone: [phone] Fax: [phone] Visits Status Start Date End Date  
 20 New Request 5/24/18 5/24/19 If your referral has a status of pending review or denied, additional information will be sent to support the outcome of this decision. Introducing [facility]! Dear Judy Spence: 
Thank you for requesting a Kidzillions account. Our records indicate that you already have an active Kidzillions account. You can access your account anytime at [email or web address] Did you know that you can access your hospital and ER discharge instructions at any time in Charge Payment? You can also review all of your test results from your hospital stay or ER visit. Additional Information If you have questions, please visit the Frequently Asked Questions section of the Charge Payment website at [email or web address] Remember, Charge Payment is NOT to be used for urgent needs. For medical emergencies, dial 911. Now available from your iPhone and Android! Please provide this summary of care documentation to your next provider. Your primary care clinician is listed as [address] [place] [address] . If you have any questions after today's visit, please call [phone].

## 2018-06-04 ENCOUNTER — HOSPITAL ENCOUNTER (OUTPATIENT)
Dept: MRI IMAGING | Age: 68
Discharge: HOME | End: 2018-06-04
Attending: FAMILY MEDICINE
Payer: MEDICARE

## 2018-06-04 DIAGNOSIS — M95.8 OSTEOCHONDRAL DEFECT OF ANKLE: Primary | ICD-10-CM

## 2018-06-04 DIAGNOSIS — G89.29 CHRONIC PAIN OF RIGHT ANKLE: ICD-10-CM

## 2018-06-04 DIAGNOSIS — M25.571 CHRONIC PAIN OF RIGHT ANKLE: ICD-10-CM

## 2018-06-04 PROCEDURE — 73721 MRI JNT OF LWR EXTRE W/O DYE: CPT

## 2018-06-04 NOTE — PROGRESS NOTES
Patient has a cartilage defect in the ankle. We will need to get him seen by foot ortho. I will put a referral in for Rubi Live at [facility].    rb

## 2018-06-08 ENCOUNTER — OFFICE VISIT (OUTPATIENT)
Dept: INTERNAL MEDICINE CLINIC | Age: 68
End: 2018-06-08

## 2018-06-08 VITALS
BODY MASS INDEX: 25.02 KG/M2 | HEART RATE: 66 BPM | SYSTOLIC BLOOD PRESSURE: 147 MMHG | TEMPERATURE: 98.3 F | DIASTOLIC BLOOD PRESSURE: 81 MMHG | WEIGHT: 168.9 LBS | RESPIRATION RATE: 16 BRPM | HEIGHT: 69 IN | OXYGEN SATURATION: 98 %

## 2018-06-08 DIAGNOSIS — G89.29 CHRONIC PAIN OF RIGHT ANKLE: Primary | ICD-10-CM

## 2018-06-08 DIAGNOSIS — M25.571 CHRONIC PAIN OF RIGHT ANKLE: Primary | ICD-10-CM

## 2018-06-08 NOTE — PROGRESS NOTES
Chief Complaint   Patient presents with    Ankle Pain     he is a 76y.o. year old male who presents for follow up of injury. Follow Up Pain Assessment Encounter      Onset of Symptoms: a couple months now  ________________________________________________________________________  Description: Pain is now the same. Patient is following post MRI. Pain Scale:(1-10): 3  Duration:  brief  What makes it better?: ice, OTC meds, rest and sitting  What makes it worse?:exercise, walking and use  Medications tried: acetaminophen, ibuprofen  Modalities tried: MRI        Reviewed and agree with Nurse Note and duplicated in this note. Reviewed PmHx, RxHx, FmHx, SocHx, AllgHx and updated and dated in the chart. History reviewed. No pertinent family history. History reviewed. No pertinent past medical history. Social History     Social History    Marital status:      Spouse name: N/A    Number of children: N/A    Years of education: N/A     Social History Main Topics    Smoking status: Current Some Day Smoker     Types: Cigars    Smokeless tobacco: Current User    Alcohol use 1.2 oz/week     2 Glasses of wine per week    Drug use: No    Sexual activity: Yes     Other Topics Concern    None     Social History Narrative        Review of Systems - negative except as listed above      Objective:     Vitals:    06/08/18 0948   BP: 147/81   Pulse: 66   Resp: 16   Temp: 98.3 °F (36.8 °C)   TempSrc: Oral   SpO2: 98%   Weight: 168 lb 14.4 oz (76.6 kg)   Height: 5' 9\" (1.753 m)       Physical Examination: General appearance - alert, well appearing, and in no distress  Back exam - full range of motion, no tenderness, palpable spasm or pain on motion  Neurological - alert, oriented, normal speech, no focal findings or movement disorder noted  Musculoskeletal - A right ankle exam was performed.   Warmth: no warmth  Tenderness: mild  Strength: normal  Gait: normal    Palpation:  ATFL:  non-tender  CFL: non-tender  PTFL:  non-tender  Syndesmosis Ligament:  non-tender  Deltoid ligament:  non-tender  Posterior Tibialis Tendon:  non-tender  Peroneal Tendon: non-tender  Achilles Tendon:  non-tender     Proximal Fibula:  non-tender  Proximal Tibia:  non-tender  Distal Fibula:  non-tender  Distal Tibia:  non-tender    Plantar Fascia: non-tender  Midfoot:  non-tender  Forefoot:  non-tender    ROM:  Plantar Flexion:  normal  Dorsiflexion:  normal    Squeeze Test: negative  Talar Tilt Test:  negative  Anterior Drawer:negative    Kleiger Test:  negative  Hop Test: negative  [place]: negative      Extremities - peripheral pulses normal, no pedal edema, no clubbing or cyanosis  Skin - normal coloration and turgor, no rashes, no suspicious skin lesions noted    Assessment/ Plan:   Diagnoses and all orders for this visit:    1. Chronic pain of right ankle  OCD lesion on MRI   will benefit from ankle rehab and also referral to specialist  Follow-up Disposition: Not on File    Pathophysiology, recovery and rehabilitation process discussed and questions answered   Counseling for 30 Minutes of the total visit duration   Pictures and figures used as necessary   Recommend  lower impact activities-walking, Eliptical, Nordic Track, cycling or swimming   Referral to: Orthopedics,  Dr. Amber Peralta for surgical evaluation    Patient was informed/counseled on: Body mass index is 24.94 kg/(m^2). 1) Remember to stay active and/or exercise regularly (I suggest 30-45 minutes daily)   2) For reliable dietary information, go to www. EATRIGHT.org. You may wish to consider seeing the nutritionist at [facility] [phone], also consider the [address] I have discussed the diagnosis with the patient and the intended plan as seen in the above orders. The patient has received an after-visit summary and questions were answered concerning future plans.      Medication Side Effects and Warnings were discussed with patient: yes  Patient Labs were reviewed and or requested: yes  Patient Past Records were reviewed and or requested  yes  I have discussed the diagnosis with the patient and the intended plan as seen in the above orders. The patient has received an after-visit summary and questions were answered concerning future plans. Pt agrees to call or return to clinic and/or go to closest ER with any worsening of symptoms. This may include, but not limited to increased fever (>100.4) with NSAIDS or Tylenol, increased edema, confusion, rash, worsening of presenting symptoms.

## 2018-06-08 NOTE — MR AVS SNAPSHOT
[facility] [address] 
[phone] Patient: Magan Abdullahi. MRN: [id number] PWH:0/0/3030 Visit Information Date & Time Provider Department Dept. Phone Encounter #  
 6/8/2018  9:30 AM Blanca Phelan MD [facility] and [place] [address] [id number] Follow-up Instructions Return if symptoms worsen or fail to improve. Follow-up and Disposition History Upcoming Health Maintenance Date Due FOBT Q 1 YEAR AGE 50-75 5/3/2000 Pneumococcal 65+ Low/Medium Risk (2 of 2 - PPSV23) 6/8/2019* Influenza Age 5 to Adult 8/1/2018 GLAUCOMA SCREENING Q2Y 9/14/2018 DTaP/Tdap/Td series (2 - Td) 3/1/2027 *Topic was postponed. The date shown is not the original due date. Allergies as of 6/8/2018  Review Complete On: 6/8/2018 By: Blanca Phelan MD  
 No Known Allergies Current Immunizations  Never Reviewed Name Date Influenza High Dose Vaccine PF 1/5/2017 Pneumococcal Conjugate (PCV-13) 3/1/2017 Tdap 3/1/2017 Zoster Vaccine, Live 5/10/2016 Not reviewed this visit You Were Diagnosed With   
  
 Codes Comments Chronic pain of right ankle    -  Primary ICD-10-CM: M25.571, G89.29 ICD-9-CM: 719.47, 338.29 Vitals BP Pulse Temp Resp Height(growth percentile) Weight(growth percentile) 147/81 (BP 1 Location: Right arm, BP Patient Position: Sitting) 66 98.3 °F (36.8 °C) (Oral) 16 5' 9\" (1.753 m) 168 lb 14.4 oz (76.6 kg) SpO2 BMI Smoking Status 98% 24.94 kg/m2 Current Some Day Smoker Vitals History BMI and BSA Data Body Mass Index Body Surface Area 24.94 kg/m 2 1.93 m 2 Preferred Pharmacy Pharmacy Name Phone [address], [address] [place] [phone] Your Updated Medication List  
  
   
 This list is accurate as of 6/8/18 10:09 AM.  Always use your most recent med list.  
  
  
  
  
 Cholecalciferol (Vitamin D3) 2,000 unit Cap capsule Commonly known as:  VITAMIN D3 Take  by mouth two (2) times a day. FISH OIL 1,000 mg Cap Generic drug:  omega-3 fatty acids-vitamin e Take 1 Cap by mouth. FLONASE 50 mcg/actuation nasal spray Generic drug:  fluticasone 2 Sprays by Both Nostrils route daily. glucosamine-chondroitin 1,500-1,200 mg/30 mL Liqd Commonly known as:  ELATION Take  by mouth. JUBLIA Nettie topical solution Generic drug:  efinaconazole Apply  to affected area daily. rosuvastatin 5 mg tablet Commonly known as:  CRESTOR Take 1 Tab by mouth nightly. TRAVATAN Z 0.004 % ophthalmic solution Generic drug:  travoprost  
INSTILL 1 DROP INTO BOTH EYES IN THE EVENING  
  
 VITAMIN C 500 mg tablet Generic drug:  ascorbic acid (vitamin C) Take 1,000 mg by mouth. Follow-up Instructions Return if symptoms worsen or fail to improve. Introducing \[facility]\"" & HEALTH SERVICES! Dear Earnest Conner: 
Thank you for requesting a KTM Advance account. Our records indicate that you already have an active KTM Advance account. You can access your account anytime at [email or web address] Did you know that you can access your hospital and ER discharge instructions at any time in KTM Advance? You can also review all of your test results from your hospital stay or ER visit. Additional Information If you have questions, please visit the Frequently Asked Questions section of the KTM Advance website at [email or web address] Remember, KTM Advance is NOT to be used for urgent needs. For medical emergencies, dial 911. Now available from your iPhone and Android! Please provide this summary of care documentation to your next provider. Your primary care clinician is listed as [address] [place] [address]   If you have any questions after today's visit, please call [phone].

## 2018-07-23 ENCOUNTER — OFFICE VISIT (OUTPATIENT)
Dept: INTERNAL MEDICINE CLINIC | Age: 68
End: 2018-07-23

## 2018-07-23 VITALS
WEIGHT: 167.9 LBS | TEMPERATURE: 98.4 F | HEIGHT: 69 IN | HEART RATE: 71 BPM | SYSTOLIC BLOOD PRESSURE: 146 MMHG | OXYGEN SATURATION: 98 % | BODY MASS INDEX: 24.87 KG/M2 | DIASTOLIC BLOOD PRESSURE: 77 MMHG | RESPIRATION RATE: 16 BRPM

## 2018-07-23 DIAGNOSIS — R09.81 NASAL CONGESTION: ICD-10-CM

## 2018-07-23 DIAGNOSIS — Z00.00 MEDICARE ANNUAL WELLNESS VISIT, SUBSEQUENT: Primary | ICD-10-CM

## 2018-07-23 DIAGNOSIS — Z13.39 SCREENING FOR ALCOHOLISM: ICD-10-CM

## 2018-07-23 DIAGNOSIS — Z13.31 SCREENING FOR DEPRESSION: ICD-10-CM

## 2018-07-23 DIAGNOSIS — Z12.11 SCREEN FOR COLON CANCER: ICD-10-CM

## 2018-07-23 DIAGNOSIS — Z12.5 SPECIAL SCREENING FOR MALIGNANT NEOPLASM OF PROSTATE: ICD-10-CM

## 2018-07-23 DIAGNOSIS — Z13.6 SCREENING FOR ISCHEMIC HEART DISEASE: ICD-10-CM

## 2018-07-23 NOTE — PATIENT INSTRUCTIONS
Medicare Wellness Visit, Male    The best way to live healthy is to have a lifestyle where you eat a well-balanced diet, exercise regularly, limit alcohol use, and quit all forms of tobacco/nicotine, if applicable. Regular preventive services are another way to keep healthy. Preventive services (vaccines, screening tests, monitoring & exams) can help personalize your care plan, which helps you manage your own care. Screening tests can find health problems at the earliest stages, when they are easiest to treat. [facility] follows the current, evidence-based guidelines published by the [facility] (RUSTSTF) when recommending preventive services for our patients. Because we follow these guidelines, sometimes recommendations change over time as research supports it. (For example, a prostate screening blood test is no longer routinely recommended for men with no symptoms.)    Of course, you and your provider may decide to screen more often for some diseases, based on your risk and co-morbidities (chronic disease you are already diagnosed with). Preventive services for you include:    - Medicare offers their members a free annual wellness visit, which is time for you and your primary care provider to discuss and plan for your preventive service needs. Take advantage of this benefit every year!    -All people over age 72 should receive the recommended pneumonia vaccines. Current USPSTF guidelines recommend a series of two vaccines for the best pneumonia protection.     -All adults should have a yearly flu vaccine and a tetanus vaccine every 10 years.  All adults age 61 years should receive a shingles vaccine once in their lifetime.      -All adults age 38-68 years who are overweight should have a diabetes screening test once every three years.     -Other screening tests & preventive services for persons with diabetes include: an eye exam to screen for diabetic retinopathy, a kidney function test, a foot exam, and stricter control over your cholesterol.     -Cardiovascular screening for adults with routine risk involves an electrocardiogram (ECG) at intervals determined by the provider.     -Colorectal cancer screenings should be done for adults age 54-65 years with normal risk. There are a number of acceptable methods of screening for this type of cancer. Each test has its own benefits and drawbacks. Discuss with your provider what is most appropriate for you during your annual wellness visit. The different tests include: colonoscopy (considered the best screening method), a fecal occult blood test, a fecal DNA test, and sigmoidoscopy.    -All adults born between [facility] should be screened once for Hepatitis C.    -An Abdominal Aortic Aneurysm (AAA) Screening is recommended for men age 73-68 who has ever smoked in their lifetime. Here is a list of your current Health Maintenance items (your personalized list of preventive services) with a due date:  Health Maintenance Due   Topic Date Due    Stool testing for trace blood  05/03/2000    Annual Well Visit  07/04/2018    Glaucoma Screening   09/14/2018       Medicare Wellness Visit, Male    The best way to live healthy is to have a lifestyle where you eat a well-balanced diet, exercise regularly, limit alcohol use, and quit all forms of tobacco/nicotine, if applicable. Regular preventive services are another way to keep healthy. Preventive services (vaccines, screening tests, monitoring & exams) can help personalize your care plan, which helps you manage your own care. Screening tests can find health problems at the earliest stages, when they are easiest to treat. [address] [facility] follows the current, evidence-based guidelines published by the [facility] Guicho Heber ([facility]) when recommending preventive services for our patients.  Because we follow these guidelines, sometimes recommendations change over time as research supports it. (For example, a prostate screening blood test is no longer routinely recommended for men with no symptoms.)    Of course, you and your provider may decide to screen more often for some diseases, based on your risk and co-morbidities (chronic disease you are already diagnosed with). Preventive services for you include:    - Medicare offers their members a free annual wellness visit, which is time for you and your primary care provider to discuss and plan for your preventive service needs. Take advantage of this benefit every year!    -All people over age 72 should receive the recommended pneumonia vaccines. Current USPSTF guidelines recommend a series of two vaccines for the best pneumonia protection.     -All adults should have a yearly flu vaccine and a tetanus vaccine every 10 years. All adults age 61 years should receive a shingles vaccine once in their lifetime.      -All adults age 38-68 years who are overweight should have a diabetes screening test once every three years.     -Other screening tests & preventive services for persons with diabetes include: an eye exam to screen for diabetic retinopathy, a kidney function test, a foot exam, and stricter control over your cholesterol.     -Cardiovascular screening for adults with routine risk involves an electrocardiogram (ECG) at intervals determined by the provider.     -Colorectal cancer screenings should be done for adults age 54-65 years with normal risk. There are a number of acceptable methods of screening for this type of cancer. Each test has its own benefits and drawbacks. Discuss with your provider what is most appropriate for you during your annual wellness visit.  The different tests include: colonoscopy (considered the best screening method), a fecal occult blood test, a fecal DNA test, and sigmoidoscopy.    -All adults born between [facility] should be screened once for Hepatitis C.    -An Abdominal Aortic Aneurysm (AAA) Screening is recommended for men age 73-68 who has ever smoked in their lifetime.      Here is a list of your current Health Maintenance items (your personalized list of preventive services) with a due date:  Health Maintenance Due   Topic Date Due    Stool testing for trace blood  05/03/2000    Annual Well Visit  07/04/2018    Glaucoma Screening   09/14/2018

## 2018-07-23 NOTE — ACP (ADVANCE CARE PLANNING)
Advance Care Planning (ACP) Provider Conversation Snapshot    Date of ACP Conversation: 07/23/18  Persons included in Conversation:  patient  Length of ACP Conversation in minutes:  <16 minutes (Non-Billable)    Authorized Decision Maker (if patient is incapable of making informed decisions): This person is:   Healthcare Agent/Medical Power of  under Advance Directive          For Patients with Decision Making Capacity:   Values/Goals: Exploration of values, goals, and preferences if recovery is not expected, even with continued medical treatment in the event of:  Imminent death  Severe, permanent brain injury  \"In these circumstances, what matters most to you? \"  Care focused more on comfort or quality of life.     Conversation Outcomes / Follow-Up Plan:   Recommended completion of Advance Directive form after review of ACP materials and conversation with prospective healthcare agent

## 2018-07-23 NOTE — MR AVS SNAPSHOT
[address] [place], [place] [address]Andres Pinto [address] 
[phone] Patient: Konrad Song. MRN: [id number] DWL:[birth year] Visit Information Date & Time Provider Department Dept. Phone Encounter #  
 7/23/2018  3:00 PM Tracee Bob MD [facility] Medicine and [facility] 34 [id number] Follow-up Instructions Return if symptoms worsen or fail to improve. Follow-up and Disposition History Upcoming Health Maintenance Date Due FOBT Q 1 YEAR AGE 50-75 5/3/2000 MEDICARE YEARLY EXAM 7/4/2018 GLAUCOMA SCREENING Q2Y 9/14/2018 Pneumococcal 65+ Low/Medium Risk (2 of 2 - PPSV23) 6/8/2019* Influenza Age 5 to Adult 8/1/2018 DTaP/Tdap/Td series (2 - Td) 3/1/2027 *Topic was postponed. The date shown is not the original due date. Allergies as of 7/23/2018  Review Complete On: 7/23/2018 By: Tracee Bob MD  
 No Known Allergies Current Immunizations  Never Reviewed Name Date Influenza High Dose Vaccine PF 1/5/2017 Pneumococcal Conjugate (PCV-13) 3/1/2017 Tdap 3/1/2017 Zoster Vaccine, Live 5/10/2016 Not reviewed this visit You Were Diagnosed With   
  
 Codes Comments Medicare annual wellness visit, subsequent    -  Primary ICD-10-CM: Z00.00 ICD-9-CM: V70.0 Screening for alcoholism     ICD-10-CM: Z13.89 ICD-9-CM: V79.1 Screening for depression     ICD-10-CM: Z13.89 ICD-9-CM: V79.0 Screen for colon cancer     ICD-10-CM: Z12.11 ICD-9-CM: V76.51 Screening for ischemic heart disease     ICD-10-CM: Z13.6 ICD-9-CM: V81.0 Special screening for malignant neoplasm of prostate     ICD-10-CM: Z12.5 ICD-9-CM: V76.44 Nasal congestion     ICD-10-CM: R09.81 ICD-9-CM: 478.19 Vitals BP Pulse Temp Resp Height(growth percentile) Weight(growth percentile)  146/77 (BP 1 Location: Right arm, BP Patient Position: Sitting) 71 98.4 °F (36.9 °C) (Oral) 16 5' 9\" (1.753 m) 167 lb 14.4 oz (76.2 kg) SpO2 BMI Smoking Status 98% 24.79 kg/m2 Current Some Day Smoker Vitals History BMI and BSA Data Body Mass Index Body Surface Area 24.79 kg/m 2 1.93 m 2 Preferred Pharmacy Pharmacy Name Phone [address], [address]  [phone] Your Updated Medication List  
  
   
This list is accurate as of 7/23/18  4:02 PM.  Always use your most recent med list.  
  
  
  
  
 Cholecalciferol (Vitamin D3) 2,000 unit Cap capsule Commonly known as:  VITAMIN D3 Take  by mouth two (2) times a day. FISH OIL 1,000 mg Cap Generic drug:  omega-3 fatty acids-vitamin e Take 1 Cap by mouth. FLONASE 50 mcg/actuation nasal spray Generic drug:  fluticasone 2 Sprays by Both Nostrils route daily. glucosamine-chondroitin 1,500-1,200 mg/30 mL Liqd Commonly known as:  ELATION Take  by mouth. JUBLIA Nettie topical solution Generic drug:  efinaconazole Apply  to affected area daily. rosuvastatin 5 mg tablet Commonly known as:  CRESTOR Take 1 Tab by mouth nightly. TRAVATAN Z 0.004 % ophthalmic solution Generic drug:  travoprost  
INSTILL 1 DROP INTO BOTH EYES IN THE EVENING  
  
 VITAMIN C 500 mg tablet Generic drug:  ascorbic acid (vitamin C) Take 1,000 mg by mouth. [facility] Per[facility] the [facility] [[id number] HCP] LIPID PANEL [76017 CPT(R)] OCCULT BLOOD, IMMUNOASSAY (FIT) [id number] CPT(R)] UT ANNUAL ALCOHOL SCREEN 15 MIN [id number] [facility]] UT PROSTATE CA SCREENING; CHRISTY [ [facility]] PSA SCREENING (SCREENING) [ [facility]] REFERRAL TO ENT-OTOLARYNGOLOGY [YMT96 Custom] Follow-up Instructions Return if symptoms worsen or fail to improve. Referral Information Referral ID Referred By Referred To [id number] Homer PEREZ E [facility] Kwadwo Alegria [address] [place] [address] Fax: [phone] Visits Status Start Date End Date 1 New Request 7/23/18 7/23/19 If your referral has a status of pending review or denied, additional information will be sent to support the outcome of this decision. Patient Instructions Medicare Wellness Visit, Male The best way to live healthy is to have a lifestyle where you eat a well-balanced diet, exercise regularly, limit alcohol use, and quit all forms of tobacco/nicotine, if applicable. Regular preventive services are another way to keep healthy. Preventive services (vaccines, screening tests, monitoring & exams) can help personalize your care plan, which helps you manage your own care. Screening tests can find health problems at the earliest stages, when they are easiest to treat. [facility] follows the current, evidence-based guidelines published by the [facility] Guicho Heber ([facility]) when recommending preventive services for our patients. Because we follow these guidelines, sometimes recommendations change over time as research supports it. (For example, a prostate screening blood test is no longer routinely recommended for men with no symptoms.) Of course, you and your provider may decide to screen more often for some diseases, based on your risk and co-morbidities (chronic disease you are already diagnosed with). Preventive services for you include: - Medicare offers their members a free annual wellness visit, which is time for you and your primary care provider to discuss and plan for your preventive service needs. Take advantage of this benefit every year! 
 
-All people over age 72 should receive the recommended pneumonia vaccines. Current USPSTF guidelines recommend a series of two vaccines for the best pneumonia protection. -All adults should have a yearly flu vaccine and a tetanus vaccine every 10 years. All adults age 61 years should receive a shingles vaccine once in their lifetime.   
 
-All adults age 38-68 years who are overweight should have a diabetes screening test once every three years.  
 
-Other screening tests & preventive services for persons with diabetes include: an eye exam to screen for diabetic retinopathy, a kidney function test, a foot exam, and stricter control over your cholesterol.  
 
-Cardiovascular screening for adults with routine risk involves an electrocardiogram (ECG) at intervals determined by the provider.  
 
-Colorectal cancer screenings should be done for adults age 54-65 years with normal risk. There are a number of acceptable methods of screening for this type of cancer. Each test has its own benefits and drawbacks. Discuss with your provider what is most appropriate for you during your annual wellness visit. The different tests include: colonoscopy (considered the best screening method), a fecal occult blood test, a fecal DNA test, and sigmoidoscopy. 
 
-All adults born between [facility] should be screened once for Hepatitis C. 
 
-An Abdominal Aortic Aneurysm (AAA) Screening is recommended for men age 73-68 who has ever smoked in their lifetime. Here is a list of your current Health Maintenance items (your personalized list of preventive services) with a due date: 
Health Maintenance Due Topic Date Due  Stool testing for trace blood  05/03/2000 Margarita Annual Well Visit  07/04/2018  Glaucoma Screening   09/14/2018 Medicare Wellness Visit, Male The best way to live healthy is to have a lifestyle where you eat a well-balanced diet, exercise regularly, limit alcohol use, and quit all forms of tobacco/nicotine, if applicable. Regular preventive services are another way to keep healthy.  Preventive services (vaccines, screening tests, monitoring & exams) can help personalize your care plan, which helps you manage your own care. Screening tests can find health problems at the earliest stages, when they are easiest to treat. [address] follows the current, evidence-based guidelines published by the [facility] Guicho Buck ([facility]) when recommending preventive services for our patients. Because we follow these guidelines, sometimes recommendations change over time as research supports it. (For example, a prostate screening blood test is no longer routinely recommended for men with no symptoms.) Of course, you and your provider may decide to screen more often for some diseases, based on your risk and co-morbidities (chronic disease you are already diagnosed with). Preventive services for you include: - Medicare offers their members a free annual wellness visit, which is time for you and your primary care provider to discuss and plan for your preventive service needs. Take advantage of this benefit every year! 
 
-All people over age 72 should receive the recommended pneumonia vaccines. Current USPSTF guidelines recommend a series of two vaccines for the best pneumonia protection.  
 
-All adults should have a yearly flu vaccine and a tetanus vaccine every 10 years.  All adults age 61 years should receive a shingles vaccine once in their lifetime.   
 
-All adults age 38-68 years who are overweight should have a diabetes screening test once every three years.  
 
-Other screening tests & preventive services for persons with diabetes include: an eye exam to screen for diabetic retinopathy, a kidney function test, a foot exam, and stricter control over your cholesterol.  
 
-Cardiovascular screening for adults with routine risk involves an electrocardiogram (ECG) at intervals determined by the provider.  
 
-Colorectal cancer screenings should be done for adults age 54-65 years with normal risk. There are a number of acceptable methods of screening for this type of cancer. Each test has its own benefits and drawbacks. Discuss with your provider what is most appropriate for you during your annual wellness visit. The different tests include: colonoscopy (considered the best screening method), a fecal occult blood test, a fecal DNA test, and sigmoidoscopy. 
 
-All adults born between [facility] should be screened once for Hepatitis C. 
 
-An Abdominal Aortic Aneurysm (AAA) Screening is recommended for men age 73-68 who has ever smoked in their lifetime. Here is a list of your current Health Maintenance items (your personalized list of preventive services) with a due date: 
Health Maintenance Due Topic Date Due  Stool testing for trace blood  05/03/2000 Guido Davis Annual Well Visit  07/04/2018  Glaucoma Screening   09/14/2018 Patient Instructions History Introducing [facility] & HEALTH SERVICES! Dear Bettie Pina: 
Thank you for requesting a Teedot account. Our records indicate that you already have an active Teedot account. You can access your account anytime at [email or web address] Did you know that you can access your hospital and ER discharge instructions at any time in Teedot? You can also review all of your test results from your hospital stay or ER visit. Additional Information If you have questions, please visit the Frequently Asked Questions section of the Teedot website at [email or web address] Remember, Teedot is NOT to be used for urgent needs. For medical emergencies, dial 911. Now available from your iPhone and Android! Please provide this summary of care documentation to your next provider. Your primary care clinician is listed as Jazzy Temple. If you have any questions after today's visit, please call [phone].

## 2018-07-23 NOTE — PROGRESS NOTES
This is the Subsequent Medicare Annual Wellness Exam, performed 12 months or more after the Initial AWV or the last Subsequent AWV    I have reviewed the patient's medical history in detail and updated the computerized patient record. History   History reviewed. No pertinent past medical history. History reviewed. No pertinent surgical history. Current Outpatient Prescriptions   Medication Sig Dispense Refill    TRAVATAN Z 0.004 % ophthalmic solution INSTILL 1 DROP INTO BOTH EYES IN THE EVENING  6    fluticasone (FLONASE) 50 mcg/actuation nasal spray 2 Sprays by Both Nostrils route daily.  Cholecalciferol, Vitamin D3, (VITAMIN D3) 2,000 unit cap capsule Take  by mouth two (2) times a day.  ascorbic acid, vitamin C, (VITAMIN C) 500 mg tablet Take 1,000 mg by mouth.  glucosamine-chondroitin (ELATION) 1,500-1,200 mg/30 mL liqd Take  by mouth.  rosuvastatin (CRESTOR) 5 mg tablet Take 1 Tab by mouth nightly. 30 Tab 3    efinaconazole (JUBLIA) erlinda topical solution Apply  to affected area daily.  omega-3 fatty acids-vitamin e (FISH OIL) 1,000 mg cap Take 1 Cap by mouth. No Known Allergies  History reviewed. No pertinent family history. Social History   Substance Use Topics    Smoking status: Current Some Day Smoker     Types: Cigars    Smokeless tobacco: Current User    Alcohol use 1.2 oz/week     2 Glasses of wine per week     Patient Active Problem List   Diagnosis Code    Hyperlipidemia E78.5    ACP (advance care planning) Z71.89       Depression Risk Factor Screening:     PHQ over the last two weeks 6/8/2018   Little interest or pleasure in doing things Not at all   Feeling down, depressed, irritable, or hopeless Not at all   Total Score PHQ 2 0     Alcohol Risk Factor Screening: On any occasion in the past three months you have had more than 7 drinks containing alcohol    Functional Ability and Level of Safety:   Hearing Loss  Hearing is good.  The patient needs further evaluation. Activities of Daily Living  The home contains: no safety equipment. Patient does total self care    Fall Risk  Fall Risk Assessment, last 12 mths 6/8/2018   Able to walk? Yes   Fall in past 12 months? No       Abuse Screen  Patient is not abused    Cognitive Screening   Evaluation of Cognitive Function:  Has your family/caregiver stated any concerns about your memory: no  Normal    Patient Care Team   Patient Care Team:  Merrill Tyler MD as PCP - General (Family Practice)    Assessment/Plan   Education and counseling provided:  Are appropriate based on today's review and evaluation    Diagnoses and all orders for this visit:    1. Medicare annual wellness visit, subsequent    2. Screening for alcoholism  -     Annual  Alcohol Screen 15 min ()    3. Screening for depression  -     Depression Screen Annual    4. Screen for colon cancer  -     OCCULT BLOOD, IMMUNOASSAY (FIT)    5. Screening for ischemic heart disease  -     Lipid Panel ([id number])    6. Special screening for malignant neoplasm of prostate  -     Digital Rectal Exam ()  -     PSA Screening ([id number])    7.  Nasal congestion  -     REFERRAL TO ENT-OTOLARYNGOLOGY        Health Maintenance Due   Topic Date Due    FOBT Q 1 YEAR AGE 50-75  05/03/2000    MEDICARE YEARLY EXAM  07/04/2018    GLAUCOMA SCREENING Q2Y  09/14/2018

## 2018-07-25 ENCOUNTER — OFFICE VISIT (OUTPATIENT)
Dept: INTERNAL MEDICINE CLINIC | Age: 68
End: 2018-07-25

## 2018-07-25 VITALS
OXYGEN SATURATION: 98 % | HEART RATE: 65 BPM | TEMPERATURE: 98.1 F | RESPIRATION RATE: 16 BRPM | BODY MASS INDEX: 24.26 KG/M2 | WEIGHT: 163.8 LBS | DIASTOLIC BLOOD PRESSURE: 75 MMHG | HEIGHT: 69 IN | SYSTOLIC BLOOD PRESSURE: 120 MMHG

## 2018-07-25 DIAGNOSIS — M25.571 CHRONIC PAIN OF RIGHT ANKLE: Primary | ICD-10-CM

## 2018-07-25 DIAGNOSIS — G89.29 CHRONIC PAIN OF RIGHT ANKLE: Primary | ICD-10-CM

## 2018-07-25 DIAGNOSIS — Z01.818 PRE-OP EVALUATION: ICD-10-CM

## 2018-07-25 NOTE — PROGRESS NOTES
Chief Complaint   Patient presents with    Pre-op Exam     he is a 76y.o. year old male who presents for evaluation of pre op exam  Preoperative Evaluation    Date of Exam: 7/25/2018    Louis Silveira Sr. is a 76 y.o. male who presents for preoperative evaluation. Procedure/Surgery: Ankle surgery   Date of Procedure/Surgery: 8/6/18   Surgeon: Dr. John Geiger: [address] [place] [address]  Primary Physician: Sadia Cardenas Allergy: no    Problem List:     Patient Active Problem List    Diagnosis Date Noted    ACP (advance care planning) 07/03/2017    Hyperlipidemia 01/19/2017     Medical History:   History reviewed. No pertinent past medical history. Allergies:   No Known Allergies   Medications:     Current Outpatient Prescriptions   Medication Sig    TRAVATAN Z 0.004 % ophthalmic solution INSTILL 1 DROP INTO BOTH EYES IN THE EVENING    fluticasone (FLONASE) 50 mcg/actuation nasal spray 2 Sprays by Both Nostrils route daily.  efinaconazole (JUBLIA) erlinda topical solution Apply  to affected area daily.  Cholecalciferol, Vitamin D3, (VITAMIN D3) 2,000 unit cap capsule Take  by mouth two (2) times a day.  omega-3 fatty acids-vitamin e (FISH OIL) 1,000 mg cap Take 1 Cap by mouth.  ascorbic acid, vitamin C, (VITAMIN C) 500 mg tablet Take 1,000 mg by mouth.  glucosamine-chondroitin (ELATION) 1,500-1,200 mg/30 mL liqd Take  by mouth. No current facility-administered medications for this visit. Surgical History:   History reviewed. No pertinent surgical history.   Social History:     Social History     Social History    Marital status:      Spouse name: N/A    Number of children: N/A    Years of education: N/A     Social History Main Topics    Smoking status: Current Some Day Smoker     Types: Cigars    Smokeless tobacco: Current User    Alcohol use 1.2 oz/week     2 Glasses of wine per week    Drug use: No    Sexual activity: Yes     Other Topics Concern    None     Social History Narrative       Recent use of: No recent use of aspirin (ASA), NSAIDS or steroids    Tetanus up to date: last tetanus booster within 10 years      Anesthesia Complications: None  History of abnormal bleeding : None  History of Blood Transfusions: no  Health Care Directive or Living Will: no      History reviewed. No pertinent surgical history. History reviewed. No pertinent family history. History reviewed. No pertinent past medical history. Social History     Social History    Marital status:      Spouse name: N/A    Number of children: N/A    Years of education: N/A     Social History Main Topics    Smoking status: Current Some Day Smoker     Types: Cigars    Smokeless tobacco: Current User    Alcohol use 1.2 oz/week     2 Glasses of wine per week    Drug use: No    Sexual activity: Yes     Other Topics Concern    None     Social History Narrative      Reviewed and agree with Nurse Note and duplicated in this note. Reviewed PmHx, RxHx, FmHx, SocHx, AllgHx and updated and dated in the chart.      Review of Systems - negative except as listed above      Objective:     Vitals:    07/25/18 0950   BP: 120/75   Pulse: 65   Resp: 16   Temp: 98.1 °F (36.7 °C)   TempSrc: Oral   SpO2: 98%   Weight: 163 lb 12.8 oz (74.3 kg)   Height: 5' 9\" (1.753 m)       Physical Examination: General appearance - alert, well appearing, and in no distress  Eyes - pupils equal and reactive, extraocular eye movements intact  Ears - bilateral TM's and external ear canals normal  Nose - normal and patent, no erythema, discharge or polyps  Mouth - mucous membranes moist, pharynx normal without lesions  Neck - supple, no significant adenopathy  Chest - clear to auscultation, no wheezes, rales or rhonchi, symmetric air entry  Heart - normal rate, regular rhythm, normal S1, S2, no murmurs, rubs, clicks or gallops  Abdomen - soft, nontender, nondistended, no masses or organomegaly  Neurological - alert, oriented, normal speech, no focal findings or movement disorder noted  Musculoskeletal - no joint tenderness, deformity or swelling  Extremities - peripheral pulses normal, no pedal edema, no clubbing or cyanosis  Skin - normal coloration and turgor, no rashes, no suspicious skin lesions noted    Assessment/ Plan:   Diagnoses and all orders for this visit:    1. Chronic pain of right ankle    2. Pre-op evaluation  -     AMB POC EKG ROUTINE W/ 12 LEADS, INTER & REP    Blood work from physical pending  We will obtain physical form from his orthopedic doctor  Follow-up Disposition: Not on File      1) Remember to stay active and/or exercise regularly (I suggest 30-45 minutes daily)   2) For reliable dietary information, go to www. EATRIGHT.org. You may wish to consider seeing the nutritionist at [facility] [phone], also consider the [address] 3) I routinely suggest a complete physical exam once each year (your birth month)  I have discussed the diagnosis with the patient and the intended plan as seen in the above orders. The patient has received an after-visit summary and questions were answered concerning future plans. Medication Side Effects and Warnings were discussed with patient: yes  Patient Labs were reviewed and or requested: yes  Patient Past Records were reviewed and or requested  yes  I have discussed the diagnosis with the patient and the intended plan as seen in the above orders. Pt agrees to call or return to clinic and/or go to closest ER with any worsening of symptoms. This may include, but not limited to increased fever (>100.4) with NSAIDS or Tylenol, increased edema, confusion, rash, worsening of presenting symptoms.

## 2018-07-25 NOTE — MR AVS SNAPSHOT
[address] [place] [address]. Millie [id number] 
[phone] Patient: Vicenta Mota. MRN: [id number] ZON:[birth year] Visit Information Date & Time Provider Department Dept. Phone Encounter #  
 7/25/2018 10:00 AM Day Pimentel MD [place] [facility] [facility] [id number] Follow-up Instructions Return if symptoms worsen or fail to improve. Follow-up and Disposition History Upcoming Health Maintenance Date Due FOBT Q 1 YEAR AGE 50-75 5/3/2000 GLAUCOMA SCREENING Q2Y 9/14/2018 Pneumococcal 65+ Low/Medium Risk (2 of 2 - PPSV23) 6/8/2019* Influenza Age 5 to Adult 8/1/2018 MEDICARE YEARLY EXAM 7/24/2019 DTaP/Tdap/Td series (2 - Td) 3/1/2027 *Topic was postponed. The date shown is not the original due date. Allergies as of 7/25/2018  Review Complete On: 7/25/2018 By: Day Pimentel MD  
 No Known Allergies Current Immunizations  Never Reviewed Name Date Influenza High Dose Vaccine PF 1/5/2017 Pneumococcal Conjugate (PCV-13) 3/1/2017 Tdap 3/1/2017 Zoster Vaccine, Live 5/10/2016 Not reviewed this visit You Were Diagnosed With   
  
 Codes Comments Chronic pain of right ankle    -  Primary ICD-10-CM: M25.571, G89.29 ICD-9-CM: 719.47, 338.29 Pre-op evaluation     ICD-10-CM: D45.873 ICD-9-CM: V72.84 Vitals BP Pulse Temp Resp Height(growth percentile) Weight(growth percentile) 120/75 (BP 1 Location: Right arm, BP Patient Position: Sitting) 65 98.1 °F (36.7 °C) (Oral) 16 5' 9\" (1.753 m) 163 lb 12.8 oz (74.3 kg) SpO2 BMI Smoking Status 98% 24.19 kg/m2 Current Some Day Smoker Vitals History BMI and BSA Data Body Mass Index Body Surface Area  
 24.19 kg/m 2 1.9 m 2 Preferred Pharmacy Pharmacy Name Phone  [facility] [address] [place] [address] [phone] Your Updated Medication List  
  
   
This list is accurate as of 7/25/18 10:10 AM.  Always use your most recent med list.  
  
  
  
  
 Cholecalciferol (Vitamin D3) 2,000 unit Cap capsule Commonly known as:  VITAMIN D3 Take  by mouth two (2) times a day. FISH OIL 1,000 mg Cap Generic drug:  omega-3 fatty acids-vitamin e Take 1 Cap by mouth. FLONASE 50 mcg/actuation nasal spray Generic drug:  fluticasone 2 Sprays by Both Nostrils route daily. glucosamine-chondroitin 1,500-1,200 mg/30 mL Liqd Commonly known as:  ELATION Take  by mouth. JUBLIA Nettie topical solution Generic drug:  efinaconazole Apply  to affected area daily. TRAVATAN Z 0.004 % ophthalmic solution Generic drug:  travoprost  
INSTILL 1 DROP INTO BOTH EYES IN THE EVENING  
  
 VITAMIN C 500 mg tablet Generic drug:  ascorbic acid (vitamin C) Take 1,000 mg by mouth. We Performed the Following AMB POC EKG ROUTINE W/ 12 LEADS, INTER & REP [86458 CPT(R)] Follow-up Instructions Return if symptoms worsen or fail to improve. Introducing [facility]! Dear Francisco Cm: 
Thank you for requesting a [employer] account. Our records indicate that you already have an active [employer]aterna account. You can access your account anytime at [email or web address] Did you know that you can access your hospital and ER discharge instructions at any time in [employer]na? You can also review all of your test results from your hospital stay or ER visit. Additional Information If you have questions, please visit the Frequently Asked Questions section of the [employer]na website at [email or web address] Remember, [employer]na is NOT to be used for urgent needs. For medical emergencies, dial 911. Now available from your iPhone and Android! Please provide this summary of care documentation to your next provider. Your primary care clinician is listed as [address] [place] [address] . If you have any questions after today's visit, please call [phone].

## 2018-07-26 LAB
CHOLEST SERPL-MCNC: 263 MG/DL (ref 100–199)
HDLC SERPL-MCNC: 75 MG/DL
LDLC SERPL CALC-MCNC: 172 MG/DL (ref 0–99)
PSA SERPL-MCNC: 0.8 NG/ML (ref 0–4)
TRIGL SERPL-MCNC: 80 MG/DL (ref 0–149)
VLDLC SERPL CALC-MCNC: 16 MG/DL (ref 5–40)

## 2018-08-02 LAB — HEMOCCULT STL QL IA: NEGATIVE

## 2018-10-22 ENCOUNTER — HOSPITAL ENCOUNTER (OUTPATIENT)
Dept: PHYSICAL THERAPY | Age: 68
Discharge: HOME | End: 2018-10-22
Payer: MEDICARE

## 2018-10-22 PROCEDURE — G8979 MOBILITY GOAL STATUS: HCPCS

## 2018-10-22 PROCEDURE — 97110 THERAPEUTIC EXERCISES: CPT

## 2018-10-22 PROCEDURE — G8978 MOBILITY CURRENT STATUS: HCPCS

## 2018-10-22 PROCEDURE — 97161 PT EVAL LOW COMPLEX 20 MIN: CPT

## 2018-10-22 NOTE — PROGRESS NOTES
PT INITIAL EVALUATION NOTE - [facility] 2-15 Patient Name: Hector Dumont 
SLPT: : 1950 [x]  Patient  Verified Payor: [place] MED[place]ARE / Plan: Gianluca Amador / Product Type: Medicare / In time:10:22 am  Out time:11:04 am 
Total Treatment Time (min): 42 minutes Total Timed Codes (min): 10 minutes 1:1 Treatment Time ( only): 42 minutes Visit #: 1 Treatment Area: Right ankle pain [M25.571] SUBJECTIVE Pain Level (0-10 scale): 2/10 Any medication changes, allergies to medications, adverse drug reactions, diagnosis change, or new procedure performed?: [] No    [x] Yes (see summary sheet for update) Subjective: Pt was referred to skilled PT for chronic right ankle pain. Pt is S/P arthroscopic debridement of R ankle on 18 secondary to osteochondral lesion. Pt was NWB with crutches for first 4 weeks post-surgery; Pt was then [facility] with crutches for the next 4 weeks. Pt has been WBAT with boot and no crutches for past 2 weeks. Pt reports primary complaints of soreness in R anterior ankle, primarily late in the day>morning. He also notes some muscle soreness in lateral and posterior R calf. Pt denies any numbness/tingling. He still notes mild swelling in L foot/ankle, though much improved since surgery. PLOF: Competitive marksmanship, scuba diving, photography for events. Previous Treatment/Compliance: None for ankle; Previous PT for chronic LBP with good compliance  Work Hx: ; air craft . Living Situation: Pt lives alone in a home. PMHx/Surgical Hx: No prior surgeries; no co-morbidities. Pt Goals: Pt wants to get his normal back, do yard work, biking, get back to physical activity OBJECTIVE/EXAMINATION Posture:  Normal 
Other Observations:  R calf atrophy Gait and Functional Mobility:  Wears boot without assistive device Palpation: Mild tenderness of right anteromedial talocrural joint, R peroneal musculature just proximal to lateral malleolus R SLS: 4\" L SLS: Normal 
 
Heel raise: p! Anteromedial R talocrural joint Toe raise: Normal 
 
 
R Knee AROM WNL L Knee AROM WNL R ankle ROM:  AROM   PROM Flexion /DF  0   +3 Extension/PF  33   40 IR/Sup/Ev     5 
 ER/Pron/Inv     20 L ankle ROM:   AROM   PROM Flexion /DF  +2   +6 Extension/PF  55 IR/Sup/Ev     10 ER/Pron/Inv     20 Joint Mobility Assessment: Decreased A-P>P-A mobility of talocrural joint; Decreased eversion mobility of subtalar joint Flexibility: Decreased flexibility of gastroc-soleus complex LOWER QUARTER   MUSCLE STRENGTH 
KEY       R  L 
0 - No Contraction  Knee ext  5  5 
1 - Trace            flex  5  5 
2 - Poor   Hip ext   3+  5 
3 - Fair          flex   4  5 
4 - Good         abd  4  5 
5 - Normal         add  NT  NT Ankle DF  5  5 
              PF  5  5 INV  5  5 EV  5  5 Neurological: Reflexes / Sensations: Light touch sensation intact bilaterally L2-S1 Special Tests:    
    Corin Borne: (+) R     
               H.S. SLR: (-) 10 min Therapeutic Exercise:  [x] See flow sheet :  
Rationale: increase ROM, increase strength, improve balance and increase proprioception to improve the patients ability to perform functional activities with decreased pain. With 
 [x] TE 
 [] TA 
 [] neuro [x] other: Patient Education: [x] Review HEP [] Progressed/Changed HEP based on:  
[] positioning   [] body mechanics   [] transfers   [] heat/ice application   
[x] other: Educated Pt regarding impairments, role of PT, and POC. Other Objective/Functional Measures: FOTO Score: 46/100 Pain Level (0-10 scale) post treatment: 2/10 ASSESSMENT/Changes in Function:  
 
[x]  See Plan of Care [facility] 10/22/2018  12:21 PM

## 2018-10-25 ENCOUNTER — HOSPITAL ENCOUNTER (OUTPATIENT)
Dept: PHYSICAL THERAPY | Age: 68
Discharge: HOME | End: 2018-10-25
Payer: MEDICARE

## 2018-10-25 PROCEDURE — 97110 THERAPEUTIC EXERCISES: CPT

## 2018-10-25 PROCEDURE — 97112 NEUROMUSCULAR REEDUCATION: CPT

## 2018-10-25 PROCEDURE — 97140 MANUAL THERAPY 1/> REGIONS: CPT

## 2018-10-25 PROCEDURE — 97016 VASOPNEUMATIC DEVICE THERAPY: CPT

## 2018-10-25 NOTE — PROGRESS NOTES
PT DAILY TREATMENT NOTE - [facility] 2-15 Patient Name: Brooks Xiong. 
GTFQ: : 1950 [x]  Patient  Verified Payor: [place] MEDICARE / Plan: Gianluca Amador / Product Type: Medicare / In time:12:03 pm  Out time:1:12 pm 
Total Treatment Time (min): 69 minutes Total Timed Codes (min): 55 minutes 1:1 Treatment Time ( only): 42 minutes Visit #: 2 Treatment Area: Right ankle pain [M25.571] SUBJECTIVE Pain Level (0-10 scale): 3/10 Any medication changes, allergies to medications, adverse drug reactions, diagnosis change, or new procedure performed?: [x] No    [] Yes (see summary sheet for update) Subjective functional status/changes:   [] No changes reported Pt notices soreness in the mornings that improves with movement throughout the day. He has been experiencing some pain in front of the ankle. He has noticed tenderness putting weight down through ball of foot while descending stairs.  
  
OBJECTIVE 
  
      
Modality rationale: decrease inflammation, decrease pain and increase tissue extensibility to improve the patients ability to ambulate with decreased pain or discomfor Type Additional Details   
[] Estim: []Att   []Unatt    []TENS instruct []IFC  []Premod   []NMES []Other:  []w/US   []w/ice   []w/heat Position: Location:   
[]  Traction: [] Cervical       []Lumbar 
                     [] Prone          []Supine []Intermittent   []Continuous Lbs: 
[] before manual 
[] after manual 
[]w/heat   
[]  Ultrasound: []Continuous   [] Pulsed  
                    at: []1MHz   []3MHz Location: 
W/cm2:   
[] Paraffin Location:  
[]w/heat   
[]  Ice     []  Heat 
[]  Ice massage Position: Location:   
[]  Laser 
[]  Other: Position: Location:   
[x]  Vasopneumatic Device   Medium 34 degrees (15 min)  
  
[x] Skin assessment post-treatment:  [x]intact []redness- no adverse reaction []redness  adverse reaction:  
  
33 min Therapeutic Exercise:  [x] See flow sheet :  
Rationale: increase ROM, increase strength, improve balance and increase proprioception to improve the patients ability to ambulate with decreased pain.  
  
12 min Neuromuscular Re-education:  [x]  See flow sheet : SLS; Rockerboard Rationale: improve balance and increase proprioception  to improve the patients ability to decrease risk of future injury. 
  
10 min Manual Therapy: A-P glides of R talocrural joint; Posterior distal fibular glides; Rearfoot eversion glides Rationale: decrease pain, increase ROM and increase tissue extensibility to improve the patients ability to ambulate with decreased pain. With 
 [x] TE 
 [] TA 
 [] neuro 
 [] other: Patient Education: [x] Review HEP [] Progressed/Changed HEP based on:  
[] positioning   [] body mechanics   [] transfers   [] heat/ice application   
[] other:   
  
Other Objective/Functional Measures:       
  
CKC DF: 35 
  
Pain Level (0-10 scale) post treatment: 0/10 
  
ASSESSMENT/Changes in Function:  
Introduced half-kneel self-mobilizations to decrease restriction in anterior talocrural joint and improve mobility. Also initiated rockerboard to promote increased proprioceptive input and standing balance. Heel raises were performed with cues to limit motion to non-painful ROM and eccentric control. Patient will continue to benefit from skilled PT services to modify and progress therapeutic interventions, address functional mobility deficits, address ROM deficits, address strength deficits, analyze and address soft tissue restrictions, analyze and cue movement patterns, analyze and modify body mechanics/ergonomics and address imbalance/dizziness to attain remaining goals. [x]  See Plan of Care 
[]  See progress note/recertification 
[]  See Discharge Summary Progress towards goals / Updated goals: 
Short Term Goals: To be accomplished in 6 treatments: 
            1. Pt will be independent and compliant with HEP.             2. Pt will improve R SL balance from 4 seconds on non-compliant surface to >/= 20 seconds on non-compliant surface demonstrating improved proprioceptive input and minimize risk of future injury.             3. Pt will be able to ambulate >/= 1/2 mile without boot on level surfaces without complaints of increased R ankle pain. Long Term Goals: To be accomplished in 12 treatments: 
            1. Pt will be independent and compliant with HEP.             2. Pt will improve FOTO score by the MCID from 46/100 to 60/100 demonstrating improved overall function with decreased pain and discomfort. 
            3. Pt will improve R SL balance to >/= 30 seconds on compliant surface demonstrating improved proprioceptive input and minimize risk of future injury.             4. Pt will return to scuba diving without restriction or complaints of pain. 
            5. Pt will return to biking outside without complaints of pain or discomfort in R foot/ankle. 
            6. Pt will be able to perform >/= 10 R SL heel raises on the stairs demonstrating increased R calf strength. 
            7. Pt will improve DF CKC AROM by >/= 10 degrees facilitating improved gait mechanics.             8. Pt will be able to jog for >/= 1/2 mile without complaints of increased R ankle pain. 
  
  
  
PLAN [x]  Upgrade activities as tolerated     [x]  Continue plan of care [x]  Update interventions per flow sheet      
[]  Discharge due to:_ 
[]  Other:_ Teresa Antonio 10/25/2018  2:18 PM

## 2018-10-29 ENCOUNTER — HOSPITAL ENCOUNTER (OUTPATIENT)
Dept: PHYSICAL THERAPY | Age: 68
Discharge: HOME | End: 2018-10-29
Payer: MEDICARE

## 2018-10-29 PROCEDURE — 97110 THERAPEUTIC EXERCISES: CPT

## 2018-10-29 PROCEDURE — 97016 VASOPNEUMATIC DEVICE THERAPY: CPT

## 2018-10-29 PROCEDURE — 97112 NEUROMUSCULAR REEDUCATION: CPT

## 2018-10-29 PROCEDURE — 97140 MANUAL THERAPY 1/> REGIONS: CPT

## 2018-10-29 NOTE — PROGRESS NOTES
PT DAILY TREATMENT NOTE - [facility] 2-15 Patient Name: Joelle Carlos 
IHSR: : 1950 [x]  Patient  Verified Payor: [place] [employer] / Plan: Gianluca Amador / Product Type: Medicare / In time:12:30 pm  Out time:1:50 pm 
Total Treatment Time (min): 80 minutes Total Timed Codes (min): 65 minutes 1:1 Treatment Time ( only): 57 minutes Visit#: 3 Treatment Area: right ankle pain 
 
  
SUBJECTIVE Pain Level (0-10 scale): 1/10 Any medication changes, allergies to medications, adverse drug reactions, diagnosis change, or new procedure performed?: [x] No    [] Yes (see summary sheet for update) Subjective functional status/changes:   [] No changes reported Pt raked leaves and did some hedge trimming while wearing his boot for 1 hour yesterday; he was able to tolerate it without increased pain.   
  
OBJECTIVE 
  
           
Modality rationale: decrease inflammation, decrease pain and increase tissue extensibility to improve the patients ability to ambulate with decreased pain or discomfor Type Additional Details    
[] Estim: []Att   []Unatt    []TENS instruct 
                []IFC  []Premod   []NMES   
                 []Other:  []w/US   []w/ice   []w/heat Position: Location:    
[]  Traction: [] Cervical       []Lumbar 
                     [] Prone          []Supine 
                     []Intermittent   []Continuous Lbs: 
[] before manual 
[] after manual 
[]w/heat    
[]  Ultrasound: []Continuous   [] Pulsed  
                    EF: []4QNP   []3MHz Location: 
W/cm2:    
[] Paraffin  
  Location:  
[]w/heat    
[]  Ice     []  Heat 
[]  Ice massage Position: Location:    
[]  Laser 
[]  Other: Position: Location:    
[x]  Vasopneumatic Device     Medium 34 degrees (15 min)  
  
[x] Skin assessment post-treatment:  [x]intact []redness- no adverse reaction 
  []redness  adverse reaction:  
  
38 min Therapeutic Exercise:  [x] See flow sheet :  
 Rationale: increase ROM, increase strength, improve balance and increase proprioception to improve the patients ability to ambulate with decreased pain.  
  
14 min Neuromuscular Re-education:  [x]  See flow sheet : SLS; Rockerboard Rationale: improve balance and increase proprioception  to improve the patients ability to decrease risk of future injury.   
13 min Manual Therapy: A-P glides of R talocrural joint; Posterior distal fibular glides; Rearfoot eversion glides Rationale: decrease pain, increase ROM and increase tissue extensibility to improve the patients ability to ambulate with decreased pain. 
  
With 
 [x] TE 
 [] TA 
 [] neuro 
 [] other: Patient Education: [x] Review HEP   
[] Progressed/Changed HEP based on:  
[] positioning   [] body mechanics   [] transfers   [] heat/ice application   
[] other:   
  
Other Objective/Functional Measures:       
  
CKC DF: 35  
  
Pain Level (0-10 scale) post treatment: 0/10 
  
ASSESSMENT/Changes in Function:  
Initiated squats on total gym to facilitate increased ankle mobility while strengthening hips/lower extremities; cued for maintaining knee abduction, pushing down through heels, and eccentric control. Balance training consisted of tandem stance with RLE posterior on compliant surface for increased proprioceptive input; cued Pt for maintaining contact of medial foot on ground. Also introduced clocks for improved proprioception and hip strength/stability.  
Patient will continue to benefit from skilled PT services to modify and progress therapeutic interventions, address functional mobility deficits, address ROM deficits, address strength deficits, analyze and address soft tissue restrictions, analyze and cue movement patterns, analyze and modify body mechanics/ergonomics and address imbalance/dizziness to attain remaining goals. 
  
[x]  See Plan of Care 
[]  See progress note/recertification 
[]  See Discharge Summary 
  
 Progress towards goals / Updated goals: 
Short Term Goals: To be accomplished in 6 treatments: 
            1. Pt will be independent and compliant with HEP.             2. Pt will improve R SL balance from 4 seconds on non-compliant surface to >/= 20 seconds on non-compliant surface demonstrating improved proprioceptive input and minimize risk of future injury.             3. Pt will be able to ambulate >/= 1/2 mile without boot on level surfaces without complaints of increased R ankle pain. Long Term Goals: To be accomplished in 12 treatments: 
            1. Pt will be independent and compliant with HEP.             2. Pt will improve FOTO score by the MCID from 46/100 to 60/100 demonstrating improved overall function with decreased pain and discomfort. 
            3. Pt will improve R SL balance to >/= 30 seconds on compliant surface demonstrating improved proprioceptive input and minimize risk of future injury.             4. Pt will return to scuba diving without restriction or complaints of pain. 
            5. Pt will return to biking outside without complaints of pain or discomfort in R foot/ankle. 
            6. Pt will be able to perform >/= 10 R SL heel raises on the stairs demonstrating increased R calf strength. 
            7. Pt will improve DF CKC AROM by >/= 10 degrees facilitating improved gait mechanics.             8. Pt will be able to jog for >/= 1/2 mile without complaints of increased R ankle pain. 
  
  
  
PLAN [x]  Upgrade activities as tolerated     [x]  Continue plan of care [x]  Update interventions per flow sheet      
[]  Discharge due to:_ 
[]  Other:_   
 
 
 
 
Louis Jacob 10/29/2018  11:55 AM

## 2018-11-02 ENCOUNTER — HOSPITAL ENCOUNTER (OUTPATIENT)
Dept: PHYSICAL THERAPY | Age: 68
Discharge: HOME | End: 2018-11-02
Payer: MEDICARE

## 2018-11-02 PROCEDURE — 97140 MANUAL THERAPY 1/> REGIONS: CPT

## 2018-11-02 PROCEDURE — 97112 NEUROMUSCULAR REEDUCATION: CPT

## 2018-11-02 PROCEDURE — 97110 THERAPEUTIC EXERCISES: CPT

## 2018-11-02 NOTE — PROGRESS NOTES
PT DAILY TREATMENT NOTE - [facility] 2-15 Patient Name: Hernán Monahan. 
[id number] : 1950 [x]  Patient  Verified Payor: [place] [employer] / Plan: Gianluca Mcgeey / Product Type: Medicare / In time:11:30 am  Out time:12:30 pm 
Total Treatment Time (min): 60 minutes Total Timed Codes (min): 60 minutes 1:1 Treatment Time ( only): 45 minutes Visit #: 4 Treatment Area: Ankle pain [M25.579] SUBJECTIVE Pain Level (0-10 scale): 1/10 Any medication changes, allergies to medications, adverse drug reactions, diagnosis change, or new procedure performed?: [x] No    [] Yes (see summary sheet for update) Subjective functional status/changes:   [] No changes reported Pt admits he has not been very consistent with his HEP. Pt reports he is feeling better without the boot than with the boot at this point. OBJECTIVE 
  
36 min Therapeutic Exercise:  [x] See flow sheet :  
Rationale: increase ROM, increase strength, improve balance and increase proprioception to improve the patients ability to ambulate with decreased pain.  
  
14 min Neuromuscular Re-education:  [x]  See flow sheet : SLS; Rockerboard Rationale: improve balance and increase proprioception  to improve the patients ability to decrease risk of future injury. 
  
10 min Manual Therapy: A-P glides of R talocrural joint; Posterior distal fibular glides; Rearfoot eversion glides; P-A mobs of R talocrural joint Rationale: decrease pain, increase ROM and increase tissue extensibility to improve the patients ability to ambulate with decreased pain. 
  
With 
 [x] TE 
 [] TA 
 [] neuro 
 [] other: Patient Education: [x] Review HEP   
[] Progressed/Changed HEP based on:  
[] positioning   [] body mechanics   [] transfers   [] heat/ice application   
[] other:   
  
Other Objective/Functional Measures:       
  
CKC DF: 35  
  
Pain Level (0-10 scale) post treatment: 0/10 
  
ASSESSMENT/Changes in Function:  
 Pt demonstrated significantly improved proprioceptive input and balance in R SLS, holding for 30 seconds at a time which is improved from 3 seconds initially. Introduced TRX squats to promote increased talocrural mobility, dorsiflexion ROM, and strength of gluteal musculature. Continue progressing therex and SLS activity as tolerated/able. Patient will continue to benefit from skilled PT services to modify and progress therapeutic interventions, address functional mobility deficits, address ROM deficits, address strength deficits, analyze and address soft tissue restrictions, analyze and cue movement patterns, analyze and modify body mechanics/ergonomics and address imbalance/dizziness to attain remaining goals. 
  
[x]  See Plan of Care 
[]  See progress note/recertification 
[]  See Discharge Summary 
  
Progress towards goals / Updated goals: 
Short Term Goals: To be accomplished in 6 treatments: 
            1. Pt will be independent and compliant with HEP.             2. Pt will improve R SL balance from 4 seconds on non-compliant surface to >/= 20 seconds on non-compliant surface demonstrating improved proprioceptive input and minimize risk of future injury.             3. Pt will be able to ambulate >/= 1/2 mile without boot on level surfaces without complaints of increased R ankle pain. Long Term Goals: To be accomplished in 12 treatments: 
            1. Pt will be independent and compliant with HEP.             2. Pt will improve FOTO score by the MCID from 46/100 to 60/100 demonstrating improved overall function with decreased pain and discomfort. 
            3. Pt will improve R SL balance to >/= 30 seconds on compliant surface demonstrating improved proprioceptive input and minimize risk of future injury.             4. Pt will return to scuba diving without restriction or complaints of pain. 
            5.  Pt will return to biking outside without complaints of pain or discomfort in R foot/ankle. 
            6. Pt will be able to perform >/= 10 R SL heel raises on the stairs demonstrating increased R calf strength. 
            7. Pt will improve DF CKC AROM by >/= 10 degrees facilitating improved gait mechanics.             8. Pt will be able to jog for >/= 1/2 mile without complaints of increased R ankle pain. 
  
  
  
PLAN [x]  Upgrade activities as tolerated     [x]  Continue plan of care [x]  Update interventions per flow sheet      
[]  Discharge due to:_ 
[]  Other:_   
 
Izzy Cleary 11/2/2018  11:40 AM

## 2018-11-05 ENCOUNTER — HOSPITAL ENCOUNTER (OUTPATIENT)
Dept: PHYSICAL THERAPY | Age: 68
Discharge: HOME | End: 2018-11-05
Payer: MEDICARE

## 2018-11-05 ENCOUNTER — CLINICAL SUPPORT (OUTPATIENT)
Dept: INTERNAL MEDICINE CLINIC | Age: 68
End: 2018-11-05

## 2018-11-05 DIAGNOSIS — Z23 ENCOUNTER FOR IMMUNIZATION: Primary | ICD-10-CM

## 2018-11-05 PROCEDURE — 97110 THERAPEUTIC EXERCISES: CPT

## 2018-11-05 PROCEDURE — 97140 MANUAL THERAPY 1/> REGIONS: CPT

## 2018-11-05 PROCEDURE — 97016 VASOPNEUMATIC DEVICE THERAPY: CPT

## 2018-11-05 NOTE — PROGRESS NOTES
Chief Complaint   Patient presents with    Immunization/Injection     Patient was in the office today for a flu shot. Patient filled out form and received flu shot in the left deltoid. Patient waited in office and no reaction observed. Patient will follow up with next appointment.

## 2018-11-05 NOTE — PROGRESS NOTES
PT DAILY TREATMENT NOTE - [facility] 2-15 Patient Name: Deion Valenzuela. 
IGFS:[id number] : 1950 [x]  Patient  Verified Payor: Donavan Cover / Plan: Diego Matthewch / Product Type: PPO / In time:12:30 pm  Out time:2:05 pm 
Total Treatment Time (min): 95 minutes Total Timed Codes (min): 80 minutes 1:1 Treatment Time ( only): 40 minutes Visit #: 7 Treatment Area: right ankle pain SUBJECTIVE Pain Level (0-10 scale): 1/10 Any medication changes, allergies to medications, adverse drug reactions, diagnosis change, or new procedure performed?: [x] No    [] Yes (see summary sheet for update) Subjective functional status/changes:   [] No changes reported Pt reports he went to [email or web address] this weekend and walked 10,000 steps. He noted soreness in L ankle afterwards, but applied ice which helped relieve that soreness.    
  
OBJECTIVE 
  
           
Modality rationale: decrease inflammation, decrease pain and increase tissue extensibility to improve the patients ability to ambulate with decreased pain or discomfor Type Additional Details    
[] Estim: []Att   []Unatt    []TENS instruct 
                []IFC  []Premod   []NMES   
                 []Other:  []w/US   []w/ice   []w/heat Position: Location:    
[]  Traction: [] Cervical       []Lumbar 
                     [] Prone          []Supine 
                     []Intermittent   []Continuous Lbs: 
[] before manual 
[] after manual 
[]w/heat    
[]  Ultrasound: []Continuous   [] Pulsed  
                    EL: []7BDA   []3MHz Location: 
W/cm2:    
[] Paraffin  
  Location:  
[]w/heat    
[]  Ice     []  Heat 
[]  Ice massage Position: Location:    
[]  Laser 
[]  Other: Position: Location:    
[x]  Vasopneumatic Device     Medium 34 degrees (15 minutes)  
  
[x] Skin assessment post-treatment:  [x]intact []redness- no adverse reaction 
  []redness  adverse reaction:  
  
58 min Therapeutic Exercise:  [x] See flow sheet :  
 Rationale: increase ROM, increase strength, improve balance and increase proprioception to improve the patients ability to ambulate with decreased pain.  
  
12 min Neuromuscular Re-education:  [x]  See flow sheet : SLS; Rockerboard Rationale: improve balance and increase proprioception  to improve the patients ability to decrease risk of future injury. 
  
10 min Manual Therapy: A-P glides of R talocrural joint; Posterior distal fibular glides; Rearfoot eversion glides; P-A mobs of R talocrural joint; CKC DF MWM in half-kneel Rationale: decrease pain, increase ROM and increase tissue extensibility to improve the patients ability to ambulate with decreased pain. 
  
With 
 [x] TE 
 [] TA 
 [] neuro 
 [] other: Patient Education: [x] Review HEP   
[] Progressed/Changed HEP based on:  
[] positioning   [] body mechanics   [] transfers   [] heat/ice application   
[] other:   
  
Other Objective/Functional Measures:       
  
CKC DF: 38 
  
Pain Level (0-10 scale) post treatment: 0/10 
  
ASSESSMENT/Changes in Function:  
Introduced lateral BOSU step-overs; Pt noted feeling more stable on LLE vs RLE, though was able to perform with minimal intermittent handheld support and no pain. Progressed heel raises from level surface to stairs for increased talocrural mobility, calf stretch, and strengthening of gastroc-soleus complex; Pt able to perform without discomfort. Progressed SLS on non-compliant surface to SLS on compliant surface; Pt able to maintain balance utilizing ankle strategy for 5\" intervals with cues of maintaining contact of medial foot and avoiding inversion of R foot.  
Patient will continue to benefit from skilled PT services to modify and progress therapeutic interventions, address functional mobility deficits, address ROM deficits, address strength deficits, analyze and address soft tissue restrictions, analyze and cue movement patterns, analyze and modify body mechanics/ergonomics and address imbalance/dizziness to attain remaining goals. 
  
[x]  See Plan of Care 
[]  See progress note/recertification 
[]  See Discharge Summary 
  
Progress towards goals / Updated goals: 
Short Term Goals: To be accomplished in 6 treatments: 
            1. Pt will be independent and compliant with HEP.             2. Pt will improve R SL balance from 4 seconds on non-compliant surface to >/= 20 seconds on non-compliant surface demonstrating improved proprioceptive input and minimize risk of future injury.             3. Pt will be able to ambulate >/= 1/2 mile without boot on level surfaces without complaints of increased R ankle pain. Long Term Goals: To be accomplished in 12 treatments: 
            1. Pt will be independent and compliant with HEP.             2. Pt will improve FOTO score by the MCID from 46/100 to 60/100 demonstrating improved overall function with decreased pain and discomfort. 
            3. Pt will improve R SL balance to >/= 30 seconds on compliant surface demonstrating improved proprioceptive input and minimize risk of future injury.             4. Pt will return to scuba diving without restriction or complaints of pain. 
            5. Pt will return to biking outside without complaints of pain or discomfort in R foot/ankle. 
            6. Pt will be able to perform >/= 10 R SL heel raises on the stairs demonstrating increased R calf strength. 
            7. Pt will improve DF CKC AROM by >/= 10 degrees facilitating improved gait mechanics.             8. Pt will be able to jog for >/= 1/2 mile without complaints of increased R ankle pain. 
  
  
  
PLAN [x]  Upgrade activities as tolerated     [x]  Continue plan of care [x]  Update interventions per flow sheet      
[]  Discharge due to:_ 
[]  Other:_   
 
 
 
Sammie Crista 11/5/2018  12:22 PM

## 2018-11-08 ENCOUNTER — HOSPITAL ENCOUNTER (OUTPATIENT)
Dept: PHYSICAL THERAPY | Age: 68
Discharge: HOME | End: 2018-11-08
Payer: MEDICARE

## 2018-11-08 PROCEDURE — 97110 THERAPEUTIC EXERCISES: CPT

## 2018-11-08 PROCEDURE — 97140 MANUAL THERAPY 1/> REGIONS: CPT

## 2018-11-08 PROCEDURE — 97016 VASOPNEUMATIC DEVICE THERAPY: CPT

## 2018-11-08 NOTE — PROGRESS NOTES
PT DAILY TREATMENT NOTE - [facility] 2-15 Patient Name: Yaniv Smallwood. 
LIHD:[id number]/[id number] : 1950 [x]  Patient  Verified Payor: [place] [employer] / Plan: Gianluca Amador / Product Type: Medicare / In time:1:36 pm  Out time:2:48 pm 
Total Treatment Time (min): 72 minutes Total Timed Codes (min): 57 minutes 1:1 Treatment Time ( only): 28 minutes Visit #: 6 Treatment Area: right ankle pain SUBJECTIVE Pain Level (0-10 scale): 3/10 Any medication changes, allergies to medications, adverse drug reactions, diagnosis change, or new procedure performed?: [x] No    [] Yes (see summary sheet for update) Subjective functional status/changes:   [] No changes reported Pt reports he was very sore (6/10) after last session in the front of his ankle and R hip. He noted mild swelling in his R ankle.  
  
OBJECTIVE 
  
           
Modality rationale: decrease inflammation, decrease pain and increase tissue extensibility to improve the patients ability to ambulate with decreased pain or discomfor Type Additional Details    
[] Estim: []Att   []Unatt    []TENS instruct 
                []IFC  []Premod   []NMES   
                 []Other:  []w/US   []w/ice   []w/heat Position: Location:    
[]  Traction: [] Cervical       []Lumbar 
                     [] Prone          []Supine 
                     []Intermittent   []Continuous Lbs: 
[] before manual 
[] after manual 
[]w/heat    
[]  Ultrasound: []Continuous   [] Pulsed  
                    ST: []4PZA   []3MHz Location: 
W/cm2:    
[] Paraffin  
  Location:  
[]w/heat    
[]  Ice     []  Heat 
[]  Ice massage Position: Location:    
[]  Laser 
[]  Other: Position: Location:    
[x]  Vasopneumatic Device     Medium 34 degrees (15 minutes)  
  
[x] Skin assessment post-treatment:  [x]intact []redness- no adverse reaction 
  []redness  adverse reaction:  
  
36 min Therapeutic Exercise:  [x] See flow sheet :  
 Rationale: increase ROM, increase strength, improve balance and increase proprioception to improve the patients ability to ambulate with decreased pain.  
  
11 min Neuromuscular Re-education:  [x]  See flow sheet : SLS; Rockerboard Rationale: improve balance and increase proprioception  to improve the patients ability to decrease risk of future injury. 
  
10 min Manual Therapy: A-P glides of R talocrural joint; Posterior distal fibular glides; Rearfoot eversion glides; P-A mobs of R talocrural joint; CKC DF MWM in half-kneel Rationale: decrease pain, increase ROM and increase tissue extensibility to improve the patients ability to ambulate with decreased pain. 
  
With 
 [x] TE 
 [] TA 
 [] neuro 
 [] other: Patient Education: [x] Review HEP   
[] Progressed/Changed HEP based on:  
[] positioning   [] body mechanics   [] transfers   [] heat/ice application   
[] other:   
  
Other Objective/Functional Measures:       
  
CKC DF: 38 
  
Pain Level (0-10 scale) post treatment: 0/10 
  
ASSESSMENT/Changes in Function:  
Secondary to Pt reports of significant soreness after last session, did not add any new exercises or progressions today. Pt tolerated exercises and manual treatment well, noting feeling looser with less discomfort afterwards. Patient will continue to benefit from skilled PT services to modify and progress therapeutic interventions, address functional mobility deficits, address ROM deficits, address strength deficits, analyze and address soft tissue restrictions, analyze and cue movement patterns, analyze and modify body mechanics/ergonomics and address imbalance/dizziness to attain remaining goals. 
  
[x]  See Plan of Care 
[]  See progress note/recertification 
[]  See Discharge Summary 
  
Progress towards goals / Updated goals: 
Short Term Goals: To be accomplished in 6 treatments: 
            1. Pt will be independent and compliant with HEP.             2. Pt will improve R SL balance from 4 seconds on non-compliant surface to >/= 20 seconds on non-compliant surface demonstrating improved proprioceptive input and minimize risk of future injury.             3. Pt will be able to ambulate >/= 1/2 mile without boot on level surfaces without complaints of increased R ankle pain. Long Term Goals: To be accomplished in 12 treatments: 
            1. Pt will be independent and compliant with HEP.             2. Pt will improve FOTO score by the MCID from 46/100 to 60/100 demonstrating improved overall function with decreased pain and discomfort. 
            3. Pt will improve R SL balance to >/= 30 seconds on compliant surface demonstrating improved proprioceptive input and minimize risk of future injury.             4. Pt will return to scuba diving without restriction or complaints of pain. 
            5. Pt will return to biking outside without complaints of pain or discomfort in R foot/ankle. 
            6. Pt will be able to perform >/= 10 R SL heel raises on the stairs demonstrating increased R calf strength. 
            7. Pt will improve DF CKC AROM by >/= 10 degrees facilitating improved gait mechanics.             8. Pt will be able to jog for >/= 1/2 mile without complaints of increased R ankle pain. 
 
  
PLAN [x]  Upgrade activities as tolerated     [x]  Continue plan of care [x]  Update interventions per flow sheet      
[]  Discharge due to:_ 
[]  Other:_   
 
Teresa Antonio 11/8/2018  10:09 AM

## 2018-11-12 ENCOUNTER — HOSPITAL ENCOUNTER (OUTPATIENT)
Dept: PHYSICAL THERAPY | Age: 68
Discharge: HOME | End: 2018-11-12
Payer: MEDICARE

## 2018-11-12 PROCEDURE — 97110 THERAPEUTIC EXERCISES: CPT

## 2018-11-12 PROCEDURE — 97112 NEUROMUSCULAR REEDUCATION: CPT

## 2018-11-12 PROCEDURE — 97016 VASOPNEUMATIC DEVICE THERAPY: CPT

## 2018-11-12 NOTE — PROGRESS NOTES
PT DAILY TREATMENT NOTE - [facility] 2-15 Patient Name: Starr Oleary. 
SMXW:15/54[birth year]08 : 1950 [x]  Patient  Verified Payor: [place] [employer] / Plan: Gianluca Mcgeey / Product Type: Medicare / In time:12:30 pm  Out time:1:44 pm 
Total Treatment Time (min): 74 minutes Total Timed Codes (min): 59 minutes 1:1 Treatment Time ( only): 47 minutes Visit #: 4 Treatment Area: Right ankle pain [M25.571] SUBJECTIVE Pain Level (0-10 scale): 1/10 Any medication changes, allergies to medications, adverse drug reactions, diagnosis change, or new procedure performed?: [x] No    [] Yes (see summary sheet for update) Subjective functional status/changes:   [] No changes reported Pt reports he was raking and bagging leaves yesterday and did 11,000 steps; he did not experience any pain during or afterwards. OBJECTIVE 
  
           
Modality rationale: decrease inflammation, decrease pain and increase tissue extensibility to improve the patients ability to ambulate with decreased pain or discomfor Type Additional Details    
[] Estim: []Att   []Unatt    []TENS instruct 
                []IFC  []Premod   []NMES   
                 []Other:  []w/US   []w/ice   []w/heat Position: Location:    
[]  Traction: [] Cervical       []Lumbar 
                     [] Prone          []Supine 
                     []Intermittent   []Continuous Lbs: 
[] before manual 
[] after manual 
[]w/heat    
[]  Ultrasound: []Continuous   [] Pulsed  
                    YW: []2YEU   []3MHz Location: 
W/cm2:    
[] Paraffin  
  Location:  
[]w/heat    
[]  Ice     []  Heat 
[]  Ice massage Position: Location:    
[]  Laser 
[]  Other: Position: Location:    
[x]  Vasopneumatic Device     Medium 34 degrees (15 minutes)  
  
[x] Skin assessment post-treatment:  [x]intact []redness- no adverse reaction 
  []redness  adverse reaction:  
  
41 min Therapeutic Exercise:  [x] See flow sheet :  
 Rationale: increase ROM, increase strength, improve balance and increase proprioception to improve the patients ability to ambulate with decreased pain.  
  
18 min Neuromuscular Re-education:  [x]  See flow sheet : SLS; Rockerboard Rationale: improve balance and increase proprioception  to improve the patients ability to decrease risk of future injury. 
  
With 
 [x] TE 
 [] TA 
 [] neuro 
 [] other: Patient Education: [x] Review HEP   
[] Progressed/Changed HEP based on:  
[] positioning   [] body mechanics   [] transfers   [] heat/ice application   
[] other:   
  
Other Objective/Functional Measures:       
  
CKC DF: 38 
  
Pain Level (0-10 scale) post treatment: 0/10 
  
ASSESSMENT/Changes in Function:  
Introduced rebounder exercise in SLS on non-compliant surface; Pt demonstrated occasional LOB, but overall was able to perform adequately without any pain. Re-initiated lateral BOSU step-overs as they caused soreness in R foot/ankle a couple of sessions ago; Pt reported feeling \"a world of difference\" compared to last time, demonstrating improved stability and no complaints of pain. Progressed clocks to foam for added challenge in stability and balance on compliant surface while simultaneously facilitating improved hip strength. Patient will continue to benefit from skilled PT services to modify and progress therapeutic interventions, address functional mobility deficits, address ROM deficits, address strength deficits, analyze and address soft tissue restrictions, analyze and cue movement patterns, analyze and modify body mechanics/ergonomics and address imbalance/dizziness to attain remaining goals. 
  
[x]  See Plan of Care 
[]  See progress note/recertification 
[]  See Discharge Summary 
  
Progress towards goals / Updated goals: 
Short Term Goals: To be accomplished in 6 treatments: 
            1. Pt will be independent and compliant with HEP.             2. Pt will improve R SL balance from 4 seconds on non-compliant surface to >/= 20 seconds on non-compliant surface demonstrating improved proprioceptive input and minimize risk of future injury.             3. Pt will be able to ambulate >/= 1/2 mile without boot on level surfaces without complaints of increased R ankle pain. Long Term Goals: To be accomplished in 12 treatments: 
            1. Pt will be independent and compliant with HEP.             2. Pt will improve FOTO score by the MCID from 46/100 to 60/100 demonstrating improved overall function with decreased pain and discomfort. 
            3. Pt will improve R SL balance to >/= 30 seconds on compliant surface demonstrating improved proprioceptive input and minimize risk of future injury.             4. Pt will return to scuba diving without restriction or complaints of pain. 
            5. Pt will return to biking outside without complaints of pain or discomfort in R foot/ankle. 
            6. Pt will be able to perform >/= 10 R SL heel raises on the stairs demonstrating increased R calf strength. 
            7. Pt will improve DF CKC AROM by >/= 10 degrees facilitating improved gait mechanics.             8. Pt will be able to jog for >/= 1/2 mile without complaints of increased R ankle pain. 
  
  
PLAN [x]  Upgrade activities as tolerated     [x]  Continue plan of care [x]  Update interventions per flow sheet      
[]  Discharge due to:_ 
[]  Other:_   
  
 
Jennifer Yarsanism 11/12/2018  7:21 AM

## 2018-11-15 ENCOUNTER — HOSPITAL ENCOUNTER (OUTPATIENT)
Dept: PHYSICAL THERAPY | Age: 68
Discharge: HOME | End: 2018-11-15
Payer: MEDICARE

## 2018-11-15 PROCEDURE — 97112 NEUROMUSCULAR REEDUCATION: CPT

## 2018-11-15 PROCEDURE — 97016 VASOPNEUMATIC DEVICE THERAPY: CPT

## 2018-11-15 PROCEDURE — 97110 THERAPEUTIC EXERCISES: CPT

## 2018-11-15 NOTE — PROGRESS NOTES
PT DAILY TREATMENT NOTE - [facility] 2-15 Patient Name: Paula Savage. 
UPHS: : 1950 [x]  Patient  Verified Payor: [place] [employer] / Plan: Gianluca Baez y / Product Type: Medicare / In time:12:30 pm  Out time:1:42 pm 
Total Treatment Time (min): 72 minutes Total Timed Codes (min): 57 minutes 1:1 Treatment Time ( only): 40 minutes Visit #: 6 Treatment Area: right ankle pain Pain Level (0-10 scale): 0.5/10 Any medication changes, allergies to medications, adverse drug reactions, diagnosis change, or new procedure performed?: [x] No    [] Yes (see summary sheet for update) Subjective functional status/changes:   [] No changes reported Pt reports his ankle has been doing well. He noticed mild soreness in anterior ankle yesterday, but otherwise no complaints. 
  
OBJECTIVE 
  
           
Modality rationale: decrease inflammation, decrease pain and increase tissue extensibility to improve the patients ability to ambulate with decreased pain or discomfor Type Additional Details    
[] Estim: []Att   []Unatt    []TENS instruct 
                []IFC  []Premod   []NMES   
                 []Other:  []w/US   []w/ice   []w/heat Position: Location:    
[]  Traction: [] Cervical       []Lumbar 
                     [] Prone          []Supine 
                     []Intermittent   []Continuous Lbs: 
[] before manual 
[] after manual 
[]w/heat    
[]  Ultrasound: []Continuous   [] Pulsed  
                    KT: []3YQH   []3MHz Location: 
W/cm2:    
[] Paraffin  
  Location:  
[]w/heat    
[]  Ice     []  Heat 
[]  Ice massage Position: Location:    
[]  Laser 
[]  Other: Position: Location:    
[x]  Vasopneumatic Device     Medium 34 degrees (15 minutes)  
  
[x] Skin assessment post-treatment:  [x]intact []redness- no adverse reaction 
  []redness  adverse reaction:  
  
32 min Therapeutic Exercise:  [x] See flow sheet :  
 Rationale: increase ROM, increase strength, improve balance and increase proprioception to improve the patients ability to ambulate with decreased pain.  
  
25 min Neuromuscular Re-education:  [x]  See flow sheet : SLS; Rockerboard Rationale: improve balance and increase proprioception  to improve the patients ability to decrease risk of future injury. 
 
  
With 
 [x] TE 
 [] TA 
 [] neuro 
 [] other: Patient Education: [x] Review HEP   
[] Progressed/Changed HEP based on:  
[] positioning   [] body mechanics   [] transfers   [] heat/ice application   
[] other:   
  
Other Objective/Functional Measures:       
  
CKC DF: 38 
  
Pain Level (0-10 scale) post treatment: 0/10 
  
ASSESSMENT/Changes in Function:  
Pt able to hold R SL balance on foam for 25 seconds today before touching down. Added SL squats on TRX today to promote strength in RLE and stability on right foot/ankle; Provided cues for keeping R knee faced forward and sitting back into motion; Pt able to tolerate without pain and with occasional LOB. Also introduced R TRX side-lunges with cues for keeping R foot forward, straightening left leg, and dynamic push off R heel; Pt responded well to exercise and performed without pain. Patient will continue to benefit from skilled PT services to modify and progress therapeutic interventions, address functional mobility deficits, address ROM deficits, address strength deficits, analyze and address soft tissue restrictions, analyze and cue movement patterns, analyze and modify body mechanics/ergonomics and address imbalance/dizziness to attain remaining goals. 
  
[x]  See Plan of Care 
[]  See progress note/recertification 
[]  See Discharge Summary 
  
Progress towards goals / Updated goals: 
Short Term Goals: To be accomplished in 6 treatments: 
            1. Pt will be independent and compliant with HEP.  
            2. Pt will improve R SL balance from 4 seconds on non-compliant surface to >/= 20 seconds on non-compliant surface demonstrating improved proprioceptive input and minimize risk of future injury.             3. Pt will be able to ambulate >/= 1/2 mile without boot on level surfaces without complaints of increased R ankle pain. Long Term Goals: To be accomplished in 12 treatments: 
            1. Pt will be independent and compliant with HEP.             2. Pt will improve FOTO score by the MCID from 46/100 to 60/100 demonstrating improved overall function with decreased pain and discomfort. 
            3. Pt will improve R SL balance to >/= 30 seconds on compliant surface demonstrating improved proprioceptive input and minimize risk of future injury.             4. Pt will return to scuba diving without restriction or complaints of pain. 
            5. Pt will return to biking outside without complaints of pain or discomfort in R foot/ankle. 
            6. Pt will be able to perform >/= 10 R SL heel raises on the stairs demonstrating increased R calf strength. 
            7. Pt will improve DF CKC AROM by >/= 10 degrees facilitating improved gait mechanics.             8. Pt will be able to jog for >/= 1/2 mile without complaints of increased R ankle pain. 
  
  
PLAN [x]  Upgrade activities as tolerated     [x]  Continue plan of care [x]  Update interventions per flow sheet      
[]  Discharge due to:_ 
[]  Other:_   
 
 
 
Elissa Russo 11/15/2018  10:17 AM

## 2018-11-19 ENCOUNTER — HOSPITAL ENCOUNTER (OUTPATIENT)
Dept: PHYSICAL THERAPY | Age: 68
Discharge: HOME | End: 2018-11-19
Payer: MEDICARE

## 2018-11-19 PROCEDURE — 97140 MANUAL THERAPY 1/> REGIONS: CPT

## 2018-11-19 PROCEDURE — G8978 MOBILITY CURRENT STATUS: HCPCS

## 2018-11-19 PROCEDURE — G8979 MOBILITY GOAL STATUS: HCPCS

## 2018-11-19 PROCEDURE — 97112 NEUROMUSCULAR REEDUCATION: CPT

## 2018-11-19 PROCEDURE — 97110 THERAPEUTIC EXERCISES: CPT

## 2018-11-19 NOTE — PROGRESS NOTES
[facility] [address] [address], [address] Phone: [phone] Fax: [phone] Progress Note Name: Alejandro Rasmussen Sr.  
: 1950 MD: Shannon Neumann MD 
  
 
Treatment Diagnosis: Right ankle pain [M25.571] Start of Care: 10/22/18 Visits from Start of Care: 9 Missed Visits: 0 Summary of Care: Mr. Hammad Pa has been seen for 9 skilled physical therapy visits as he is S/P arthroscopic debridement of his right ankle (18) secondary to an osteochondral lesion. Pt has progressed very well with his therapy program which has emphasized increasing lower extremity strength, improving talocrural mobility and ROM, improving proprioceptive input with static and dynamic activities, balance and agility training, gluteal strengthening, and decreasing tightness of lower extremity musculature via therapeutic exercise, manual therapy techniques, and neuromuscular re-education. Pt reports feeling 75% improved since beginning therapy and has increased his FOTO score by greater than the MCID from 46/100 to 70/100 demonstrating improved overall function with decreased pain. He has improved his single leg balance time from 4 seconds to 38 seconds demonstrating improved strength, stability, and proprioception. He is also able to walk long distances without a boot or assistive device and is no longer experiencing discomfort in his right foot. He does still experience mild discomfort with extensive yard work and is continuing to improve his ankle stability/agility work to return to all prior activities with minimized risk of further injury or pain. Pt would benefit from additional PT to further address the above-listed impairments and continue to develop a comprehensive HEP. Thank you for this referral! 
 
Assessment / Recommendations: 3 additional sessions Short Term Goals: To be accomplished in 6 treatments:             1. Pt will be independent and compliant with HEP.  - MET 
            2. Pt will improve R SL balance from 4 seconds on non-compliant surface to >/= 20 seconds on non-compliant surface demonstrating improved proprioceptive input and minimize risk of future injury. - MET (38 seconds) 
            3. Pt will be able to ambulate >/= 1/2 mile without boot on level surfaces without complaints of increased R ankle pain. - MET Long Term Goals: To be accomplished in 12 treatments: 
            1. Pt will be independent and compliant with HEP.  - MET 
            2. Pt will improve FOTO score by the MCID from 46/100 to 60/100 demonstrating improved overall function with decreased pain and discomfort. - MET (70/100)             3. Pt will improve R SL balance to >/= 30 seconds on compliant surface demonstrating improved proprioceptive input and minimize risk of future injury. - Progressing (24 seconds) 
            4. Pt will return to scuba diving without restriction or complaints of pain. - Progressing (will attempt next week) 
            5. Pt will return to biking outside without complaints of pain or discomfort in R foot/ankle. - Progressing (has not attempted yet)             6. Pt will be able to perform >/= 10 R SL heel raises on the stairs demonstrating increased R calf strength. - Progressing (chronic great toe discomfort) 
            7. Pt will improve DF CKC AROM by >/= 10 degrees facilitating improved gait mechanics. - Progressing (35 to 42 degrees) 
            8. Pt will be able to jog for >/= 1/2 mile without complaints of increased R ankle pain. - Discontinued, he was advised to avoid jogging due to back pain  
  
 
G-Codes (GP) Mobility  Current  CJ= 20-39%   Goal  CK= 40-59% The severity rating is based on the FOTO Score score. Izzy Cleary 11/19/2018 7:37 AM 
 
________________________________________________________________________ NOTE TO PHYSICIAN:  Please complete the following and fax to: [facility]: Fax: [phone]. Alivia Lyons Retain this original for your records. If you are unable to process this request in 24 hours, please contact our office. ____ I have read the above report and request that my patient continue therapy with the following changes/special instructions: 
____ I have read the above report and request that my patient be discharged from therapy [address] [place] [address] Signature:_________________ Date:___________Time:__________

## 2018-11-19 NOTE — PROGRESS NOTES
PT DAILY TREATMENT NOTE - [facility] 2-15 Patient Name: Shiva Zavala. 
UWPN: : 1950 [x]  Patient  Verified Payor: [place] MEDICARE / Plan: Gianluca Amador / Product Type: Medicare / In time:12:27 pm  Out time:1:48 PM 
Total Treatment Time (min): 81 minutes Total Timed Codes (min): 71 minutes 1:1 Treatment Time ( only): 64 minutes Visit #: 3 Treatment Area: No admission diagnoses are documented for this encounter. Pain Level (0-10 scale): 3/10 Any medication changes, allergies to medications, adverse drug reactions, diagnosis change, or new procedure performed?: [x] No    [] Yes (see summary sheet for update) Subjective functional status/changes:   [] No changes reported Pt reports he did a lot of yard work yesterday and then sat outside for a couple of hours yesterday which has led to soreness in his ankle as well as \"most joints\" throughout his body. 
  
OBJECTIVE 
  
           
Modality rationale: decrease inflammation, decrease pain and increase tissue extensibility to improve the patients ability to ambulate with decreased pain or discomfor Type Additional Details    
[] Estim: []Att   []Unatt    []TENS instruct 
                []IFC  []Premod   []NMES   
                 []Other:  []w/US   []w/ice   []w/heat Position: Location:    
[]  Traction: [] Cervical       []Lumbar 
                     [] Prone          []Supine 
                     []Intermittent   []Continuous Lbs: 
[] before manual 
[] after manual 
[]w/heat    
[]  Ultrasound: []Continuous   [] Pulsed  
                    RD: []5WOU   []3MHz Location: 
W/cm2:    
[] Paraffin  
  Location:  
[]w/heat    
[x]  Ice     []  Heat 
[]  Ice massage Position: Sitting Location: R foot/ankle  10 minutes []  Laser 
[]  Other: Position: Location:    
[]  Vasopneumatic Device        
  
[x] Skin assessment post-treatment:  [x]intact []redness- no adverse reaction 
  []redness  adverse reaction:  
  
 20 min Therapeutic Exercise:  [x] See flow sheet :  
Rationale: increase ROM, increase strength, improve balance and increase proprioception to improve the patients ability to ambulate with decreased pain.  
  
41 min Neuromuscular Re-education:  [x]  See flow sheet : SLS; Rockerboard Rationale: improve balance and increase proprioception  to improve the patients ability to decrease risk of future injury. 
  
10 min Manual Therapy: A-P glides of R talocrural joint; Posterior distal fibular glides; Rearfoot eversion glides; P-A mobs of R talocrural joint; CKC DF MWM in half-kneel; R great toe traction and Grade III mobilizations for great toe EXT Rationale: decrease pain, increase ROM and increase tissue extensibility to improve the patients ability to ambulate with decreased pain. 
  
With 
 [x] TE 
 [] TA 
 [] neuro 
 [] other: Patient Education: [x] Review HEP   
[] Progressed/Changed HEP based on:  
[] positioning   [] body mechanics   [] transfers   [] heat/ice application   
[] other:   
  
Other Objective/Functional Measures:       
0-100: 75% improved; increase stability of ankle FOTO Score: 70/100 CKC DF: 42 degrees 
  
Pain Level (0-10 scale) post treatment: 0/10 
  
ASSESSMENT/Changes in Function:  
 
[]  See Plan of Care [x]  See progress note/recertification 
[]  See Discharge Summary 
  
Progress towards goals / Updated goals: 
Short Term Goals: To be accomplished in 6 treatments: 
            1. Pt will be independent and compliant with HEP.  - MET 
            2. Pt will improve R SL balance from 4 seconds on non-compliant surface to >/= 20 seconds on non-compliant surface demonstrating improved proprioceptive input and minimize risk of future injury. - MET (38 seconds) 
            3. Pt will be able to ambulate >/= 1/2 mile without boot on level surfaces without complaints of increased R ankle pain. - MET Long Term Goals: To be accomplished in 12 treatments:             1. Pt will be independent and compliant with HEP.  - MET 
            2. Pt will improve FOTO score by the MCID from 46/100 to 60/100 demonstrating improved overall function with decreased pain and discomfort. - MET (70/100)             3. Pt will improve R SL balance to >/= 30 seconds on compliant surface demonstrating improved proprioceptive input and minimize risk of future injury. - Progressing (24 seconds) 
            4. Pt will return to scuba diving without restriction or complaints of pain. - Progressing (will attempt next week) 
            5. Pt will return to biking outside without complaints of pain or discomfort in R foot/ankle. - Progressing (has not attempted yet)             6. Pt will be able to perform >/= 10 R SL heel raises on the stairs demonstrating increased R calf strength. - Progressing (chronic great toe discomfort) 
            7. Pt will improve DF CKC AROM by >/= 10 degrees facilitating improved gait mechanics. - Progressing (35 to 42 degrees) 
            8. Pt will be able to jog for >/= 1/2 mile without complaints of increased R ankle pain. - Discontinued, he was advised to avoid jogging due to back pain  
  
  
PLAN [x]  Upgrade activities as tolerated     [x]  Continue plan of care [x]  Update interventions per flow sheet      
[]  Discharge due to:_ 
[]  Other:_   
 
Norma Berg 11/19/2018  7:36 AM

## 2018-12-03 ENCOUNTER — HOSPITAL ENCOUNTER (OUTPATIENT)
Dept: PHYSICAL THERAPY | Age: 68
End: 2018-12-03
Payer: MEDICARE

## 2018-12-24 ENCOUNTER — HOSPITAL ENCOUNTER (OUTPATIENT)
Dept: PHYSICAL THERAPY | Age: 68
Discharge: HOME | End: 2018-12-24
Payer: MEDICARE

## 2018-12-24 PROCEDURE — 97016 VASOPNEUMATIC DEVICE THERAPY: CPT | Performed by: PHYSICAL THERAPIST

## 2018-12-24 PROCEDURE — 97110 THERAPEUTIC EXERCISES: CPT | Performed by: PHYSICAL THERAPIST

## 2018-12-24 PROCEDURE — 97112 NEUROMUSCULAR REEDUCATION: CPT | Performed by: PHYSICAL THERAPIST

## 2018-12-24 PROCEDURE — 97140 MANUAL THERAPY 1/> REGIONS: CPT | Performed by: PHYSICAL THERAPIST

## 2018-12-24 NOTE — PROGRESS NOTES
PT DAILY TREATMENT NOTE - [facility] 2-15    Patient Name: Starr Oleary.  Date:2018  : 1950  [x]  Patient  Verified  [place]or: [place] MEDICARE / Plan: [place] MEDICARE PART A & B / Product Type: Medicare /    In time:1120a  Out time:1235p  Total Treatment Time (min): 75  Total Timed Codes (min): 60  1:1 Treatment Time ( only): 60   Visit #: 10     Treatment Area: RIGHT ANKLE PAIN    SUBJECTIVE  Pain Level (0-10 scale): 5-6/10  Any medication changes, allergies to medications, adverse drug reactions, diagnosis change, or new procedure performed?: [x] No    [] Yes (see summary sheet for update)  Subjective functional status/changes:   [] No changes reported  Pt states that he felt an increase in Right ankle pain after his last session. He has been sore in the \"front and back of his ankle since the last session\".     OBJECTIVE                Modality rationale: decrease inflammation, decrease pain and increase tissue extensibility to improve the patients ability to ambulate with decreased pain or discomfor   Type Additional Details     [] Estim: []Att   []Unatt    []TENS instruct                  []IFC  []Premod   []NMES                     []Other:  []w/US   []w/ice   []w/heat  Position:  Location:     []  Traction: [] Cervical       []Lumbar                       [] Prone          []Supine                       []Intermittent   []Continuous Lbs:  [] before manual  [] after manual  []w/heat     []  Ultrasound: []Continuous   [] Pulsed                       at: []1MHz   []3MHz Location:  W/cm2:     [] Paraffin     Location:   []w/heat     []  Ice     []  Heat  []  Ice massage Position: Sitting  Location: R foot/ankle    []  Laser  []  Other: Position:  Location:     [x]  Vasopneumatic Device  x 15 min   Medium 34 degrees       [x] Skin assessment post-treatment:  [x]intact []redness- no adverse reaction    []redness  adverse reaction:      35 min Therapeutic Exercise:  [x] See flow sheet : review of current status; passive 1st MTP extension stretching   Rationale: increase ROM, increase strength, improve balance and increase proprioception to improve the patients ability to ambulate with decreased pain.      15 min Neuromuscular Re-education:  [x]  See flow sheet : SLS; Rockerboard   Rationale: improve balance and increase proprioception  to improve the patients ability to decrease risk of future injury.     10 min Manual Therapy: A-P glides of R talocrural joint;  R great toe traction and Grade III mobilizations for great toe EXT   Rationale: decrease pain, increase ROM and increase tissue extensibility to improve the patients ability to ambulate with decreased pain.     With   [x] TE   [] TA   [] neuro   [] other: Patient Education: [x] Review HEP    [] Progressed/Changed HEP based on:   [] positioning   [] body mechanics   [] transfers   [] heat/ice application    [] other:       Other Objective/Functional Measures:        Post-Treat Ankle PROM Dorsiflexion 18 (open chain); 1st MTP Ext 70     Pain Level (0-10 scale) post treatment: 1/10     ASSESSMENT/Changes in Function:   Due to increased Tissue irritation level today we backed off more dynamic exercise and focused on mobility and active control. Reported increase of difficulty during single limb ball toss on level surface after 1 set and was fearful to advance to second set. Improved 1st MTP extension today and worked iwthin patient tolerance of ankle dorsiflexion. Denied pain along muscle tendons in ankle area but pain within the joint both anterior and posterior portions.   Patient will continue to benefit from skilled PT services to modify and progress therapeutic interventions, address functional mobility deficits, address ROM deficits, address strength deficits, analyze and address soft tissue restrictions, analyze and cue movement patterns, analyze and modify body mechanics/ergonomics and address imbalance/dizziness to attain remaining goals.         []  See Plan of Care  []  See progress note/recertification  []  See Discharge Summary     Progress towards goals / Updated goals:  Short Term Goals: To be accomplished in 6 treatments:              1. Pt will be independent and compliant with HEP.  - MET              2. Pt will improve R SL balance from 4 seconds on non-compliant surface to >/= 20 seconds on non-compliant surface demonstrating improved proprioceptive input and minimize risk of future injury. - MET (38 seconds)              3. Pt will be able to ambulate >/= 1/2 mile without boot on level surfaces without complaints of increased R ankle pain. - MET  Long Term Goals: To be accomplished in 12 treatments:              1. Pt will be independent and compliant with HEP.  - MET              2. Pt will improve FOTO score by the MCID from 46/100 to 60/100 demonstrating improved overall function with decreased pain and discomfort. - MET (70/100)              3. Pt will improve R SL balance to >/= 30 seconds on compliant surface demonstrating improved proprioceptive input and minimize risk of future injury. - Progressing (24 seconds)              4. Pt will return to scuba diving without restriction or complaints of pain. - Progressing (will attempt next week)              5. Pt will return to biking outside without complaints of pain or discomfort in R foot/ankle. - Progressing (has not attempted yet)              6. Pt will be able to perform >/= 10 R SL heel raises on the stairs demonstrating increased R calf strength. - Progressing (chronic great toe discomfort)              7. Pt will improve DF CKC AROM by >/= 10 degrees facilitating improved gait mechanics. - Progressing (35 to 42 degrees)              8. Pt will be able to jog for >/= 1/2 mile without complaints of increased R ankle pain.  - Discontinued, he was advised to avoid jogging due to back pain         PLAN  [x]  Upgrade activities as tolerated     [x]  Continue plan of care  [x]  Update interventions per flow sheet       []  Discharge due to:_  []  Other:_              Loree Lowry PT, DPT 12/24/2018  11:36 AM

## 2019-01-03 ENCOUNTER — HOSPITAL ENCOUNTER (OUTPATIENT)
Dept: PHYSICAL THERAPY | Age: 69
Discharge: HOME | End: 2019-01-03
Payer: MEDICARE

## 2019-01-03 PROCEDURE — 97110 THERAPEUTIC EXERCISES: CPT

## 2019-01-03 PROCEDURE — G8980 MOBILITY D/C STATUS: HCPCS

## 2019-01-03 PROCEDURE — G8979 MOBILITY GOAL STATUS: HCPCS

## 2019-01-03 NOTE — PROGRESS NOTES
PT DAILY TREATMENT NOTE - [facility] 2-15 Patient Name: Carmencita Gonzales 
ILSC:0[birth year] : 1950 [x]  Patient  Verified Payor: [place] MEDICARE / Plan: Gianluca Amador / Product Type: Medicare / In time: 12:00 pm  Out time:12:52 pm 
Total Treatment Time (min): 52 minutes Total Timed Codes (min): 52 minutes 1:1 Treatment Time ( only): 30 minutes Visit #:  48 Treatment Area: RIGHT ANKLE PAIN 
 
SUBJECTIVE Pain Level (0-10 scale): 0/10 Any medication changes, allergies to medications, adverse drug reactions, diagnosis change, or new procedure performed?: [x] No    [] Yes (see summary sheet for update) Subjective functional status/changes:   [] No changes reported Pt reports he has been feeling much better and has not been having any pain. Pt notes mild soreness in front and lateral part of right ankle in the morning.    
  
OBJECTIVE 
 
  
37 min Therapeutic Exercise:  [x] See flow sheet : review of current status; passive 1st MTP extension stretching Rationale: increase ROM, increase strength, improve balance and increase proprioception to improve the patients ability to ambulate with decreased pain.  
  
15 min Neuromuscular Re-education:  [x]  See flow sheet : SLS; Rockerboard Rationale: improve balance and increase proprioception  to improve the patients ability to decrease risk of future injury. 
  
With 
 [x] TE 
 [] TA 
 [] neuro 
 [] other: Patient Education: [x] Review HEP   
[] Progressed/Changed HEP based on:  
[] positioning   [] body mechanics   [] transfers   [] heat/ice application   
[] other:   
  
Other Objective/Functional Measures:       
0-100: 90% improved FOTO Score: 89/100 
  
Pain Level (0-10 scale) post treatment: 0/10 
  
ASSESSMENT/Changes in Function:  
  
[]  See Plan of Care 
[]  See progress note/recertification [x]  See Discharge Summary 
  
Progress towards goals / Updated goals: 
Short Term Goals: To be accomplished in 6 treatments:             1. Pt will be independent and compliant with HEP.  - MET 
            2. Pt will improve R SL balance from 4 seconds on non-compliant surface to >/= 20 seconds on non-compliant surface demonstrating improved proprioceptive input and minimize risk of future injury. - MET (60+ seconds) 
            3. Pt will be able to ambulate >/= 1/2 mile without boot on level surfaces without complaints of increased R ankle pain.  - MET Long Term Goals: To be accomplished in 12 treatments: 
            1. Pt will be independent and compliant with HEP.  - MET 
            2. Pt will improve FOTO score by the MCID from 46/100 to 60/100 demonstrating improved overall function with decreased pain and discomfort. - MET (89/100)             3. Pt will improve R SL balance to >/= 30 seconds on compliant surface demonstrating improved proprioceptive input and minimize risk of future injury. - MET (60+ seconds) 
            4. Pt will return to scuba diving without restriction or complaints of pain. - Discontinued (no access) 
            5. Pt will return to biking outside without complaints of pain or discomfort in R foot/ankle. - Will attempt over next 2 weeks             6. Pt will be able to perform >/= 10 R SL heel raises on the stairs demonstrating increased R calf strength. - MET 
            7. Pt will improve DF CKC AROM by >/= 10 degrees facilitating improved gait mechanics. - MET (45 degrees)   
            8. Pt will be able to jog for >/= 1/2 mile without complaints of increased R ankle pain. - MET via elliptical 
  
PLAN 
[]  Upgrade activities as tolerated     []  Continue plan of care 
[]  Update interventions per flow sheet      
[x]  Discharge due to: Pt reaching or progressing towards all short and long-term goals.  
[]  Other:_   
 
 
 
 
Orion Hilt 1/3/2019  10:07 AM

## 2019-01-03 NOTE — ANCILLARY DISCHARGE INSTRUCTIONS
[facility] [address] [address], [address] Phone: [phone] Fax: [phone] Discharge Summary 2-15 Patient name: Moira Karimi Sr.  : 1950  Provider#: [id number] Referral source: Korina Adkins MD     
Medical/Treatment Diagnosis: RIGHT ANKLE PAIN Prior Hospitalization: see medical history Comorbidities: Arthritis, Back pain Prior Level of Function: Patient completed 20 minutes of exercise at least 3 times a week Medications: Verified on Patient Summary List 
  
Start of Care: 10/22/18      Onset Date: S/P Surgery 18 Visits from Start of Care: 11     Missed Visits: 1 Reporting Period : 10/22/18 to 19 Assessment/Summary of care:  Mr. Rose Elam was seen for a total of 11 skilled physical therapy visits S/P arthroscopic debridement of his right ankle (18) secondary to an osteochondral lesion. Pt progressed very well with his therapy program which emphasized increasing lower extremity strength, improving talocrural mobility and ROM, improving proprioceptive input with static and dynamic activities, balance and agility training, gluteal strengthening, and decreasing tightness of lower extremity musculature via therapeutic exercise, manual therapy techniques, and neuromuscular re-education. Pt reports feeling 90% improved since beginning therapy and has increased his FOTO score by greater than the MCID from 46/100 to 89/100 demonstrating improved overall function with decreased pain. Pt has returned to walking in the community and running on the elliptical without any complaints of pain or discomfort. He reports occasional stiffness in his ankle in the morning, however, does not experience any pain with functional or recreational activities. He demonstrates significantly improved talocrural mobility and ROM which has led to normal gait mechanics without a boot or any assistive device.   Pt has met all short and long-term goals and has been provided with a comprehensive HEP in order to further progress independently. Thank you for this referral! 
 
Short Term Goals: To be accomplished in 6 treatments: 
            1. Pt will be independent and compliant with HEP.  - MET 
            2. Pt will improve R SL balance from 4 seconds on non-compliant surface to >/= 20 seconds on non-compliant surface demonstrating improved proprioceptive input and minimize risk of future injury. - MET (60+ seconds) 
            3. Pt will be able to ambulate >/= 1/2 mile without boot on level surfaces without complaints of increased R ankle pain.  - MET Long Term Goals: To be accomplished in 12 treatments: 
            1. Pt will be independent and compliant with HEP.  - MET 
            2. Pt will improve FOTO score by the MCID from 46/100 to 60/100 demonstrating improved overall function with decreased pain and discomfort. - MET (89/100)             3. Pt will improve R SL balance to >/= 30 seconds on compliant surface demonstrating improved proprioceptive input and minimize risk of future injury. - MET (60+ seconds) 
            4. Pt will return to scuba diving without restriction or complaints of pain. - Discontinued (no access) 
            5. Pt will return to biking outside without complaints of pain or discomfort in R foot/ankle. - MET 
            6. Pt will be able to perform >/= 10 R SL heel raises on the stairs demonstrating increased R calf strength. - MET 
            7. Pt will improve DF CKC AROM by >/= 10 degrees facilitating improved gait mechanics. - MET (45 degrees)   
            8. Pt will be able to jog for >/= 1/2 mile without complaints of increased R ankle pain. - MET via elliptical 
 
G-Codes (GP) Mobility  Goal  CK= 40-59%  D/C  CI= 1-19% The severity rating is based on clinical judgment and the FOTO Score score.  
 
RECOMMENDATIONS: 
 [x]Discontinue therapy: [x]Patient has reached or is progressing toward set goals []Patient is non-compliant or has abdicated 
   []Due to lack of appreciable progress towards set goals Orion Odonnell 1/3/2019 2:41 PM

## 2019-10-02 ENCOUNTER — OFFICE VISIT (OUTPATIENT)
Dept: INTERNAL MEDICINE CLINIC | Age: 69
End: 2019-10-02

## 2019-10-02 VITALS
BODY MASS INDEX: 24.73 KG/M2 | WEIGHT: 167 LBS | OXYGEN SATURATION: 98 % | HEART RATE: 71 BPM | TEMPERATURE: 98.4 F | SYSTOLIC BLOOD PRESSURE: 135 MMHG | RESPIRATION RATE: 16 BRPM | HEIGHT: 69 IN | DIASTOLIC BLOOD PRESSURE: 81 MMHG

## 2019-10-02 DIAGNOSIS — Z12.5 SPECIAL SCREENING FOR MALIGNANT NEOPLASM OF PROSTATE: ICD-10-CM

## 2019-10-02 DIAGNOSIS — M72.0 DUPUYTREN CONTRACTURE: ICD-10-CM

## 2019-10-02 DIAGNOSIS — Z00.00 MEDICARE ANNUAL WELLNESS VISIT, SUBSEQUENT: Primary | ICD-10-CM

## 2019-10-02 DIAGNOSIS — Z13.31 SCREENING FOR DEPRESSION: ICD-10-CM

## 2019-10-02 DIAGNOSIS — Z13.39 SCREENING FOR ALCOHOLISM: ICD-10-CM

## 2019-10-02 DIAGNOSIS — Z23 ENCOUNTER FOR IMMUNIZATION: ICD-10-CM

## 2019-10-02 DIAGNOSIS — R22.32 MASS OF LEFT HAND: ICD-10-CM

## 2019-10-02 DIAGNOSIS — Z13.6 SCREENING FOR ISCHEMIC HEART DISEASE: ICD-10-CM

## 2019-10-02 NOTE — PROGRESS NOTES
Chief Complaint   Patient presents with    Mass    Annual Wellness Visit    Joint Pain     he is a 71y.o. year old male who presents for evaluation of bump in the middle of the left hand. Patient states that it has been there for about 4 months. Patient states that he was long on the yard and noticed a lump on his hand the day after. Feels like it has gotten better in this past week and he decided to get it checked out. Denies any pain but does state that it is restrict his motion    Reviewed and agree with Nurse Note and duplicated in this note. Reviewed PmHx, RxHx, FmHx, SocHx, AllgHx and updated and dated in the chart. History reviewed. No pertinent family history. History reviewed. No pertinent past medical history. Social History     Socioeconomic History    Marital status:      Spouse name: Not on file    Number of children: Not on file    Years of education: Not on file    Highest education level: Not on file   Tobacco Use    Smoking status: Current Some Day Smoker     Types: Cigars    Smokeless tobacco: Current User   Substance and Sexual Activity    Alcohol use: Yes     Alcohol/week: 2.0 standard drinks     Types: 2 Glasses of wine per week    Drug use: No    Sexual activity: Yes        Review of Systems - negative except as listed above      Objective: There were no vitals filed for this visit.     Physical Examination: General appearance - alert, well appearing, and in no distress  Back exam - full range of motion, no tenderness, palpable spasm or pain on motion  Neurological - alert, oriented, normal speech, no focal findings or movement disorder noted  Musculoskeletal - no joint tenderness, deformity or swelling  Extremities -left handpain with palpation of fourth metacarpal and with 0.5 cm x 25 cm mass non-mobile towards distal metacarpal  Skin - normal coloration and turgor, no rashes, no suspicious skin lesions noted    Assessment/ Plan:   Diagnoses and all orders for this visit:    1. Medicare annual wellness visit, subsequent  -     [place] ANNUAL ALCOHOL SCREEN 15 MIN    2. Screening for alcoholism  -     [place] ANNUAL ALCOHOL SCREEN 15 MIN    3. Screening for depression  -     DEPRESSION SCREEN ANNUAL    4. Screening for ischemic heart disease  -     LIPID PANEL    5. Encounter for immunization  -     INFLUENZA VIRUS VACCINE, HIGH DOSE SEASONAL, PRESERVATIVE FREE  -     [place] IMMUNIZ ADMIN,1 SINGLE/COMB VAC/TOXOID    6. Mass of left hand  -     XR HAND LT MIN 3 V; Future  -     MRI HAND LT WO CONT; Future    7. Dupuytren contracture    8. Special screening for malignant neoplasm of prostate  -     PSA SCREENING (SCREENING)    Likely due to Dupuytren's contracture but due to the acute nature and onset will get MRI to rule out mass      I have reviewed/discussed the above normal BMI with the patient. I have recommended the following interventions: dietary management education, guidance, and counseling . I have discussed the diagnosis with the patient and the intended plan as seen in the above orders. The patient has received an after-visit summary and questions were answered concerning future plans. Medication Side Effects and Warnings were discussed with patient,  Patient Labs were reviewed and or requested, and  Patient Past Records were reviewed and or requested  yes       Pt agrees to call or return to clinic and/or go to closest ER with any worsening of symptoms. This may include, but not limited to increased fever (>100.4) with NSAIDS or Tylenol, increased edema, confusion, rash, worsening of presenting symptoms. This is the Subsequent Medicare Annual Wellness Exam, performed 12 months or more after the Initial AWV or the last Subsequent AWV    I have reviewed the patient's medical history in detail and updated the computerized patient record. History   History reviewed. No pertinent past medical history.    Past Surgical History:   Procedure Laterality Date    HX ORTHOPAEDIC       Current Outpatient Medications   Medication Sig Dispense Refill    TRAVATAN Z 0.004 % ophthalmic solution INSTILL 1 DROP INTO BOTH EYES IN THE EVENING  6    fluticasone (FLONASE) 50 mcg/actuation nasal spray 2 Sprays by Both Nostrils route daily.  efinaconazole (JUBLIA) erlinda topical solution Apply  to affected area daily.  Cholecalciferol, Vitamin D3, (VITAMIN D3) 2,000 unit cap capsule Take  by mouth two (2) times a day.  omega-3 fatty acids-vitamin e (FISH OIL) 1,000 mg cap Take 1 Cap by mouth.  ascorbic acid, vitamin C, (VITAMIN C) 500 mg tablet Take 1,000 mg by mouth.  glucosamine-chondroitin (ELATION) 1,500-1,200 mg/30 mL liqd Take  by mouth. No Known Allergies  History reviewed. No pertinent family history. Social History     Tobacco Use    Smoking status: Current Some Day Smoker     Types: Cigars    Smokeless tobacco: Current User   Substance Use Topics    Alcohol use: Yes     Alcohol/week: 2.0 standard drinks     Types: 2 Glasses of wine per week     Patient Active Problem List   Diagnosis Code    Hyperlipidemia E78.5    ACP (advance care planning) Z71.89       Depression Risk Factor Screening:     3 most recent PHQ Screens 10/2/2019   Little interest or pleasure in doing things Not at all   Feeling down, depressed, irritable, or hopeless Not at all   Total Score PHQ 2 0     Alcohol Risk Factor Screening: You do not drink alcohol or very rarely. Functional Ability and Level of Safety:   Hearing Loss  Hearing is good. Activities of Daily Living  The home contains: no safety equipment. Patient does total self care    Fall Risk  Fall Risk Assessment, last 12 mths 10/2/2019   Able to walk? Yes   Fall in past 12 months?  No       Abuse Screen  Patient is not abused    Cognitive Screening   Evaluation of Cognitive Function:  Has your family/caregiver stated any concerns about your memory: no  Normal    Patient Care Team   Patient Care Team:  Willis Sanju Begum MD as PCP - General (Family Practice)    Assessment/Plan   Education and counseling provided:  Are appropriate based on today's review and evaluation    Diagnoses and all orders for this visit:    1. Encounter for immunization  -     INFLUENZA VIRUS VACCINE, HIGH DOSE SEASONAL, PRESERVATIVE FREE  -     [place] IMMUNIZ ADMIN,1 SINGLE/COMB VAC/TOXOID    2. Medicare annual wellness visit, subsequent  -     [place] ANNUAL ALCOHOL SCREEN 15 MIN    3. Screening for alcoholism  -     [place] ANNUAL ALCOHOL SCREEN 15 MIN    4. Screening for depression  -     DEPRESSION SCREEN ANNUAL    5.  Screening for ischemic heart disease  -     LIPID PANEL    6. Mass of left hand  -     XR HAND LT MIN 3 V; Future  -     MRI HAND LT WO CONT; Future        Health Maintenance Due   Topic Date Due    GLAUCOMA SCREENING Q2Y  09/14/2018    MEDICARE YEARLY EXAM  07/24/2019    FOBT Q 1 YEAR AGE 50-75  07/30/2019    Influenza Age 9 to Adult  08/01/2019

## 2019-10-02 NOTE — ACP (ADVANCE CARE PLANNING)
Advance Care Planning (ACP) Provider Conversation Snapshot    Date of ACP Conversation: 10/02/19  Persons included in Conversation:  patient  Length of ACP Conversation in minutes:  <16 minutes (Non-Billable)    Authorized Decision Maker (if patient is incapable of making informed decisions):    This person is:   Healthcare Agent/Medical Power of  under Advance Directive            For Patients with Decision Making Capacity:   Values/Goals: Exploration of values, goals, and preferences if recovery is not expected, even with continued medical treatment in the event of:  Imminent death  Severe, permanent brain injury    Conversation Outcomes / Follow-Up Plan:   Recommended completion of Advance Directive form after review of ACP materials and conversation with prospective healthcare agent

## 2019-10-02 NOTE — PATIENT INSTRUCTIONS
Medicare Wellness Visit, Male The best way to live healthy is to have a lifestyle where you eat a well-balanced diet, exercise regularly, limit alcohol use, and quit all forms of tobacco/nicotine, if applicable. Regular preventive services are another way to keep healthy. Preventive services (vaccines, screening tests, monitoring & exams) can help personalize your care plan, which helps you manage your own care. Screening tests can find health problems at the earliest stages, when they are easiest to treat. [address] [facility] follows the current, evidence-based guidelines published by the [facility] Guicho Heber ([facility]) when recommending preventive services for our patients. Because we follow these guidelines, sometimes recommendations change over time as research supports it. (For example, a prostate screening blood test is no longer routinely recommended for men with no symptoms.) Of course, you and your doctor may decide to screen more often for some diseases, based on your risk and co-morbidities (chronic disease you are already diagnosed with). Preventive services for you include: - Medicare offers their members a free annual wellness visit, which is time for you and your primary care provider to discuss and plan for your preventive service needs. Take advantage of this benefit every year! 
-All adults over age 72 should receive the recommended pneumonia vaccines. Current USPSTF guidelines recommend a series of two vaccines for the best pneumonia protection.  
-All adults should have a flu vaccine yearly and an ECG.  All adults age 61 and older should receive a shingles vaccine once in their lifetime.   
-All adults age 38-68 who are overweight should have a diabetes screening test once every three years.  
-Other screening tests & preventive services for persons with diabetes include: an eye exam to screen for diabetic retinopathy, a kidney function test, a foot exam, and stricter control over your cholesterol.  
-Cardiovascular screening for adults with routine risk involves an electrocardiogram (ECG) at intervals determined by the provider.  
-Colorectal cancer screening should be done for adults age 54-65 with no increased risk factors for colorectal cancer. There are a number of acceptable methods of screening for this type of cancer. Each test has its own benefits and drawbacks. Discuss with your provider what is most appropriate for you during your annual wellness visit. The different tests include: colonoscopy (considered the best screening method), a fecal occult blood test, a fecal DNA test, and sigmoidoscopy. 
-All adults born between [facility] should be screened once for Hepatitis C. 
-An Abdominal Aortic Aneurysm (AAA) Screening is recommended for men age 73-68 who has ever smoked in their lifetime. Here is a list of your current Health Maintenance items (your personalized list of preventive services) with a due date: 
Health Maintenance Due Topic Date Due  Glaucoma Screening   09/14/2018 [facility] Annual Well Visit  07/24/2019  Stool testing for trace blood  07/30/2019  Flu Vaccine  08/01/2019

## 2019-10-04 LAB
CHOLEST SERPL-MCNC: NORMAL MG/DL
HDLC SERPL-MCNC: NORMAL MG/DL
PSA SERPL-MCNC: 1 NG/ML (ref 0–4)
TRIGL SERPL-MCNC: NORMAL MG/DL (ref ?–150)

## 2019-10-07 DIAGNOSIS — Z13.6 SCREENING FOR ISCHEMIC HEART DISEASE: Primary | ICD-10-CM

## 2019-10-07 NOTE — PROGRESS NOTES
Your prostate markers were normal.  There is no enough quantity to do your cholesterol panel, please return to clinic for a repeat lab draw at your convenience

## 2019-10-11 ENCOUNTER — HOSPITAL ENCOUNTER (OUTPATIENT)
Dept: MRI IMAGING | Age: 69
Discharge: HOME | End: 2019-10-11
Attending: FAMILY MEDICINE
Payer: MEDICARE

## 2019-10-11 DIAGNOSIS — R22.32 MASS OF LEFT HAND: ICD-10-CM

## 2019-10-11 PROCEDURE — 73218 MRI UPPER EXTREMITY W/O DYE: CPT

## 2019-10-11 NOTE — PROGRESS NOTES
Please notify patient, MRI shows mass consistent with Dupuytren contracture, if mass is painful or worsening then we would think about a steroid injection.

## 2020-08-20 ENCOUNTER — OFFICE VISIT (OUTPATIENT)
Dept: INTERNAL MEDICINE CLINIC | Age: 70
End: 2020-08-20
Payer: MEDICARE

## 2020-08-20 VITALS
RESPIRATION RATE: 14 BRPM | TEMPERATURE: 98.2 F | HEART RATE: 97 BPM | BODY MASS INDEX: 24.66 KG/M2 | OXYGEN SATURATION: 97 % | DIASTOLIC BLOOD PRESSURE: 77 MMHG | HEIGHT: 69 IN | SYSTOLIC BLOOD PRESSURE: 137 MMHG | WEIGHT: 166.5 LBS

## 2020-08-20 DIAGNOSIS — M25.50 MULTIPLE JOINT PAIN: ICD-10-CM

## 2020-08-20 DIAGNOSIS — M79.641 RIGHT HAND PAIN: Primary | ICD-10-CM

## 2020-08-20 PROCEDURE — 99214 OFFICE O/P EST MOD 30 MIN: CPT | Performed by: FAMILY MEDICINE

## 2020-08-20 PROCEDURE — G8427 DOCREV CUR MEDS BY ELIG CLIN: HCPCS | Performed by: FAMILY MEDICINE

## 2020-08-20 PROCEDURE — 73130 X-RAY EXAM OF HAND: CPT | Performed by: FAMILY MEDICINE

## 2020-08-20 PROCEDURE — 3017F COLORECTAL CA SCREEN DOC REV: CPT | Performed by: FAMILY MEDICINE

## 2020-08-20 PROCEDURE — 1101F PT FALLS ASSESS-DOCD LE1/YR: CPT | Performed by: FAMILY MEDICINE

## 2020-08-20 PROCEDURE — G8420 CALC BMI NORM PARAMETERS: HCPCS | Performed by: FAMILY MEDICINE

## 2020-08-20 PROCEDURE — G0444 DEPRESSION SCREEN ANNUAL: HCPCS | Performed by: FAMILY MEDICINE

## 2020-08-20 PROCEDURE — G8536 NO DOC ELDER MAL SCRN: HCPCS | Performed by: FAMILY MEDICINE

## 2020-08-20 PROCEDURE — G8510 SCR DEP NEG, NO PLAN REQD: HCPCS | Performed by: FAMILY MEDICINE

## 2020-08-20 NOTE — PROGRESS NOTES
No chief complaint on file. he is a 79y.o. year old male who presents for evaluation of joint pain in hands, states that he has had episodes of all joint pain about 2 weeks ago. Patient states that currently he has 0 pain and everything is resolved. Pain was worse in the early mornings got better with daily activity  Pain Assessment Encounter      Ana Hunter Sr.  8/20/2020  Onset of Symptoms: years  ________________________________________________________________________  Description:pain in hands when balling a fist, not as active as before. Frequency: 4 times a day  Pain Scale:(1-10): 1  Trauma Hx: no known injury  Hx of similar symptoms: No  Radiation: NO  Duration:  intermittent      Progression: is unchanged  What makes it better?: OTC meds and rest  What makes it worse?:exercise and strecthing  Medications tried: acetaminophen, ibuprofen    Reviewed and agree with Nurse Note and duplicated in this note. Reviewed PmHx, RxHx, FmHx, SocHx, AllgHx and updated and dated in the chart. No family history on file. No past medical history on file. Social History     Socioeconomic History    Marital status:      Spouse name: Not on file    Number of children: Not on file    Years of education: Not on file    Highest education level: Not on file   Tobacco Use    Smoking status: Current Some Day Smoker     Types: Cigars    Smokeless tobacco: Current User   Substance and Sexual Activity    Alcohol use:  Yes     Alcohol/week: 2.0 standard drinks     Types: 2 Glasses of wine per week    Drug use: No    Sexual activity: Yes        Review of Systems - negative except as listed above      Objective:     Vitals:    08/20/20 1100   BP: 137/77   Pulse: 97   Resp: 14   Temp: 98.2 °F (36.8 °C)   TempSrc: Oral   SpO2: 97%   Weight: 166 lb 8 oz (75.5 kg)   Height: 5' 9\" (1.753 m)       Physical Examination: General appearance - alert, well appearing, and in no distress  Back exam - full range of motion, no tenderness, palpable spasm or pain on motion  Neurological - alert, oriented, normal speech, no focal findings or movement disorder noted  Musculoskeletal -bilateral hands, no deformity noted, no pain with palpation and inability to flex and extend fingers is normal  Extremities - peripheral pulses normal, no pedal edema, no clubbing or cyanosis  Skin - normal coloration and turgor, no rashes, no suspicious skin lesions noted     Assessment/ Plan:   Diagnoses and all orders for this visit:    1. Right hand pain  -     XR HAND RT MIN 3 V; Future  -     RHEUMASSURE    2. Multiple joint pain  -     XR HAND RT MIN 3 V; Future  -     RHEUMASSURE    Will need to rule out rheumatoid arthritis as patient states it was multiple joint pain which includes his knees ankles and bilateral hands. If single joint pain occurs he will return for evaluation    Pathophysiology, recovery and rehabilitation process discussed and questions answered   Counseling for 30 Minutes of the total visit duration   Pictures and figures used as necessary   Provided reassurance   Monitor response to Physical Therapy              1) Remember to stay active and/or exercise regularly (I suggest 30-45 minutes daily)   2) For reliable dietary information, go to www. EATRIGHT.org. You may wish to consider seeing the nutritionist at [facility] [phone], also consider the [address] I have discussed the diagnosis with the patient and the intended plan as seen in the above orders. The patient has received an after-visit summary and questions were answered concerning future plans. Medication Side Effects and Warnings were discussed with patient,  Patient Labs were reviewed and or requested, and  Patient Past Records were reviewed and or requested  yes      Pt agrees to call or return to clinic and/or go to closest ER with any worsening of symptoms.   This may include, but not limited to increased fever (>100.4) with NSAIDS or Tylenol, increased edema, confusion, rash, worsening of presenting symptoms. Please note that this dictation was completed with Skigit, the computer voice recognition software. Quite often unanticipated grammatical, syntax, homophones, and other interpretive errors are inadvertently transcribed by the computer software. Please disregard these errors. Please excuse any errors that have escaped final proofreading. Thank you.

## 2020-08-30 LAB
14.3.3 ETA, RHEUM. ARTHRITIS: <0.2 NG/ML
CCP IGA+IGG SERPL IA-ACNC: <20 UNITS
RHEUMATOID FACT SERPL-ACNC: <14 UNITS/ML

## 2020-10-08 ENCOUNTER — OFFICE VISIT (OUTPATIENT)
Dept: INTERNAL MEDICINE CLINIC | Age: 70
End: 2020-10-08
Payer: MEDICARE

## 2020-10-08 VITALS
OXYGEN SATURATION: 96 % | WEIGHT: 167 LBS | DIASTOLIC BLOOD PRESSURE: 78 MMHG | SYSTOLIC BLOOD PRESSURE: 143 MMHG | HEART RATE: 68 BPM | BODY MASS INDEX: 24.73 KG/M2 | RESPIRATION RATE: 16 BRPM | HEIGHT: 69 IN

## 2020-10-08 DIAGNOSIS — Z13.39 SCREENING FOR ALCOHOLISM: ICD-10-CM

## 2020-10-08 DIAGNOSIS — M25.571 CHRONIC PAIN OF BOTH ANKLES: ICD-10-CM

## 2020-10-08 DIAGNOSIS — M25.572 CHRONIC PAIN OF BOTH ANKLES: ICD-10-CM

## 2020-10-08 DIAGNOSIS — M54.2 NECK PAIN: ICD-10-CM

## 2020-10-08 DIAGNOSIS — Z13.6 SCREENING FOR ISCHEMIC HEART DISEASE: ICD-10-CM

## 2020-10-08 DIAGNOSIS — Z12.11 SCREEN FOR COLON CANCER: ICD-10-CM

## 2020-10-08 DIAGNOSIS — G89.29 CHRONIC PAIN OF BOTH ANKLES: ICD-10-CM

## 2020-10-08 DIAGNOSIS — Z13.31 SCREENING FOR DEPRESSION: ICD-10-CM

## 2020-10-08 DIAGNOSIS — Z23 NEEDS FLU SHOT: ICD-10-CM

## 2020-10-08 DIAGNOSIS — Z12.5 SPECIAL SCREENING FOR MALIGNANT NEOPLASM OF PROSTATE: ICD-10-CM

## 2020-10-08 DIAGNOSIS — M54.50 CHRONIC MIDLINE LOW BACK PAIN WITHOUT SCIATICA: ICD-10-CM

## 2020-10-08 DIAGNOSIS — Z00.00 MEDICARE ANNUAL WELLNESS VISIT, SUBSEQUENT: Primary | ICD-10-CM

## 2020-10-08 DIAGNOSIS — G89.29 CHRONIC MIDLINE LOW BACK PAIN WITHOUT SCIATICA: ICD-10-CM

## 2020-10-08 PROCEDURE — 99214 OFFICE O/P EST MOD 30 MIN: CPT | Performed by: FAMILY MEDICINE

## 2020-10-08 PROCEDURE — G8427 DOCREV CUR MEDS BY ELIG CLIN: HCPCS | Performed by: FAMILY MEDICINE

## 2020-10-08 PROCEDURE — G8420 CALC BMI NORM PARAMETERS: HCPCS | Performed by: FAMILY MEDICINE

## 2020-10-08 PROCEDURE — G0439 PPPS, SUBSEQ VISIT: HCPCS | Performed by: FAMILY MEDICINE

## 2020-10-08 PROCEDURE — 1101F PT FALLS ASSESS-DOCD LE1/YR: CPT | Performed by: FAMILY MEDICINE

## 2020-10-08 PROCEDURE — G8536 NO DOC ELDER MAL SCRN: HCPCS | Performed by: FAMILY MEDICINE

## 2020-10-08 PROCEDURE — 3017F COLORECTAL CA SCREEN DOC REV: CPT | Performed by: FAMILY MEDICINE

## 2020-10-08 PROCEDURE — G8510 SCR DEP NEG, NO PLAN REQD: HCPCS | Performed by: FAMILY MEDICINE

## 2020-10-08 NOTE — PATIENT INSTRUCTIONS
Medicare Wellness Visit, Male    The best way to live healthy is to have a lifestyle where you eat a well-balanced diet, exercise regularly, limit alcohol use, and quit all forms of tobacco/nicotine, if applicable. Regular preventive services are another way to keep healthy. Preventive services (vaccines, screening tests, monitoring & exams) can help personalize your care plan, which helps you manage your own care. Screening tests can find health problems at the earliest stages, when they are easiest to treat. [facility] follows the current, evidence-based guidelines published by the [facility] Guicho Heber ([facility]) when recommending preventive services for our patients. Because we follow these guidelines, sometimes recommendations change over time as research supports it. (For example, a prostate screening blood test is no longer routinely recommended for men with no symptoms). Of course, you and your doctor may decide to screen more often for some diseases, based on your risk and co-morbidities (chronic disease you are already diagnosed with). Preventive services for you include:  - Medicare offers their members a free annual wellness visit, which is time for you and your primary care provider to discuss and plan for your preventive service needs. Take advantage of this benefit every year!  -All adults over age 72 should receive the recommended pneumonia vaccines. Current USPSTF guidelines recommend a series of two vaccines for the best pneumonia protection.   -All adults should have a flu vaccine yearly and tetanus vaccine every 10 years.  -All adults age 48 and older should receive the shingles vaccines (series of two vaccines).        -All adults age 38-68 who are overweight should have a diabetes screening test once every three years.   -Other screening tests & preventive services for persons with diabetes include: an eye exam to screen for diabetic retinopathy, a kidney function test, a foot exam, and stricter control over your cholesterol.   -Cardiovascular screening for adults with routine risk involves an electrocardiogram (ECG) at intervals determined by the provider.   -Colorectal cancer screening should be done for adults age 54-65 with no increased risk factors for colorectal cancer. There are a number of acceptable methods of screening for this type of cancer. Each test has its own benefits and drawbacks. Discuss with your provider what is most appropriate for you during your annual wellness visit. The different tests include: colonoscopy (considered the best screening method), a fecal occult blood test, a fecal DNA test, and sigmoidoscopy.  -All adults born between [facility] should be screened once for Hepatitis C.  -An Abdominal Aortic Aneurysm (AAA) Screening is recommended for men age 73-68 who has ever smoked in their lifetime. Here is a list of your current Health Maintenance items (your personalized list of preventive services) with a due date:  Health Maintenance Due   Topic Date Due    Pneumococcal Vaccine (2 of 2 - PPSV23) 03/01/2018    Colon Cancer Stool Test  07/30/2019    Yearly Flu Vaccine (1) 09/01/2020    Annual Well Visit  10/02/2020     Medicare Wellness Visit, Male    The best way to live healthy is to have a lifestyle where you eat a well-balanced diet, exercise regularly, limit alcohol use, and quit all forms of tobacco/nicotine, if applicable. Regular preventive services are another way to keep healthy. Preventive services (vaccines, screening tests, monitoring & exams) can help personalize your care plan, which helps you manage your own care. Screening tests can find health problems at the earliest stages, when they are easiest to treat.    [facility] follows the current, evidence-based guidelines published by the [place] Guicho Heber ([facility]F) when recommending preventive services for our patients. Because we follow these guidelines, sometimes recommendations change over time as research supports it. (For example, a prostate screening blood test is no longer routinely recommended for men with no symptoms). Of course, you and your doctor may decide to screen more often for some diseases, based on your risk and co-morbidities (chronic disease you are already diagnosed with). Preventive services for you include:  - Medicare offers their members a free annual wellness visit, which is time for you and your primary care provider to discuss and plan for your preventive service needs. Take advantage of this benefit every year!  -All adults over age 72 should receive the recommended pneumonia vaccines. Current USPSTF guidelines recommend a series of two vaccines for the best pneumonia protection.   -All adults should have a flu vaccine yearly and tetanus vaccine every 10 years.  -All adults age 48 and older should receive the shingles vaccines (series of two vaccines). -All adults age 38-68 who are overweight should have a diabetes screening test once every three years.   -Other screening tests & preventive services for persons with diabetes include: an eye exam to screen for diabetic retinopathy, a kidney function test, a foot exam, and stricter control over your cholesterol.   -Cardiovascular screening for adults with routine risk involves an electrocardiogram (ECG) at intervals determined by the provider.   -Colorectal cancer screening should be done for adults age 54-65 with no increased risk factors for colorectal cancer. There are a number of acceptable methods of screening for this type of cancer. Each test has its own benefits and drawbacks. Discuss with your provider what is most appropriate for you during your annual wellness visit.  The different tests include: colonoscopy (considered the best screening method), a fecal occult blood test, a fecal DNA test, and sigmoidoscopy.  -All adults born between 1945 and 1965 should be screened once for Hepatitis C.  -An Abdominal Aortic Aneurysm (AAA) Screening is recommended for men age 73-68 who has ever smoked in their lifetime. Here is a list of your current Health Maintenance items (your personalized list of preventive services) with a due date:  Health Maintenance Due   Topic Date Due    Pneumococcal Vaccine (2 of 2 - PPSV23) 03/01/2018    Colon Cancer Stool Test  07/30/2019    Yearly Flu Vaccine (1) 09/01/2020    Annual Well Visit  10/02/2020     Vaccine Information Statement    Influenza (Flu) Vaccine (Inactivated or Recombinant): What You Need to Know    Many Vaccine Information Statements are available in Brazilian and other languages. See www.immunize.org/vis  Hojas de información sobre vacunas están disponibles en español y en muchos otros idiomas. Visite www.immunize.org/vis    1. Why get vaccinated? Influenza vaccine can prevent influenza (flu). Flu is a contagious disease that spreads around the [facility] every year, usually between October and May. Anyone can get the flu, but it is more dangerous for some people. Infants and young children, people 72years of age and older, pregnant women, and people with certain health conditions or a weakened immune system are at greatest risk of flu complications. Pneumonia, bronchitis, sinus infections and ear infections are examples of flu-related complications. If you have a medical condition, such as heart disease, cancer or diabetes, flu can make it worse. Flu can cause fever and chills, sore throat, muscle aches, fatigue, cough, headache, and runny or stuffy nose. Some people may have vomiting and diarrhea, though this is more common in children than adults. Each year thousands of people in the [facility] die from flu, and many more are hospitalized. Flu vaccine prevents millions of illnesses and flu-related visits to the doctor each year.     2. Influenza vaccines     CDC recommends everyone 10months of age and older get vaccinated every flu season. Children 6 months through 6years of age may need 2 doses during a single flu season. Everyone else needs only 1 dose each flu season. It takes about 2 weeks for protection to develop after vaccination. There are many flu viruses, and they are always changing. Each year a new flu vaccine is made to protect against three or four viruses that are likely to cause disease in the upcoming flu season. Even when the vaccine doesnt exactly match these viruses, it may still provide some protection. Influenza vaccine does not cause flu. Influenza vaccine may be given at the same time as other vaccines. 3. Talk with your health care provider    Tell your vaccine provider if the person getting the vaccine:   Has had an allergic reaction after a previous dose of influenza vaccine, or has any severe, life-threatening allergies.  Has ever had Guillain-Barré Syndrome (also called GBS). In some cases, your health care provider may decide to postpone influenza vaccination to a future visit. People with minor illnesses, such as a cold, may be vaccinated. People who are moderately or severely ill should usually wait until they recover before getting influenza vaccine. Your health care provider can give you more information. 4. Risks of a reaction     Soreness, redness, and swelling where shot is given, fever, muscle aches, and headache can happen after influenza vaccine.  There may be a very small increased risk of Guillain-Barré Syndrome (GBS) after inactivated influenza vaccine (the flu shot). [facility] children who get the flu shot along with pneumococcal vaccine (PCV13), and/or DTaP vaccine at the same time might be slightly more likely to have a seizure caused by fever. Tell your health care provider if a child who is getting flu vaccine has ever had a seizure.     People sometimes faint after medical procedures, including vaccination. Tell your provider if you feel dizzy or have vision changes or ringing in the ears. As with any medicine, there is a very remote chance of a vaccine causing a severe allergic reaction, other serious injury, or death. 5. What if there is a serious problem? An allergic reaction could occur after the vaccinated person leaves the clinic. If you see signs of a severe allergic reaction (hives, swelling of the face and throat, difficulty breathing, a fast heartbeat, dizziness, or weakness), call 9-1-1 and get the person to the nearest hospital.    For other signs that concern you, call your health care provider. Adverse reactions should be reported to the Vaccine Adverse Event Reporting System (VAERS). Your health care provider will usually file this report, or you can do it yourself. Visit the VAERS website at www.vaers. hhs.gov or call 9-792.591.6549. VAERS is only for reporting reactions, and VAERS staff do not give medical advice. 6. The National Vaccine Injury Compensation Program    The [facility] Vaccine Injury Compensation Program (VICP) is a federal program that was created to compensate people who may have been injured by certain vaccines. Visit the VICP website at www.hrsa.gov/vaccinecompensation or call 8-701.454.8159 to learn about the program and about filing a claim. There is a time limit to file a claim for compensation. 7. How can I learn more?  Ask your health care provider.  Call your local or state health department.  Contact the Centers for Disease Control and Prevention (CDC):  - Call 8-213.181.4596 (1-800-CDC-INFO) or  - Visit CDCs influenza website at www.cdc.gov/flu    Vaccine Information Statement (Interim)  Inactivated Influenza Vaccine   8/15/2019  42 UAndres Lisa Primer [id number]   Department of Health and Human Services  Centers for Disease Control and Prevention    Office Use Only

## 2020-10-08 NOTE — PROGRESS NOTES
This is the Subsequent Medicare Annual Wellness Exam, performed 12 months or more after the Initial AWV or the last Subsequent AWV    I have reviewed the patient's medical history in detail and updated the computerized patient record. History   Complete Physical Exam Questions:    1. Do you follow a low fat diet? yes  2. Are you up to date on your Tdap (<10 years)? Unknown  3. Have you ever had a Pneumovax vaccine (>65)? Yes   PCV13 Unknown   PPSV23 Unknown  4. Have you had Zoster vaccine (>60)? Unknown  5. Have you had the HPV - Gardasil (13- 26)? Not applicable  6. Do you follow an exercise program?  yes  7. Do you smoke?  no If > 65 and smoker, have you had a abdominal aortic aneurysm ultrasound screen? No  8. Do you consider yourself overweight?  no  9. Is there a family history of CAD< age 48? Unknown  10. Is there a family history of Cancer? Yes  11. Do you know your Cancer risks? Yes  12. Have you had a colonoscopy? Yes  13. Have you been tested for HIV or other STI's? No HIV today(18-64 y/o)? No   14. Have you had an EKG in the last five years(>50)? Yes  15. Have you had a PSA test done this year (50-69)? Yes  Patient Active Problem List   Diagnosis Code    Hyperlipidemia E78.5    ACP (advance care planning) Z71.89     History reviewed. No pertinent past medical history. Past Surgical History:   Procedure Laterality Date    HX ORTHOPAEDIC       Current Outpatient Medications   Medication Sig Dispense Refill    TRAVATAN Z 0.004 % ophthalmic solution INSTILL 1 DROP INTO BOTH EYES IN THE EVENING  6    fluticasone (FLONASE) 50 mcg/actuation nasal spray 2 Sprays by Both Nostrils route daily.  efinaconazole (JUBLIA) erlinda topical solution Apply  to affected area daily.  Cholecalciferol, Vitamin D3, (VITAMIN D3) 2,000 unit cap capsule Take  by mouth two (2) times a day.  omega-3 fatty acids-vitamin e (FISH OIL) 1,000 mg cap Take 1 Cap by mouth.       ascorbic acid, vitamin C, (VITAMIN C) 500 mg tablet Take 1,000 mg by mouth.  glucosamine-chondroitin (ELATION) 1,500-1,200 mg/30 mL liqd Take  by mouth. No Known Allergies    History reviewed. No pertinent family history. Social History     Tobacco Use    Smoking status: Current Some Day Smoker     Types: Cigars    Smokeless tobacco: Current User   Substance Use Topics    Alcohol use: Yes     Alcohol/week: 2.0 standard drinks     Types: 2 Glasses of wine per week       Depression Risk Factor Screening:     3 most recent PHQ Screens 10/8/2020   Little interest or pleasure in doing things Not at all   Feeling down, depressed, irritable, or hopeless Not at all   Total Score PHQ 2 0       Alcohol Risk Screen   Do you average more than 1 drink per night or more than 7 drinks a week: No    In the past three months have you have had more than 4 drinks containing alcohol on one occasion: No        Functional Ability and Level of Safety:   Hearing: Hearing is good. Activities of Daily Living: The home contains: no safety equipment. Patient does total self care     Ambulation: with no difficulty     Fall Risk:  Fall Risk Assessment, last 12 mths 10/8/2020   Able to walk? Yes   Fall in past 12 months? No     Abuse Screen:  Patient is not abused       Cognitive Screening   Has your family/caregiver stated any concerns about your memory: no     Cognitive Screening: Normal - MMSE (Mini Mental Status Exam)    Patient Care Team   Patient Care Team:  Elham Shannon MD as PCP - General (Family Medicine)  Elham Shannon MD as PCP - [facility]    Assessment/Plan   Education and counseling provided:  Are appropriate based on today's review and evaluation    Diagnoses and all orders for this visit:    1. Medicare annual wellness visit, subsequent    2. Screening for alcoholism    3. Screening for depression  -     DEPRESSION SCREEN ANNUAL    4.  Screen for colon cancer  -     COLOGUARD TEST (FECAL DNA COLORECTAL CANCER SCREENING)    5. Special screening for malignant neoplasm of prostate  -     PSA SCREENING (SCREENING)        Health Maintenance Due   Topic Date Due    Pneumococcal 65+ years (2 of 2 - PPSV23) 03/01/2018    FOBT Q1Y Age 50-75  07/30/2019    Flu Vaccine (1) 09/01/2020    Medicare Yearly Exam  10/02/2020         Chief Complaint   Patient presents with    Complete Physical     Patient is here for a wellness. he is a 79y.o. year old male who presents for follow-up of   Pain Assessment Encounter      Vlad Mcconnell Sr.  10/8/2020  Patient also has complaints of chronic upper neck and lower back pain. States that he also has wrist hand and bilateral ankle pain. Patient would like to focus on his back and his ankles today. Patient states that his ankles hurt when he takes the first few steps in the morning. He states that there is no one location of pain but really just all around his ankle. He denies any swelling or trauma history. This is been on for many years. Patient also states that he has many years of pain since discharge from the [facility] for lower back and upper neck pain. Patient denies any radiation of pain away from the back. Pain is about 1 out of 10 currently but can get up to about 5 out of 10. He has not gone through physical therapy  Patient like to refuse x-rays at this time as he would rather get an MRI. Explained to patient that we would need to go through physical therapy and also get x-rays in order for insurance to cover for MRIs. He like to start with physical therapy this time. Reviewed and agree with Nurse Note and duplicated in this note. Reviewed PmHx, RxHx, FmHx, SocHx, AllgHx and updated and dated in the chart. History reviewed. No pertinent family history. History reviewed. No pertinent past medical history.    Social History     Socioeconomic History    Marital status:      Spouse name: Not on file    Number of children: Not on file    Years of education: Not on file    Highest education level: Not on file   Tobacco Use    Smoking status: Current Some Day Smoker     Types: Cigars    Smokeless tobacco: Current User   Substance and Sexual Activity    Alcohol use: Yes     Alcohol/week: 2.0 standard drinks     Types: 2 Glasses of wine per week    Drug use: No    Sexual activity: Yes        Review of Systems - negative except as listed above      Objective:     Vitals:    10/08/20 0905   BP: (!) 143/78   Pulse: 68   Resp: 16   SpO2: 96%   Weight: 167 lb (75.8 kg)   Height: 5' 9\" (1.753 m)       Physical Examination: General appearance - alert, well appearing, and in no distress  Eyes - pupils equal and reactive, extraocular eye movements intact  Ears - bilateral TM's and external ear canals normal  Nose - normal and patent, no erythema, discharge or polyps  Mouth - mucous membranes moist, pharynx normal without lesions  Neck - supple, no significant adenopathy  Chest - clear to auscultation, no wheezes, rales or rhonchi, symmetric air entry  Abdomen - soft, nontender, nondistended, no masses or organomegaly   Male - no penile lesions or discharge, no testicular masses or tenderness, no hernias  Musculoskeletal - no joint tenderness, deformity or swelling  Extremities - peripheral pulses normal, no pedal edema, no clubbing or cyanosis     Assessment/ Plan:   Diagnoses and all orders for this visit:    1. Medicare annual wellness visit, subsequent    2. Screening for alcoholism    3. Screening for depression  -     DEPRESSION SCREEN ANNUAL    4. Screen for colon cancer  -     COLOGUARD TEST (FECAL DNA COLORECTAL CANCER SCREENING)    5. Special screening for malignant neoplasm of prostate  -     PSA SCREENING (SCREENING)    6. Screening for ischemic heart disease  -     LIPID PANEL    7.  Chronic midline low back pain without sciatica  -     REFERRAL TO PHYSICAL THERAPY    8. Neck pain  -     REFERRAL TO PHYSICAL THERAPY    9. Chronic pain of both ankles  - REFERRAL TO PHYSICAL THERAPY         Pathophysiology, recovery and rehabilitation process discussed and questions answered   Counseling for 30 Minutes of the total visit duration   Pictures and figures used as necessary   Provided reassurance   Monitor response to Physical Therapy   Recommend activity modification   Recommend  lower impact activities-walking, Eliptical, Nordic Track, cycling or swimming   Follow up in 4 week(s)             1) Remember to stay active and/or exercise regularly (I suggest 30-45 minutes daily)   2) For reliable dietary information, go to www. EAT[employer].org. You may wish to consider seeing the nutritionist at [facility] [phone], also consider the [id number] I have discussed the diagnosis with the patient and the intended plan as seen in the above orders. The patient has received an after-visit summary and questions were answered concerning future plans. Medication Side Effects and Warnings were discussed with patient,  Patient Labs were reviewed and or requested, and  Patient Past Records were reviewed and or requested  yes      Pt agrees to call or return to clinic and/or go to closest ER with any worsening of symptoms. This may include, but not limited to increased fever (>100.4) with NSAIDS or Tylenol, increased edema, confusion, rash, worsening of presenting symptoms. Please note that this dictation was completed with [employer], the computer voice recognition software. Quite often unanticipated grammatical, syntax, homophones, and other interpretive errors are inadvertently transcribed by the computer software. Please disregard these errors. Please excuse any errors that have escaped final proofreading. Thank you.

## 2020-10-09 PROCEDURE — 90653 IIV ADJUVANT VACCINE IM: CPT | Performed by: FAMILY MEDICINE

## 2020-10-09 PROCEDURE — G0008 ADMIN INFLUENZA VIRUS VAC: HCPCS | Performed by: FAMILY MEDICINE

## 2020-10-22 LAB — COLOGUARD TEST, EXTERNAL: NEGATIVE

## 2020-11-19 ENCOUNTER — OFFICE VISIT (OUTPATIENT)
Dept: INTERNAL MEDICINE CLINIC | Age: 70
End: 2020-11-19
Payer: MEDICARE

## 2020-11-19 VITALS
DIASTOLIC BLOOD PRESSURE: 75 MMHG | BODY MASS INDEX: 25.06 KG/M2 | OXYGEN SATURATION: 99 % | HEIGHT: 69 IN | SYSTOLIC BLOOD PRESSURE: 125 MMHG | WEIGHT: 169.2 LBS | HEART RATE: 68 BPM | TEMPERATURE: 98 F | RESPIRATION RATE: 14 BRPM

## 2020-11-19 DIAGNOSIS — G89.29 CHRONIC MIDLINE LOW BACK PAIN WITHOUT SCIATICA: ICD-10-CM

## 2020-11-19 DIAGNOSIS — M54.50 CHRONIC MIDLINE LOW BACK PAIN WITHOUT SCIATICA: ICD-10-CM

## 2020-11-19 DIAGNOSIS — M79.641 RIGHT HAND PAIN: Primary | ICD-10-CM

## 2020-11-19 LAB
CHOLEST SERPL-MCNC: 306 MG/DL
HDLC SERPL-MCNC: 72 MG/DL
HDLC SERPL: 4.3 {RATIO} (ref 0–5)
LDLC SERPL CALC-MCNC: 210.4 MG/DL (ref 0–100)
LIPID PROFILE,FLP: ABNORMAL
PSA SERPL-MCNC: 1 NG/ML (ref 0.01–4)
TRIGL SERPL-MCNC: 118 MG/DL (ref ?–150)
VLDLC SERPL CALC-MCNC: 23.6 MG/DL

## 2020-11-19 PROCEDURE — G8510 SCR DEP NEG, NO PLAN REQD: HCPCS | Performed by: FAMILY MEDICINE

## 2020-11-19 PROCEDURE — G8420 CALC BMI NORM PARAMETERS: HCPCS | Performed by: FAMILY MEDICINE

## 2020-11-19 PROCEDURE — 1101F PT FALLS ASSESS-DOCD LE1/YR: CPT | Performed by: FAMILY MEDICINE

## 2020-11-19 PROCEDURE — G8536 NO DOC ELDER MAL SCRN: HCPCS | Performed by: FAMILY MEDICINE

## 2020-11-19 PROCEDURE — 3017F COLORECTAL CA SCREEN DOC REV: CPT | Performed by: FAMILY MEDICINE

## 2020-11-19 PROCEDURE — G8427 DOCREV CUR MEDS BY ELIG CLIN: HCPCS | Performed by: FAMILY MEDICINE

## 2020-11-19 PROCEDURE — 99214 OFFICE O/P EST MOD 30 MIN: CPT | Performed by: FAMILY MEDICINE

## 2020-11-19 NOTE — PROGRESS NOTES
No chief complaint on file. he is a 79y.o. year old male who presents for follow up of injury. Follow Up Pain Assessment Encounter      Onset of Symptoms: years   ________________________________________________________________________  Description: Pain has improved      Pain Scale:(1-10): 1  Duration:  intermittent  Radiation: hands  What makes it better?: rest  What makes it worse?:nothing  Medications tried: acetaminophen, ibuprofen  Modalities tried: none    Patient is also following up for low back pain. Patient states that he has a hanging bar which helps to open up his spine and he has been doing core strengthening since his last visit. States his back pain has gotten much better. Pain is about 2 out of 10 with no radiation. Reviewed and agree with Nurse Note and duplicated in this note. Reviewed PmHx, RxHx, FmHx, SocHx, AllgHx and updated and dated in the chart. No family history on file. No past medical history on file. Social History     Socioeconomic History    Marital status:      Spouse name: Not on file    Number of children: Not on file    Years of education: Not on file    Highest education level: Not on file   Tobacco Use    Smoking status: Current Some Day Smoker     Types: Cigars    Smokeless tobacco: Current User   Substance and Sexual Activity    Alcohol use:  Yes     Alcohol/week: 2.0 standard drinks     Types: 2 Glasses of wine per week    Drug use: No    Sexual activity: Yes        Review of Systems - negative except as listed above      Objective:     Vitals:    11/19/20 0937   BP: 125/75   Pulse: 68   Resp: 14   Temp: 98 °F (36.7 °C)   TempSrc: Oral   SpO2: 99%   Weight: 169 lb 3.2 oz (76.7 kg)   Height: 5' 9\" (1.753 m)       Physical Examination: General appearance - alert, well appearing, and in no distress  Back exam - negative straight-leg raise bilaterally , normal reflexes and strength bilateral lower extremities  Neurological - alert, oriented, normal speech, no focal findings or movement disorder noted  Musculoskeletal -right handno pain with palpation of CMC joint or MCP joint of first digit, normal flexion extension and opposition of thumb  Extremities - peripheral pulses normal, no pedal edema, no clubbing or cyanosis  Skin - normal coloration and turgor, no rashes, no suspicious skin lesions noted     Assessment/ Plan:   Diagnoses and all orders for this visit:    1. Right hand pain  Resolved, likely CMC arthritis flareup  2. Chronic midline low back pain without sciatica    Patient will follow up with physical therapy for chronic back pain  Core flexibility exercises given to patient      Pathophysiology, recovery and rehabilitation process discussed and questions answered   Counseling for 30 Minutes of the total visit duration   Pictures and figures used as necessary   Provided reassurance   Recommend activity modification   Recommend  lower impact activities-walking, Eliptical, Nordic Track, cycling or swimming   Follow up in 4 week(s)              I have discussed the diagnosis with the patient and the intended plan as seen in the above orders. The patient has received an after-visit summary and questions were answered concerning future plans. Medication Side Effects and Warnings were discussed with patient,  Patient Labs were reviewed and or requested, and  Patient Past Records were reviewed and or requested  yes     Pt agrees to call or return to clinic and/or go to closest ER with any worsening of symptoms. This may include, but not limited to increased fever (>100.4) with NSAIDS or Tylenol, increased edema, confusion, rash, worsening of presenting symptoms. Please note that this dictation was completed with [employer], the computer voice recognition software. Quite often unanticipated grammatical, syntax, homophones, and other interpretive errors are inadvertently transcribed by the computer software. Please disregard these errors. Please excuse any errors that have escaped final proofreading. Thank you.

## 2020-11-20 RX ORDER — ROSUVASTATIN CALCIUM 5 MG/1
5 TABLET, COATED ORAL
Qty: 30 TAB | Refills: 3 | Status: SHIPPED | OUTPATIENT
Start: 2020-11-20 | End: 2021-10-15 | Stop reason: ALTCHOICE

## 2020-11-20 NOTE — PROGRESS NOTES
The last 2 cholesterol the readings have been very high. We should start cholesterol medication and repeat this in 3 months.   I will send this to your pharmacy

## 2021-10-14 ENCOUNTER — OFFICE VISIT (OUTPATIENT)
Dept: INTERNAL MEDICINE CLINIC | Age: 71
End: 2021-10-14
Payer: MEDICARE

## 2021-10-14 VITALS
WEIGHT: 171 LBS | SYSTOLIC BLOOD PRESSURE: 157 MMHG | BODY MASS INDEX: 25.33 KG/M2 | TEMPERATURE: 98 F | OXYGEN SATURATION: 100 % | DIASTOLIC BLOOD PRESSURE: 79 MMHG | HEART RATE: 75 BPM | RESPIRATION RATE: 16 BRPM | HEIGHT: 69 IN

## 2021-10-14 DIAGNOSIS — Z12.5 SPECIAL SCREENING FOR MALIGNANT NEOPLASM OF PROSTATE: ICD-10-CM

## 2021-10-14 DIAGNOSIS — R42 VERTIGO: ICD-10-CM

## 2021-10-14 DIAGNOSIS — Z13.31 SCREENING FOR DEPRESSION: ICD-10-CM

## 2021-10-14 DIAGNOSIS — Z00.00 MEDICARE ANNUAL WELLNESS VISIT, SUBSEQUENT: Primary | ICD-10-CM

## 2021-10-14 DIAGNOSIS — Z23 NEEDS FLU SHOT: ICD-10-CM

## 2021-10-14 DIAGNOSIS — Z13.6 SCREENING FOR ISCHEMIC HEART DISEASE: ICD-10-CM

## 2021-10-14 PROCEDURE — G0439 PPPS, SUBSEQ VISIT: HCPCS | Performed by: FAMILY MEDICINE

## 2021-10-14 PROCEDURE — G0008 ADMIN INFLUENZA VIRUS VAC: HCPCS | Performed by: FAMILY MEDICINE

## 2021-10-14 PROCEDURE — G8536 NO DOC ELDER MAL SCRN: HCPCS | Performed by: FAMILY MEDICINE

## 2021-10-14 PROCEDURE — G8432 DEP SCR NOT DOC, RNG: HCPCS | Performed by: FAMILY MEDICINE

## 2021-10-14 PROCEDURE — G8419 CALC BMI OUT NRM PARAM NOF/U: HCPCS | Performed by: FAMILY MEDICINE

## 2021-10-14 PROCEDURE — G8428 CUR MEDS NOT DOCUMENT: HCPCS | Performed by: FAMILY MEDICINE

## 2021-10-14 PROCEDURE — 1101F PT FALLS ASSESS-DOCD LE1/YR: CPT | Performed by: FAMILY MEDICINE

## 2021-10-14 PROCEDURE — 90694 VACC AIIV4 NO PRSRV 0.5ML IM: CPT | Performed by: FAMILY MEDICINE

## 2021-10-14 PROCEDURE — 3017F COLORECTAL CA SCREEN DOC REV: CPT | Performed by: FAMILY MEDICINE

## 2021-10-14 NOTE — PROGRESS NOTES
This is the Subsequent Medicare Annual Wellness Exam, performed 12 months or more after the Initial AWV or the last Subsequent AWV    I have reviewed the patient's medical history in detail and updated the computerized patient record. Assessment/Plan   Education and counseling provided:  Are appropriate based on today's review and evaluation    1. Medicare annual wellness visit, subsequent  2. Screening for depression  -     DEPRESSION SCREEN ANNUAL  3. Screening for ischemic heart disease  -     LIPID PANEL; Future  4. Special screening for malignant neoplasm of prostate  -     PSA SCREENING (SCREENING); Future       Depression Risk Factor Screening     3 most recent PHQ Screens 11/19/2020   Little interest or pleasure in doing things Not at all   Feeling down, depressed, irritable, or hopeless Not at all   Total Score PHQ 2 0       Alcohol Risk Screen    Do you average more than 1 drink per night or more than 7 drinks a week: No    In the past three months have you have had more than 4 drinks containing alcohol on one occasion: No        Functional Ability and Level of Safety    Hearing: Hearing is good. Activities of Daily Living: The home contains: no safety equipment. Patient does total self care      Ambulation: with no difficulty     Fall Risk:  Fall Risk Assessment, last 12 mths 11/19/2020   Able to walk? Yes   Fall in past 12 months?  No      Abuse Screen:  Patient is not abused       Cognitive Screening    Has your family/caregiver stated any concerns about your memory: no     Cognitive Screening: Normal - MMSE (Mini Mental Status Exam)    Health Maintenance Due     Health Maintenance Due   Topic Date Due    Shingrix Vaccine Age 49> (1 of 2) Never done    Pneumococcal 65+ years (2 of 2 - PPSV23) 03/01/2018    Flu Vaccine (1) 09/01/2021    Medicare Yearly Exam  10/09/2021       Patient Care Team   Patient Care Team:  Emilia Carmichael MD as PCP - General (Family Medicine)  Emilia Carmichael MD as PCP - [facility]    History     Patient Active Problem List   Diagnosis Code    Hyperlipidemia E78.5    ACP (advance care planning) Z71.89     No past medical history on file. Past Surgical History:   Procedure Laterality Date    HX ORTHOPAEDIC       Current Outpatient Medications   Medication Sig Dispense Refill    rosuvastatin (CRESTOR) 5 mg tablet Take 1 Tab by mouth nightly. 30 Tab 3    TRAVATAN Z 0.004 % ophthalmic solution INSTILL 1 DROP INTO BOTH EYES IN THE EVENING  6    fluticasone (FLONASE) 50 mcg/actuation nasal spray 2 Sprays by Both Nostrils route daily.  efinaconazole (JUBLIA) erlinda topical solution Apply  to affected area daily.  Cholecalciferol, Vitamin D3, (VITAMIN D3) 2,000 unit cap capsule Take  by mouth two (2) times a day.  omega-3 fatty acids-vitamin e (FISH OIL) 1,000 mg cap Take 1 Cap by mouth.  ascorbic acid, vitamin C, (VITAMIN C) 500 mg tablet Take 1,000 mg by mouth.  glucosamine-chondroitin (ELATION) 1,500-1,200 mg/30 mL liqd Take  by mouth. No Known Allergies    No family history on file. Social History     Tobacco Use    Smoking status: Current Some Day Smoker     Types: Cigars    Smokeless tobacco: Current User   Substance Use Topics    Alcohol use:  Yes     Alcohol/week: 2.0 standard drinks     Types: 2 Glasses of wine per week         Ann Apple MD

## 2021-10-14 NOTE — PATIENT INSTRUCTIONS
Medicare Wellness Visit, Male    The best way to live healthy is to have a lifestyle where you eat a well-balanced diet, exercise regularly, limit alcohol use, and quit all forms of tobacco/nicotine, if applicable. Regular preventive services are another way to keep healthy. Preventive services (vaccines, screening tests, monitoring & exams) can help personalize your care plan, which helps you manage your own care. Screening tests can find health problems at the earliest stages, when they are easiest to treat. [facility] follows the current, evidence-based guidelines published by the [facility] He[facility] ([facility]) when recommending preventive services for our patients. Because we follow these guidelines, sometimes recommendations change over time as research supports it. (For example, a prostate screening blood test is no longer routinely recommended for men with no symptoms). Of course, you and your doctor may decide to screen more often for some diseases, based on your risk and co-morbidities (chronic disease you are already diagnosed with). Preventive services for you include:  - Medicare offers their members a free annual wellness visit, which is time for you and your primary care provider to discuss and plan for your preventive service needs. Take advantage of this benefit every year!  -All adults over age 72 should receive the recommended pneumonia vaccines. Current USPSTF guidelines recommend a series of two vaccines for the best pneumonia protection.   -All adults should have a flu vaccine yearly and tetanus vaccine every 10 years.  -All adults age 48 and older should receive the shingles vaccines (series of two vaccines).        -All adults age 38-68 who are overweight should have a diabetes screening test once every three years.   -Other screening tests & preventive services for persons with diabetes include: an eye exam to screen for diabetic retinopathy, a kidney function test, a foot exam, and stricter control over your cholesterol.   -Cardiovascular screening for adults with routine risk involves an electrocardiogram (ECG) at intervals determined by the provider.   -Colorectal cancer screening should be done for adults age 54-65 with no increased risk factors for colorectal cancer. There are a number of acceptable methods of screening for this type of cancer. Each test has its own benefits and drawbacks. Discuss with your provider what is most appropriate for you during your annual wellness visit. The different tests include: colonoscopy (considered the best screening method), a fecal occult blood test, a fecal DNA test, and sigmoidoscopy.  -All adults born between [facility] should be screened once for Hepatitis C.  -An Abdominal Aortic Aneurysm (AAA) Screening is recommended for men age 73-68 who has ever smoked in their lifetime.      Here is a list of your current Health Maintenance items (your personalized list of preventive services) with a due date:  Health Maintenance Due   Topic Date Due    Shingles Vaccine (1 of 2) Never done    Pneumococcal Vaccine (2 of 2 - PPSV23) 03/01/2018    Yearly Flu Vaccine (1) 09/01/2021    Annual Well Visit  10/09/2021

## 2021-10-14 NOTE — PROGRESS NOTES
Chief Complaint   Patient presents with    Annual Wellness Visit     Patient is here for a medicare wellness visit      he is a 70y.o. year old male who presents for vertigo. Patient states that he went to the [place] ER on Saturday after waking up with the room spinning around him. Patient denies any head trauma but states he has had vertigo in the past.  They did not do any testing and gave him meclizine. Patient states that he has not taken this, his symptoms have gotten slightly better but he does have vertigo currently. Patient denies any weakness in his facial muscles or any gait abnormalities. Reviewed and agree with Nurse Note and duplicated in this note. Reviewed PmHx, RxHx, FmHx, SocHx, AllgHx and updated and dated in the chart. No family history on file. No past medical history on file. Social History     Socioeconomic History    Marital status:      Spouse name: Not on file    Number of children: Not on file    Years of education: Not on file    Highest education level: Not on file   Tobacco Use    Smoking status: Current Some Day Smoker     Types: Cigars    Smokeless tobacco: Current User   Substance and Sexual Activity    Alcohol use: Yes     Alcohol/week: 2.0 standard drinks     Types: 2 Glasses of wine per week    Drug use: No    Sexual activity: Yes     Social Determinants of Health     Financial Resource Strain:     Difficulty of Paying Living Expenses:    Food Insecurity:     Worried About Running Out of Food in the Last Year:     [address] in the Last Year:    Transportation Needs:     Lack of Transportation (Medical):      Lack of Transportation (Non-Medical):    Physical Activity:     Days of Exercise per Week:     Minutes of Exercise per Session:    Stress:     Feeling of Stress :    Social Connections:     Frequency of Communication with Friends and Family:     Frequency of Social Gatherings with Friends and Family:     Attends Jain Services:  Active Member of Clubs or Organizations:     Attends Club or Organization Meetings:     Marital Status:         Review of Systems - negative except as listed above      Objective:     Vitals:    10/14/21 1645   BP: (!) 157/79   Pulse: 75   Resp: 16   Temp: 98 °F (36.7 °C)   SpO2: 100%   Weight: 171 lb (77.6 kg)   Height: 5' 9\" (1.753 m)       Physical Examination: General appearance - alert, well appearing, and in no distress  Eyes - pupils equal and reactive, extraocular eye movements intact  Ears - bilateral TM's and external ear canals normal  Nose - normal and patent, no erythema, discharge or polyps  Mouth - mucous membranes moist, pharynx normal without lesions  Chest - clear to auscultation, no wheezes, rales or rhonchi, symmetric air entry  Heart - normal rate, regular rhythm, normal S1, S2, no murmurs, rubs, clicks or gallops  Abdomen - soft, nontender, nondistended, no masses or organomegaly  Musculoskeletal - no joint tenderness, deformity or swelling  Extremities - peripheral pulses normal, no pedal edema, no clubbing or cyanosis  Skin - normal coloration and turgor, no rashes, no suspicious skin lesions noted       Assessment/ Plan:   Diagnoses and all orders for this visit:    1. Medicare annual wellness visit, subsequent    2. Screening for depression  -     DEPRESSION SCREEN ANNUAL    3. Screening for ischemic heart disease  -     LIPID PANEL; Future    4. Special screening for malignant neoplasm of prostate  -     PSA SCREENING (SCREENING); Future    5. Vertigo  -     REFERRAL TO PHYSICAL THERAPY  -     REFERRAL TO NEUROLOGY    Patient will take meclizine as needed  Follow-up and Dispositions    · Return in about 1 year (around 10/14/2022), or if symptoms worsen or fail to improve. Labs to be drawn: CBC, CMP, Lipid            I have discussed the diagnosis with the patient and the intended plan as seen in the above orders.   The patient has received an after-visit summary and questions were answered concerning future plans. Medication Side Effects and Warnings were discussed with patient,  Patient Labs were reviewed and or requested, and  Patient Past Records were reviewed and or requested  yes         Pt agrees to call or return to clinic and/or go to closest ER with any worsening of symptoms. This may include, but not limited to increased fever (>100.4) with NSAIDS or Tylenol, increased edema, confusion, rash, worsening of presenting symptoms. Please note that this dictation was completed with [employer], the computer voice recognition software. Quite often unanticipated grammatical, syntax, homophones, and other interpretive errors are inadvertently transcribed by the computer software. Please disregard these errors. Please excuse any errors that have escaped final proofreading. Thank you.

## 2021-10-15 LAB
CHOLEST SERPL-MCNC: 302 MG/DL
HDLC SERPL-MCNC: 76 MG/DL
HDLC SERPL: 4 {RATIO} (ref 0–5)
LDLC SERPL CALC-MCNC: 191.6 MG/DL (ref 0–100)
PSA SERPL-MCNC: 1.3 NG/ML (ref 0.01–4)
TRIGL SERPL-MCNC: 172 MG/DL (ref ?–150)
VLDLC SERPL CALC-MCNC: 34.4 MG/DL

## 2021-10-15 RX ORDER — ROSUVASTATIN CALCIUM 20 MG/1
20 TABLET, COATED ORAL
Qty: 90 TABLET | Refills: 1 | Status: SHIPPED | OUTPATIENT
Start: 2021-10-15

## 2021-10-15 NOTE — PROGRESS NOTES
Your cholesterol is very high, I will increase your Crestor.   Please take as directed and let us repeat in 3 months

## 2021-10-27 ENCOUNTER — HOSPITAL ENCOUNTER (OUTPATIENT)
Dept: PHYSICAL THERAPY | Age: 71
Discharge: HOME | End: 2021-10-27
Payer: MEDICARE

## 2021-10-27 DIAGNOSIS — R42 VERTIGO: ICD-10-CM

## 2021-10-27 PROCEDURE — 97161 PT EVAL LOW COMPLEX 20 MIN: CPT | Performed by: PHYSICAL THERAPIST

## 2021-10-27 NOTE — PROGRESS NOTES
Physical Therapy at [facility],   a part of 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 Assessment/ lazo information: 70 y.o. 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 Evaluation Complexity History MEDIUM  Complexity : 1-2 comorbidities / personal factors will impact the outcome/ POC ; Examination HIGH Complexity : 4+ Standardized tests and measures addressing body structure, function, activity limitation and / or participation in recreation  ;Presentation LOW Complexity : Stable, uncomplicated  ;   Overall Complexity Rating: LOW     Problem List: impaired gait/ balance, decrease ADL/ functional abilitiies and decrease activity tolerance   Treatment Plan may include any combination of the following: Therapeutic exercise, Therapeutic activities, Neuromuscular re-education, Physical agent/modality, Gait/balance training, Manual therapy, Patient education and Self Care training  Patient / Family readiness to learn indicated by: asking questions  Persons(s) to be included in education: patient (P)  Barriers to Learning/Limitations: None  Patient Goal (s): Pre-Hospital Care Report (PCR) reduce dizziness/vertigo  Patient Self Reported Health Status: good  Rehabilitation Potential: good    Short Term Goals: To be accomplished in 4-6 treatments:  1)  Independent with HEP  2) Perform Gaze Stabilization at 1 Hz in sitting for >/= 30 seconds before onset of symptoms  3) Perform Saccades >/= 30 seconds before onset of symptoms  4) Perform VOR Cancellation in sitting for >/=30 seconds before onset of symptoms  5) Balance on firm surface with eyes closed >/=  45 seconds before onset of symptoms    Long Term Goals: To be accomplished in 8-20  treatments:  1) Perform Gaze Stabilization x1 while walking for >/= 60 seconds without symptoms  2) Perform Gaze Stabilization x2 while walking for >/= 60 seconds without symptoms  3) Perform Saccades >/= 60 seconds without symptoms  4) Perform VOR Cancellation while walking without reproduction of symptoms  5) Balance on firm surface with eyes closed >/= 60 seconds without symptoms   6) Balance on soft surface with eyes closed >/= 60 seconds without symptoms  7) Tolerate visual conflict while walking without symptoms    Frequency / Duration: Patient to be seen 2 times per week for 8-20 treatments. Patient/ Caregiver education and instruction: activity modification and exercises    [x]  Plan of care has been reviewed with PTA      Certification Period: 10/27/2021 - 1/26/2022  Asaf Sargent PT, DPT 10/27/2021     ________________________________________________________________________    I certify that the above Therapy Services are being furnished while the patient is under my care. I agree with the treatment plan and certify that this therapy is necessary.     [de-identified] Signature:____________________  Date:____________Time: _________         Jesus Conde MD

## 2021-10-27 NOTE — PROGRESS NOTES
PT INITIAL EVALUATION NOTE - [facility] 2-15    Patient Name: Mart Savage.  Date:10/27/2021  : 1950  [x]  Patient  Verified  Payor: [employer] / Plan: [place] MED[place]ARE PART A & B / Product Type: Medicare /    In time: 3551V  Out time: 1200p  Total Treatment Time (min): 50  Total Timed Codes (min): --  1:1 Treatment Time ( only): --   Visit #: 1     Treatment Area: Vertigo [R42]    SUBJECTIVE  Pain Level (0-10 scale): 0/10   Dizziness: 4/10  Any medication changes, allergies to medications, adverse drug reactions, diagnosis change, or new procedure performed?: [] No    [x] Yes (see summary sheet for update)  Subjective:    Has had episodes of vertigo and dizziness > 20 yrs ago. 2017 Vertigo intensity increased. Approx 3 weeks ago he rolled to his left side he felt intense vertigo that woke him out of his sleep lasting approx 15-20 seconds. The rest of the day he was off balance and dizzy and did not feel safe to drive. He went to the [place] 2 days later for check up and was prescribed meclizine. He does still notice pressure inside his ears and dizziness/off sensation. If he looks up into extension or flexion and leaning forward or lie flat he will experience the vertigo. He has been sleeping with a wedge pillow or in his reclining chair. He has not received any treatment in the past, has just avoided the movements and wait out the symptoms to pass. Lives by himself. He has made adjustments avoiding situations that would increase his fall risk.     OBJECTIVE/EXAMINATION    Observations:  Posture: upright sitting and standing  Gait: ambulate with deviation from linear path when turning head but no loss of balance noted  Functional Mobility: able to perform sit-stand transfer  Palpation: NT  Cervical AROM:  Flexion 35 Dizziness  Extension 45 dizziness  Side Bend R 25 dizziness L 25  Rotation R 65 L 65 (increased tinnitus)  Strength:  UE: Grossly 5/5  LE: Grossly 5/5    VOMS   Headache Dizziness Nausea Fogginess Observations:   Baseline 0 2 0 0    Horizontal Smooth Pursuits  (10 reps) 0 0 0 0 Mild nystagmus   Vertical Smooth Pursuits  (10 reps) 0 3 0 0 Mild nystagmus   Horizontal saccades  (10 reps) 0 3 0 0 increase at rep 9   Vertical Saccades  (10 reps) 0 0 0 0 normal   Convergence  (3 reps, cm) 0 0 0 0 Blurred vision at 20 cm, diplopia at 4 cm   VOR x 1 (H)  (180 bpm) 0 4 0 0 Dizziness increase approx 10 seconds   VOR x 1 (V)  (180 bpm) 0 4 0 0 Dizziness increase approx 8 seconds   VMS   (50 bpm) 0 5 0 0 Good movement pattern     Vertebral Artery Test (Modified): negative  BPPV:  Monica Hallpike: Right posterior Canal Cupulothiasis  Roll Test: negative  Deep Head Hang: negative     R Epley: Yes    Functional Tests:    Mod CTSIB:          Non-compliant surface      EO 30 seconds     EC 30 seconds         Compliant Surface     EO 30 seconds     EC 8 seconds          Other Objective/Functional Measures: --           Pain Level (0-10 scale) post treatment: Dizziness: 4/10    ASSESSMENT/Changes in Function:     [x]  See Plan of Care      Anthony Dejesus, PT, DPT 10/27/2021

## 2021-11-01 ENCOUNTER — HOSPITAL ENCOUNTER (OUTPATIENT)
Dept: PHYSICAL THERAPY | Age: 71
Discharge: HOME | End: 2021-11-01
Payer: MEDICARE

## 2021-11-01 PROCEDURE — 97112 NEUROMUSCULAR REEDUCATION: CPT | Performed by: PHYSICAL THERAPIST

## 2021-11-01 PROCEDURE — 97140 MANUAL THERAPY 1/> REGIONS: CPT | Performed by: PHYSICAL THERAPIST

## 2021-11-01 NOTE — PROGRESS NOTES
PT DAILY TREATMENT NOTE - [facility] 2-15    Patient Name: Dmitri Wakefield.  Date:2021  : 1950  [x]  Patient  Verified  Payor: [place] MEDICARE / Plan: [place] MEDICARE PART A & B / Product Type: Medicare /    In time: 1205p  Out time:1250p  Total Treatment Time (min): 45  Total Timed Codes (min): 45  1:1 Treatment Time ( only): 39   Visit #:  2    Treatment Area: Vertigo [R42]    SUBJECTIVE  Pain Level (0-10 scale):  0/10    Dizziness: 1-2/10  Any medication changes, allergies to medications, adverse drug reactions, diagnosis change, or new procedure performed?: [x] No    [] Yes (see summary sheet for update)  Subjective functional status/changes:   [] No changes reported  Pt reports that as long as he keeps his head pretty still with movements then he does not feel much dizziness. OBJECTIVE         30 min Neuromuscular Re-education:  [x]  See flow sheet :   Rationale: increase ROM, increase strength, improve coordination, improve balance and increase proprioception  to improve the patients ability to resume normal vestibular performance      15 min Manual Therapy: Right Eply Maneuver and instruction for home treatment    Rationale: correct positional vertigo to improve the patients ability to resume normal movement            With   [] TE   [] TA   [] Neuro   [] SC   [x] other: Patient Education: [x] Review HEP    [] Progressed/Changed HEP based on:   [x] positioning   [x] body mechanics   [] transfers   [] heat/ice application    [] other:      Other Objective/Functional Measures: --     Pain Level (0-10 scale) post treatment:  0/10    Dizziness: 1-2/10    ASSESSMENT/Changes in Function:   No vertigo or nystagmus noted with Eply position 1 however reproduced in position 2. Demonstrated loss of balance to the right with compliant surface eyes closed narrow base of support initally then began to show improvement within treatment session.   Patient will continue to benefit from skilled PT services to modify and progress therapeutic interventions, address functional mobility deficits, address ROM deficits, address strength deficits, analyze and address soft tissue restrictions, analyze and cue movement patterns, analyze and modify body mechanics/ergonomics, assess and modify postural abnormalities and address imbalance/dizziness to attain remaining goals. []  See Plan of Care  []  See progress note/recertification  []  See Discharge Summary         Progress towards goals / Updated goals:  Short Term Goals: To be accomplished in 4-6 treatments:  1)  Independent with HEP Progressing  2) Perform Gaze Stabilization at 1 Hz in sitting for >/= 30 seconds before onset of symptoms  3) Perform Saccades >/= 30 seconds before onset of symptoms  4) Perform VOR Cancellation in sitting for >/=30 seconds before onset of symptoms  5) Balance on firm surface with eyes closed >/=  45 seconds before onset of symptoms     Long Term Goals:  To be accomplished in 8-20  treatments:  1) Perform Gaze Stabilization x1 while walking for >/= 60 seconds without symptoms  2) Perform Gaze Stabilization x2 while walking for >/= 60 seconds without symptoms  3) Perform Saccades >/= 60 seconds without symptoms  4) Perform VOR Cancellation while walking without reproduction of symptoms  5) Balance on firm surface with eyes closed >/= 60 seconds without symptoms       6) Balance on soft surface with eyes closed >/= 60 seconds without symptoms  7) Tolerate visual conflict while walking without symptoms      PLAN  [x]  Upgrade activities as tolerated     [x]  Continue plan of care  []  Update interventions per flow sheet       []  Discharge due to:_  []  Other:_      Mihai Vásquez, PT, DPT 11/1/2021

## 2021-11-05 ENCOUNTER — HOSPITAL ENCOUNTER (OUTPATIENT)
Dept: PHYSICAL THERAPY | Age: 71
Discharge: HOME | End: 2021-11-05
Payer: MEDICARE

## 2021-11-05 PROCEDURE — 97112 NEUROMUSCULAR REEDUCATION: CPT | Performed by: PHYSICAL THERAPIST

## 2021-11-05 PROCEDURE — 97140 MANUAL THERAPY 1/> REGIONS: CPT | Performed by: PHYSICAL THERAPIST

## 2021-11-05 NOTE — PROGRESS NOTES
PT DAILY TREATMENT NOTE - [facility] 2-15    Patient Name: Jessica Mcconnell.  Date:2021  : 1950  [x]  Patient  Verified  Payor: [place] MEDICARE / Plan: [place] MEDICARE PART A & B / Product Type: Medicare /    In time: 1100a Out time:1145a  Total Treatment Time (min): 45  Total Timed Codes (min): 45  1:1 Treatment Time ( only): 39   Visit #:  3    Treatment Area: Vertigo [R42]    SUBJECTIVE  Pain Level (0-10 scale):  0/10    Dizziness: 1-2/10  Any medication changes, allergies to medications, adverse drug reactions, diagnosis change, or new procedure performed?: [x] No    [] Yes (see summary sheet for update)  Subjective functional status/changes:   [] No changes reported  Pt reports that as long as he keeps his head pretty still with movements then he does not feel much dizziness. OBJECTIVE       30 min Neuromuscular Re-education:  [x]  See flow sheet :   Rationale: increase ROM, increase strength, improve coordination, improve balance and increase proprioception  to improve the patients ability to resume normal vestibular performance      15 min Manual Therapy: Right Eply Maneuver and instruction for home treatment    Rationale: correct positional vertigo to improve the patients ability to resume normal movement            With   [] TE   [] TA   [] Neuro   [] SC   [x] other: Patient Education: [x] Review HEP    [] Progressed/Changed HEP based on:   [x] positioning   [x] body mechanics   [] transfers   [] heat/ice application    [] other:      Other Objective/Functional Measures: --     Pain Level (0-10 scale) post treatment:  0/10    Dizziness: 1-2/10    ASSESSMENT/Changes in Function:   Vertigo and nystagmus noted with Eply position 1 and not reproduced in position 2. Balance is improving with reduction in loss of balance with eyes closed standing narrow base of support on compliant surface with head rotation.   No loss of balance with VOR Cancellation walking but did notice increased balance sway with turning around to change directions. Patient will continue to benefit from skilled PT services to modify and progress therapeutic interventions, address functional mobility deficits, address ROM deficits, address strength deficits, analyze and address soft tissue restrictions, analyze and cue movement patterns, analyze and modify body mechanics/ergonomics, assess and modify postural abnormalities and address imbalance/dizziness to attain remaining goals. []  See Plan of Care  []  See progress note/recertification  []  See Discharge Summary         Progress towards goals / Updated goals:  Short Term Goals: To be accomplished in 4-6 treatments:  1)  Independent with HEP Progressing  2) Perform Gaze Stabilization at 1 Hz in sitting for >/= 30 seconds before onset of symptoms  3) Perform Saccades >/= 30 seconds before onset of symptoms  4) Perform VOR Cancellation in sitting for >/=30 seconds before onset of symptoms  5) Balance on firm surface with eyes closed >/=  45 seconds before onset of symptoms     Long Term Goals:  To be accomplished in 8-20  treatments:  1) Perform Gaze Stabilization x1 while walking for >/= 60 seconds without symptoms  2) Perform Gaze Stabilization x2 while walking for >/= 60 seconds without symptoms  3) Perform Saccades >/= 60 seconds without symptoms  4) Perform VOR Cancellation while walking without reproduction of symptoms  5) Balance on firm surface with eyes closed >/= 60 seconds without symptoms       6) Balance on soft surface with eyes closed >/= 60 seconds without symptoms  7) Tolerate visual conflict while walking without symptoms      PLAN  [x]  Upgrade activities as tolerated     [x]  Continue plan of care  []  Update interventions per flow sheet       []  Discharge due to:_  []  Other:_      Anna Smoker, PT, DPT 11/5/2021

## 2021-11-09 ENCOUNTER — HOSPITAL ENCOUNTER (OUTPATIENT)
Dept: PHYSICAL THERAPY | Age: 71
Discharge: HOME | End: 2021-11-09
Payer: MEDICARE

## 2021-11-09 PROCEDURE — 95992 CANALITH REPOSITIONING PROC: CPT

## 2021-11-09 PROCEDURE — 97112 NEUROMUSCULAR REEDUCATION: CPT

## 2021-11-09 NOTE — PROGRESS NOTES
PT DAILY TREATMENT NOTE - [facility] 2-15    Patient Name: Deepak Grace.  Date:2021  : 1950  [x]  Patient  Verified  Payor: VA MEDICARE / Plan: VA MEDICARE PART A & B / Product Type: Medicare /    In time:1245  Out time:1330  Total Treatment Time (min): 45  Total Timed Codes (min): 25  1:1 Treatment Time ( only): 25   Visit #:  4    Treatment Area: Vertigo [R42]    SUBJECTIVE  Pain Level (0-10 scale): 1/10 equilibrium  Any medication changes, allergies to medications, adverse drug reactions, diagnosis change, or new procedure performed?: [x] No    [] Yes (see summary sheet for update)  Subjective functional status/changes:   [] No changes reported  I feel better     OBJECTIVE    25 min Neuromuscular Re-education:  [x]  See flow sheet :   Rationale: improve coordination, improve balance and increase proprioception  to improve the patients ability to negotiate his home safely and without symptoms    20 min Canalith Repositioning:  [x]  See flow sheet : R liberatory   Rationale: treat BPPV  to improve the patients ability to fully participate in day to day activities          With   [] TE   [] TA   [] Neuro   [] SC   [] other: Patient Education: [x] Review HEP    [] Progressed/Changed HEP based on:   [] positioning   [] body mechanics   [] transfers   [] heat/ice application    [] other:      Other Objective/Functional Measures:      Pain Level (0-10 scale) post treatment: 0-1/10 equilibrium    ASSESSMENT/Changes in Function:   Performed Liberatory maneuver with patient reporting increased symptom provocation but full resolution at completion. Progressed dynamic visual challenges and anticipate incorporating 90 or 180 deg turns for adaptation training at next treatment session.   Patient will continue to benefit from skilled PT services to modify and progress therapeutic interventions, address functional mobility deficits, address ROM deficits, address strength deficits, analyze and address soft tissue restrictions, analyze and cue movement patterns, analyze and modify body mechanics/ergonomics, assess and modify postural abnormalities and address imbalance/dizziness to attain remaining goals. [x]  See Plan of Care  []  See progress note/recertification  []  See Discharge Summary         Progress towards goals / Updated goals:  Patient is progressing well towards his goals.      PLAN  [x]  Upgrade activities as tolerated     [x]  Continue plan of care  []  Update interventions per flow sheet       []  Discharge due to:_  []  Other:_      Yanick Andres, PT 11/9/2021

## 2021-11-12 ENCOUNTER — HOSPITAL ENCOUNTER (OUTPATIENT)
Dept: PHYSICAL THERAPY | Age: 71
Discharge: HOME | End: 2021-11-12
Payer: MEDICARE

## 2021-11-12 PROCEDURE — 97112 NEUROMUSCULAR REEDUCATION: CPT | Performed by: PHYSICAL THERAPIST

## 2021-11-12 PROCEDURE — 97140 MANUAL THERAPY 1/> REGIONS: CPT | Performed by: PHYSICAL THERAPIST

## 2021-11-12 NOTE — PROGRESS NOTES
PT DAILY TREATMENT NOTE - [facility] 2-15    Patient Name: Julio Schroeder.  Date:2021  : 1950  [x]  Patient  Verified  Payor: Megha Craft / Plan: [place] / Product Type: Medicare /    In time:1100a Out time: 1145a  Total Treatment Time (min): 45  Total Timed Codes (min): 45  1:1 Treatment Time ( [address] only): 39  Visit #:  5    Treatment Area: Vertigo [R42]    SUBJECTIVE  Pain Level (0-10 scale): 1/10 equilibrium  Any medication changes, allergies to medications, adverse drug reactions, diagnosis change, or new procedure performed?: [x] No    [] Yes (see summary sheet for update)  Subjective functional status/changes:   [] No changes reported  Pt states that he has not felt much vertigo at all since his last session. OBJECTIVE    30 min Neuromuscular Re-education:  [x]  See flow sheet :   Rationale: improve coordination, improve balance and increase proprioception  to improve the patients ability to negotiate his home safely and without symptoms    15 min Canalith Repositioning:  [x]  See flow sheet : R liberatory   Rationale: treat BPPV  to improve the patients ability to fully participate in day to day activities          With   [] TE   [] TA   [] Neuro   [] SC   [] other: Patient Education: [x] Review HEP    [] Progressed/Changed HEP based on:   [] positioning   [] body mechanics   [] transfers   [] heat/ice application    [] other:      Other Objective/Functional Measures:      Pain Level (0-10 scale) post treatment: 0-1/10 equilibrium    ASSESSMENT/Changes in Function:   No vertigo symptoms reported nor nystagmus noted during or after Liberatory maneuver today. Advanced with vestibular rehab therapy today with whole body and head movements up/down and horizontal during ball toss on compliant and non-compliant surfaces.   Ball circles did create some dizziness but no vertigo and resolved with rest.  Patient will continue to benefit from skilled PT services to modify and progress therapeutic interventions, address functional mobility deficits, address ROM deficits, address strength deficits, analyze and address soft tissue restrictions, analyze and cue movement patterns, analyze and modify body mechanics/ergonomics, assess and modify postural abnormalities and address imbalance/dizziness to attain remaining goals. [x]  See Plan of Care  []  See progress note/recertification  []  See Discharge Summary         Progress towards goals / Updated goals:  Short Term Goals: To be accomplished in 4-6 treatments:  1)  Independent with HEP MET  2) Perform Gaze Stabilization at 1 Hz in sitting for >/= 30 seconds before onset of symptoms MET  3) Perform Saccades >/= 30 seconds before onset of symptoms MET  4) Perform VOR Cancellation in sitting for >/=30 seconds before onset of symptoms MET  5) Balance on firm surface with eyes closed >/=  45 seconds before onset of symptoms MET     Long Term Goals:  To be accomplished in 8-20  treatments:  1) Perform Gaze Stabilization x1 while walking for >/= 60 seconds without symptoms MET  2) Perform Gaze Stabilization x2 while walking for >/= 60 seconds without symptoms  3) Perform Saccades >/= 60 seconds without symptoms MET  4) Perform VOR Cancellation while walking without reproduction of symptoms Progressing  5) Balance on firm surface with eyes closed >/= 60 seconds without symptoms       6) Balance on soft surface with eyes closed >/= 60 seconds without symptoms  7) Tolerate visual conflict while walking without symptoms       PLAN  [x]  Upgrade activities as tolerated     [x]  Continue plan of care  []  Update interventions per flow sheet       []  Discharge due to:_  []  Other:_      Asaf Sargent PT, ROLF 11/12/2021

## 2021-11-16 ENCOUNTER — HOSPITAL ENCOUNTER (OUTPATIENT)
Dept: PHYSICAL THERAPY | Age: 71
Discharge: HOME | End: 2021-11-16
Payer: MEDICARE

## 2021-11-16 PROCEDURE — 95992 CANALITH REPOSITIONING PROC: CPT

## 2021-11-16 PROCEDURE — 97112 NEUROMUSCULAR REEDUCATION: CPT

## 2021-11-16 NOTE — PROGRESS NOTES
PT DAILY TREATMENT NOTE - [facility] 2-15    Patient Name: Amanda Bartlett.  Date:2021  : 1950  [x]  Patient  Verified  Payor: [place] MEDICARE / Plan: [place] MEDICARE PART A & B / Product Type: Medicare /    In time:1100  Out time:1153  Total Treatment Time (min): 53  Total Timed Codes (min): 43  1:1 Treatment Time ( only): 37   Visit #:  6    Treatment Area: Vertigo [R42]    SUBJECTIVE  Pain Level (0-10 scale): 2-3/10 dizziness  Any medication changes, allergies to medications, adverse drug reactions, diagnosis change, or new procedure performed?: [x] No    [] Yes (see summary sheet for update)  Subjective functional status/changes:   [] No changes reported  I started to feel it a little more on . I've had a very busy few days-I had to get up very early this morning, had a tooth pulled yesterday, and feel some sinus pressure. OBJECTIVE    10 min Canalith Repositioning:  [x]  See flow sheet : R liberatory    Rationale: improve vestibular performance  to improve the patients ability to return to all previous      43 min Neuromuscular Re-education:  [x]  See flow sheet :   Rationale: increase strength, improve coordination, improve balance and increase proprioception  to improve the patients ability to safely balance          With   [] TE   [] TA   [] Neuro   [] SC   [] other: Patient Education: [x] Review HEP    [] Progressed/Changed HEP based on:   [] positioning   [] body mechanics   [] transfers   [] heat/ice application    [] other:      Other Objective/Functional Measures:      Pain Level (0-10 scale) post treatment: 0-1/10 equilibrium symptoms, no dizziness     ASSESSMENT/Changes in Function:   Patient reported no symptom provocation with R Liberatory performed but did endorse decreased head symptoms afterwards. His symptoms were provoked with R head tilt during figure 8's and with higher level vestibular training. Overall, he reported feeling better at completion of session.    Patient will continue to benefit from skilled PT services to modify and progress therapeutic interventions, address functional mobility deficits, address ROM deficits, address strength deficits, analyze and address soft tissue restrictions, analyze and cue movement patterns, analyze and modify body mechanics/ergonomics, assess and modify postural abnormalities and address imbalance/dizziness to attain remaining goals. [x]  See Plan of Care  []  See progress note/recertification  []  See Discharge Summary         Progress towards goals / Updated goals:  Short Term Goals: To be accomplished in 4-6 treatments:  1)  Independent with HEP MET  2) Perform Gaze Stabilization at 1 Hz in sitting for >/= 30 seconds before onset of symptoms MET   3) Perform Saccades >/= 30 seconds before onset of symptoms  4) Perform VOR Cancellation in sitting for >/=30 seconds before onset of symptoms  5) Balance on firm surface with eyes closed >/=  45 seconds before onset of symptoms MET     Long Term Goals:  To be accomplished in 8-20  treatments:  1) Perform Gaze Stabilization x1 while walking for >/= 60 seconds without symptoms  2) Perform Gaze Stabilization x2 while walking for >/= 60 seconds without symptoms  3) Perform Saccades >/= 60 seconds without symptoms  4) Perform VOR Cancellation while walking without reproduction of symptoms  5) Balance on firm surface with eyes closed >/= 60 seconds without symptoms       6) Balance on soft surface with eyes closed >/= 60 seconds without symptoms  7) Tolerate visual conflict while walking without symptoms       PLAN  [x]  Upgrade activities as tolerated     [x]  Continue plan of care  [x]  Update interventions per flow sheet       []  Discharge due to:_  []  Other:_      Marquez Padilla, PT 11/16/2021

## 2021-11-18 ENCOUNTER — HOSPITAL ENCOUNTER (OUTPATIENT)
Dept: PHYSICAL THERAPY | Age: 71
Discharge: HOME | End: 2021-11-18
Payer: MEDICARE

## 2021-11-18 PROCEDURE — 97112 NEUROMUSCULAR REEDUCATION: CPT

## 2021-11-18 PROCEDURE — 97110 THERAPEUTIC EXERCISES: CPT

## 2021-11-18 NOTE — PROGRESS NOTES
PT DAILY TREATMENT NOTE - [facility] 2-15    Patient Name: Alex York.  Date:2021  : 1950  [x]  Patient  Verified  Payor: [place] MEDICARE / Plan: [place] MEDICARE PART A & B / Product Type: Medicare /    In time:1245  Out time:1332  Total Treatment Time (min): 47  Total Timed Codes (min): 47  1:1 Treatment Time ( only): 42   Visit #:  7    Treatment Area: Vertigo [R42]    SUBJECTIVE  Pain Level (0-10 scale): 0/10, no vertigo  Any medication changes, allergies to medications, adverse drug reactions, diagnosis change, or new procedure performed?: [x] No    [] Yes (see summary sheet for update)  Subjective functional status/changes:   [] No changes reported  I am still having some sinus symptoms but nothing with my inner ear or vertigo. OBJECTIVE    9 min Therapeutic Exercise:  [x] See flow sheet :   Rationale: increase ROM and increase strength to improve the patients ability to return to yardwork with good balance    38 min Neuromuscular Re-education:  [x]  See flow sheet : cardboard, foam balance, VOR x 1, amb with ball toss, hurdles with/without head turns,    Rationale: increase strength, improve coordination, improve balance, increase proprioception and improve vestibular performance  to improve the patients ability to balance safely and fully participate in all daily activities         With   [] TE   [] TA   [] Neuro   [] SC   [] other: Patient Education: [x] Review HEP    [] Progressed/Changed HEP based on:   [] positioning   [] body mechanics   [] transfers   [] heat/ice application    [] other:      Other Objective/Functional Measures:      Pain Level (0-10 scale) post treatment: 0/10    ASSESSMENT/Changes in Function:   He reported minimal symptoms with dynamic balance challenges such as ojdi negotiation with head turns and amb and amb with self tennis ball toss. Progressed SL balance and strengthening exercises.  Patient asked about returning to walking in the pool at the gym and therapist was in agreement with this plan. Recommended he monitor his response to the visual affect of the water and he verbalized his understanding. Patient will continue to benefit from skilled PT services to modify and progress therapeutic interventions, address functional mobility deficits, address ROM deficits, address strength deficits, analyze and address soft tissue restrictions, analyze and cue movement patterns, analyze and modify body mechanics/ergonomics and assess and modify postural abnormalities to attain remaining goals.      [x]  See Plan of Care  []  See progress note/recertification  []  See Discharge Summary         Progress towards goals / Updated goals:  Short Term Goals: To be accomplished in 4-6 treatments:  1)  Independent with HEP MET  2) Perform Gaze Stabilization at 1 Hz in sitting for >/= 30 seconds before onset of symptoms MET   3) Perform Saccades >/= 30 seconds before onset of symptoms MET (with cardboard)  4) Perform VOR Cancellation in sitting for >/=30 seconds before onset of symptoms  5) Balance on firm surface with eyes closed >/=  45 seconds before onset of symptoms MET     Long Term Goals: To be accomplished in 8-20  treatments:  1) Perform Gaze Stabilization x1 while walking for >/= 60 seconds without symptoms  2) Perform Gaze Stabilization x2 while walking for >/= 60 seconds without symptoms  3) Perform Saccades >/= 60 seconds without symptoms MET  4) Perform VOR Cancellation while walking without reproduction of symptoms  5) Balance on firm surface with eyes closed >/= 60 seconds without symptoms       6) Balance on soft surface with eyes closed >/= 60 seconds without symptoms  7) Tolerate visual conflict while walking without symptoms MET       PLAN  [x]  Upgrade activities as tolerated     [x]  Continue plan of care  []  Update interventions per flow sheet       []  Discharge due to:_  []  Other:_      Renu Davila, PT 11/18/2021        inidcated

## 2021-11-23 ENCOUNTER — APPOINTMENT (OUTPATIENT)
Dept: PHYSICAL THERAPY | Age: 71
End: 2021-11-23
Payer: MEDICARE

## 2021-11-29 ENCOUNTER — HOSPITAL ENCOUNTER (OUTPATIENT)
Dept: PHYSICAL THERAPY | Age: 71
Discharge: HOME | End: 2021-11-29
Payer: MEDICARE

## 2021-11-29 PROCEDURE — 97140 MANUAL THERAPY 1/> REGIONS: CPT | Performed by: PHYSICAL THERAPIST

## 2021-11-29 PROCEDURE — 97112 NEUROMUSCULAR REEDUCATION: CPT | Performed by: PHYSICAL THERAPIST

## 2021-11-29 NOTE — PROGRESS NOTES
PT DAILY TREATMENT NOTE - [facility] 2-15    Patient Name: Alex York.  Date:2021  : 1950  [x]  Patient  Verified  Payor: Sangeeta Lee / Plan: [place] MEDICARE PART A & B / Product Type: Medicare /    In time:1215p Out time:100p  Total Treatment Time (min): 45  Total Timed Codes (min): 45  1:1 Treatment Time ( [address] only): 45   Visit #:  8    Treatment Area: Vertigo [R42]    SUBJECTIVE  Pain Level (0-10 scale): 2-3/10 vertigo  Any medication changes, allergies to medications, adverse drug reactions, diagnosis change, or new procedure performed?: [x] No    [] Yes (see summary sheet for update)  Subjective functional status/changes:   [] No changes reported  Pt states that he has been having more vertigo over the past 2-3 days since he was blowing his leaves and a rehana of wind blew a cloud of leave dust into his face. He had vertigo with rolling L>R this morning. OBJECTIVE    10 min Manual Therapy: Canalith Repositioning:  [x]? See flow sheet : R liberatory   Rationale: treat BPPV  to improve the patients ability to fully participate in day to day activities                                                    35 min Neuromuscular Re-education:  [x]  See flow sheet : cardboard, foam balance, VOR x 1, habituation training with forward bending and visual targeting   Rationale: increase strength, improve coordination, improve balance, increase proprioception and improve vestibular performance  to improve the patients ability to balance safely and fully participate in all daily activities         With   [] TE   [] TA   [] Neuro   [] SC   [] other: Patient Education: [x] Review HEP    [] Progressed/Changed HEP based on:   [] positioning   [] body mechanics   [] transfers   [] heat/ice application    [] other:      Other Objective/Functional Measures:      Pain Level (0-10 scale) post treatment: 0/10    ASSESSMENT/Changes in Function:   Monica-Hallpike test positive for R PC.  Right Liberatory maneuver was effective at reducing vertigo. Instructed to resume CRT at home for the next few days. Advanced with VOR and habituation training today with only mild increase of dizziness during. .  Patient will continue to benefit from skilled PT services to modify and progress therapeutic interventions, address functional mobility deficits, address ROM deficits, address strength deficits, analyze and address soft tissue restrictions, analyze and cue movement patterns, analyze and modify body mechanics/ergonomics and assess and modify postural abnormalities to attain remaining goals.      []  See Plan of Care  [x]  See progress note/recertification  []  See Discharge Summary         Progress towards goals / Updated goals:  Short Term Goals: To be accomplished in 4-6 treatments:  1)  Independent with HEP MET  2) Perform Gaze Stabilization at 1 Hz in sitting for >/= 30 seconds before onset of symptoms MET   3) Perform Saccades >/= 30 seconds before onset of symptoms MET (with cardboard)  4) Perform VOR Cancellation in sitting for >/=30 seconds before onset of symptoms  5) Balance on firm surface with eyes closed >/=  45 seconds before onset of symptoms MET     Long Term Goals: To be accomplished in 8-20  treatments:  1) Perform Gaze Stabilization x1 while walking for >/= 60 seconds without symptoms  2) Perform Gaze Stabilization x2 while walking for >/= 60 seconds without symptoms  3) Perform Saccades >/= 60 seconds without symptoms MET  4) Perform VOR Cancellation while walking without reproduction of symptoms  5) Balance on firm surface with eyes closed >/= 60 seconds without symptoms       6) Balance on soft surface with eyes closed >/= 60 seconds without symptoms  7) Tolerate visual conflict while walking without symptoms MET       PLAN  [x]  Upgrade activities as tolerated     [x]  Continue plan of care  []  Update interventions per flow sheet       []  Discharge due to:_  []  Other:_      Asaf Sargent, PT, DPT 11/29/2021        inidcated

## 2021-12-01 NOTE — PROGRESS NOTES
Physical Therapy at [facility],   a part of [facility], [address] [place] [address], [address] [place] [address]  Phone: [phone] Fax: [phone]    Progress Note    Name: Juan David Yee   : 1950   MD: Caesar Pablo MD       Treatment Diagnosis: Vertigo [R42]  Start of Care: 10/27/2021    Visits from Start of Care: 8  Missed Visits: 0    Summary of Care: Mr. Andrea Nicole is making progress overall but does still have vestibular dysfunction that is improving with treatment. Less intensity and frequency of Vertigo noted. Assessment / Recommendations:  Continue per POC.     Progress towards goals / Updated goals:  Short Term Goals: To be accomplished in 4-6 treatments:  1)  Independent with HEP MET  2) Perform Gaze Stabilization at 1 Hz in sitting for >/= 30 seconds before onset of symptoms MET   3) Perform Saccades >/= 30 seconds before onset of symptoms MET (with cardboard)  4) Perform VOR Cancellation in sitting for >/=30 seconds before onset of symptoms  5) Balance on firm surface with eyes closed >/=  45 seconds before onset of symptoms MET     Long Term Goals: To be accomplished in 8-20  treatments:  1) Perform Gaze Stabilization x1 while walking for >/= 60 seconds without symptoms  2) Perform Gaze Stabilization x2 while walking for >/= 60 seconds without symptoms  3) Perform Saccades >/= 60 seconds without symptoms MET  4) Perform VOR Cancellation while walking without reproduction of symptoms  5) Balance on firm surface with eyes closed >/= 60 seconds without symptoms       6) Balance on soft surface with eyes closed >/= 60 seconds without symptoms  7) Tolerate visual conflict while walking without symptoms MET           Mateusz Germain, PT, DPT 2021

## 2021-12-03 ENCOUNTER — HOSPITAL ENCOUNTER (OUTPATIENT)
Dept: PHYSICAL THERAPY | Age: 71
Discharge: HOME | End: 2021-12-03
Payer: MEDICARE

## 2021-12-03 PROCEDURE — 97140 MANUAL THERAPY 1/> REGIONS: CPT | Performed by: PHYSICAL THERAPIST

## 2021-12-03 PROCEDURE — 97112 NEUROMUSCULAR REEDUCATION: CPT | Performed by: PHYSICAL THERAPIST

## 2021-12-03 NOTE — PROGRESS NOTES
PT DAILY TREATMENT NOTE - [facility] 2-15    Patient Name: Richi Smith.  Date:12/3/2021  : 1950  [x]  Patient  Verified  Payor: Brett Harris / Plan: Charles Morales PPO / Product Type: PPO /    In time:1102a Out time:1155a  Total Treatment Time (min): 53  Total Timed Codes (min): 53  1:1 Treatment Time ( only): 48   Visit #:  9    Treatment Area: Vertigo [R42]    SUBJECTIVE  Pain Level (0-10 scale): 2-3/10 vertigo  Any medication changes, allergies to medications, adverse drug reactions, diagnosis change, or new procedure performed?: [x] No    [] Yes (see summary sheet for update)  Subjective functional status/changes:   [] No changes reported  Pt states that he did not feel any vertigo when he was lying down this morning. OBJECTIVE    10 min Manual Therapy: Canalith Repositioning:  [x]? See flow sheet : R liberatory and review of HEP   Rationale: treat BPPV  to improve the patients ability to fully participate in day to day activities                                                    43 min Neuromuscular Re-education:  [x]  See flow sheet : cardboard, foam balance, VOR x 1, habituation training with forward bending and visual targeting   Rationale: increase strength, improve coordination, improve balance, increase proprioception and improve vestibular performance  to improve the patients ability to balance safely and fully participate in all daily activities         With   [] TE   [] TA   [] Neuro   [] SC   [] other: Patient Education: [x] Review HEP    [] Progressed/Changed HEP based on:   [] positioning   [] body mechanics   [] transfers   [] heat/ice application    [] other:      Other Objective/Functional Measures:      Pain Level (0-10 scale) post treatment: 0/10    ASSESSMENT/Changes in Function:   No reproduction of vertigo today with testing or Liberatory maneuver. Advanced VRT with walking and head movements as well and full body rotation quickly and finding ball target.   No reproduction of dizziness with training today. Patient will continue to benefit from skilled PT services to modify and progress therapeutic interventions, address functional mobility deficits, address ROM deficits, address strength deficits, analyze and address soft tissue restrictions, analyze and cue movement patterns, analyze and modify body mechanics/ergonomics and assess and modify postural abnormalities to attain remaining goals.      []  See Plan of Care  []  See progress note/recertification  []  See Discharge Summary         Progress towards goals / Updated goals:  Short Term Goals: To be accomplished in 4-6 treatments:  1)  Independent with HEP MET  2) Perform Gaze Stabilization at 1 Hz in sitting for >/= 30 seconds before onset of symptoms MET   3) Perform Saccades >/= 30 seconds before onset of symptoms MET (with cardboard)  4) Perform VOR Cancellation in sitting for >/=30 seconds before onset of symptoms  5) Balance on firm surface with eyes closed >/=  45 seconds before onset of symptoms MET     Long Term Goals: To be accomplished in 8-20  treatments:  1) Perform Gaze Stabilization x1 while walking for >/= 60 seconds without symptoms  2) Perform Gaze Stabilization x2 while walking for >/= 60 seconds without symptoms  3) Perform Saccades >/= 60 seconds without symptoms MET  4) Perform VOR Cancellation while walking without reproduction of symptoms  5) Balance on firm surface with eyes closed >/= 60 seconds without symptoms       6) Balance on soft surface with eyes closed >/= 60 seconds without symptoms  7) Tolerate visual conflict while walking without symptoms MET       PLAN  [x]  Upgrade activities as tolerated     [x]  Continue plan of care  []  Update interventions per flow sheet       []  Discharge due to:_  []  Other:_      Claus Mabry, PT, DPT 12/3/2021        inidcated

## 2021-12-07 ENCOUNTER — HOSPITAL ENCOUNTER (OUTPATIENT)
Dept: PHYSICAL THERAPY | Age: 71
Discharge: HOME | End: 2021-12-07
Payer: MEDICARE

## 2021-12-07 PROCEDURE — 97112 NEUROMUSCULAR REEDUCATION: CPT | Performed by: PHYSICAL THERAPIST

## 2021-12-07 PROCEDURE — 97014 ELECTRIC STIMULATION THERAPY: CPT | Performed by: PHYSICAL THERAPIST

## 2021-12-07 NOTE — PROGRESS NOTES
PT DAILY TREATMENT NOTE - [facility] 2-15    Patient Name: Alvaro Barajas.  Date:2021  : 1950  [x]  Patient  Verified  Payor: [place] MEDICARE / Plan: [place] MEDICARE PART A & B / Product Type: Medicare /    In time:235p Out time: 300p  Total Treatment Time (min): 25  Total Timed Codes (min): 10  1:1 Treatment Time ( only): 10  Visit #:  10    Treatment Area: Vertigo [R42]    SUBJECTIVE  Pain Level (0-10 scale): 410 neck/back  Any medication changes, allergies to medications, adverse drug reactions, diagnosis change, or new procedure performed?: [x] No    [] Yes (see summary sheet for update)  Subjective functional status/changes:   [] No changes reported  Pt states that his neck and back have been hurting more since last session from the turning movements. He has not had any dizziness sensation > 1 week.     OBJECTIVE    Modality rationale: decrease pain to improve the patients ability to look over shoulders   Min Type Additional Details      15 [x] Estim: []Att   [x]Unatt    []TENS instruct                  [x]IFC  []Premod   []NMES                     []Other:  []w/US   []w/ice   []w/heat  Position: supine  Location: neck (MHP on back also)       []  Traction: [] Cervical       []Lumbar                       [] Prone          []Supine                       []Intermittent   []Continuous Lbs:  [] before manual  [] after manual  []w/heat    []  Ultrasound: []Continuous   [] Pulsed                       at: []1MHz   []3MHz Location:  W/cm2:    [] Paraffin         Location:   []w/heat    []  Ice     []  Heat  []  Ice massage Position:  Location:    []  Laser  []  Other: Position:  Location:      []  Vasopneumatic Device Pressure:       [] lo [] med [] hi   Temperature:      [x] Skin assessment post-treatment:  [x]intact []redness- no adverse reaction    []redness  adverse reaction:                                                  10 min Neuromuscular Re-education:  [x]  See flow sheet : review of current status and discussion of home program   Rationale: increase strength, improve coordination, improve balance, increase proprioception and improve vestibular performance  to improve the patients ability to balance safely and fully participate in all daily activities         With   [] TE   [] TA   [] Neuro   [] SC   [] other: Patient Education: [x] Review HEP    [] Progressed/Changed HEP based on:   [] positioning   [] body mechanics   [] transfers   [] heat/ice application    [] other:      Other Objective/Functional Measures:      Pain Level (0-10 scale) post treatment: 1/10    ASSESSMENT/Changes in Function:   Held VRT today and focused on e-stim and MHP to help reduce neck pain. If neck pain is not improved bu next session then will recommend consult with PCP before progressing. Patient will continue to benefit from skilled PT services to modify and progress therapeutic interventions, address functional mobility deficits, address ROM deficits, address strength deficits, analyze and address soft tissue restrictions, analyze and cue movement patterns, analyze and modify body mechanics/ergonomics and assess and modify postural abnormalities to attain remaining goals.      []  See Plan of Care  [x]  See progress note/recertification  []  See Discharge Summary         Progress towards goals / Updated goals:  Short Term Goals: To be accomplished in 4-6 treatments:  1)  Independent with HEP MET  2) Perform Gaze Stabilization at 1 Hz in sitting for >/= 30 seconds before onset of symptoms MET   3) Perform Saccades >/= 30 seconds before onset of symptoms MET (with cardboard)  4) Perform VOR Cancellation in sitting for >/=30 seconds before onset of symptoms  5) Balance on firm surface with eyes closed >/=  45 seconds before onset of symptoms MET     Long Term Goals: To be accomplished in 8-20  treatments:  1) Perform Gaze Stabilization x1 while walking for >/= 60 seconds without symptoms  2) Perform Gaze Stabilization x2 while walking for >/= 60 seconds without symptoms  3) Perform Saccades >/= 60 seconds without symptoms MET  4) Perform VOR Cancellation while walking without reproduction of symptoms  5) Balance on firm surface with eyes closed >/= 60 seconds without symptoms       6) Balance on soft surface with eyes closed >/= 60 seconds without symptoms  7) Tolerate visual conflict while walking without symptoms MET       PLAN  [x]  Upgrade activities as tolerated     [x]  Continue plan of care  []  Update interventions per flow sheet       []  Discharge due to:_  []  Other:_      Marielle Estevez, PT, DPT 12/7/2021        inidcated

## 2021-12-09 ENCOUNTER — HOSPITAL ENCOUNTER (OUTPATIENT)
Dept: PHYSICAL THERAPY | Age: 71
Discharge: HOME | End: 2021-12-09
Payer: MEDICARE

## 2021-12-09 PROCEDURE — 97014 ELECTRIC STIMULATION THERAPY: CPT | Performed by: PHYSICAL THERAPIST

## 2021-12-09 NOTE — PROGRESS NOTES
Physical Therapy at [facility],   a part of [address] [place] [address] [facility], [address] [place] [address], [facility]  Phone: [phone] Fax: [phone]      Discharge Summary 2-15      Patient name: Sydney Husain  : 1950  Provider#: [id number]  Referral source: Saundra Harvey MD      Medical/Treatment Diagnosis: Vertigo [R42]     Prior Hospitalization: see medical history     Comorbidities: chronic history of vertigo  Prior Level of Function: complete 20 minutes of exercise seldom or never; 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  Medications: Verified on Patient Summary List  Start of Care: 10/27/2021                                                          Onset Date: 10/1/2021 acute exacerbation of BPPV, >20 chronic history              Visits from Start of Care: 11     Missed Visits: 0  Reporting Period : 10/27/2021 to 2021    Progress towards goals / Updated goals:  Short Term Goals: To be accomplished in 4-6 treatments:  1)  Independent with HEP MET  2) Perform Gaze Stabilization at 1 Hz in sitting for >/= 30 seconds before onset of symptoms MET   3) Perform Saccades >/= 30 seconds before onset of symptoms MET (with cardboard)  4) Perform VOR Cancellation in sitting for >/=30 seconds before onset of symptomsMET  5) Balance on firm surface with eyes closed >/=  45 seconds before onset of symptoms MET     Long Term Goals: To be accomplished in 8-20  treatments:  1) Perform Gaze Stabilization x1 while walking for >/= 60 seconds without symptoms MET  2) Perform Gaze Stabilization x2 while walking for >/= 60 seconds without symptoms MET  3) Perform Saccades >/= 60 seconds without symptoms MET  4) Perform VOR Cancellation while walking without reproduction of symptoms MET  5) Balance on firm surface with eyes closed >/= 60 seconds without symptoms   MET    6) Balance on soft surface with eyes closed >/= 60 seconds without symptoms MET  7) Tolerate visual conflict while walking without symptoms MET       Assessment/Summary of care:  Mr. Amos Factor states that his neck is doing better but not back to normal.  No dizziness present and has demonstrated negative [place]-Hallpike testing. No reproduction of dizziness with normalization of VOR, VOR cancellation and balance activity. Recommend treatment for cervical/lumbar disfunction.   He has been instructed in specific self correction technique (Liberatory and Eply Maneuvers) for correction of BPPV when present.           RECOMMENDATIONS:  [x]Discontinue therapy: [x]Patient has reached or is progressing toward set goals     []Patient is non-compliant or has abdicated     []Due to lack of appreciable progress towards set goals    Nilson Duffy, PT, DPT 12/9/2021

## 2021-12-09 NOTE — PROGRESS NOTES
PT DAILY TREATMENT NOTE - [facility] 2-15    Patient Name: Gasper Goodson.  Date:2021  : 1950  [x]  Patient  Verified  Payor: [place] MEDICARE / Plan: VA MEDICARE PART A & B / Product Type: Medicare /    In time: 512T Out time: 350p  Total Treatment Time (min): 20  Total Timed Codes (min): 5  1:1 Treatment Time ( only): 5  Visit #:  11    Treatment Area: Vertigo [R42]    SUBJECTIVE  Pain Level (0-10 scale): 0/10 vertigo  Any medication changes, allergies to medications, adverse drug reactions, diagnosis change, or new procedure performed?: [x] No    [] Yes (see summary sheet for update)  Subjective functional status/changes:   [] No changes reported  Pt states that his neck is doing better but not back to normal.  No dizziness present.     OBJECTIVE    Modality rationale: decrease pain to improve the patients ability to look over shoulders   Min Type Additional Details      15 [x] Estim: []Att   [x]Unatt    []TENS instruct                  [x]IFC  []Premod   []NMES                     []Other:  []w/US   []w/ice   []w/heat  Position: supine  Location: neck (MHP on back also)       []  Traction: [] Cervical       []Lumbar                       [] Prone          []Supine                       []Intermittent   []Continuous Lbs:  [] before manual  [] after manual  []w/heat    []  Ultrasound: []Continuous   [] Pulsed                       at: []1MHz   []3MHz Location:  W/cm2:    [] Paraffin         Location:   []w/heat    []  Ice     []  Heat  []  Ice massage Position:  Location:    []  Laser  []  Other: Position:  Location:      []  Vasopneumatic Device Pressure:       [] lo [] med [] hi   Temperature:      [x] Skin assessment post-treatment:  [x]intact []redness- no adverse reaction    []redness  adverse reaction:                                                  5 min Neuromuscular Re-education:  [x]  See flow sheet : review of current status    Rationale: increase strength, improve coordination, improve balance, increase proprioception and improve vestibular performance  to improve the patients ability to balance safely and fully participate in all daily activities         With   [] TE   [] TA   [] Neuro   [] SC   [] other: Patient Education: [x] Review HEP    [] Progressed/Changed HEP based on:   [] positioning   [] body mechanics   [] transfers   [] heat/ice application    [] other:      Other Objective/Functional Measures:      Pain Level (0-10 scale) post treatment: 1/10    ASSESSMENT/Changes in Function:   Full resolution of vertigo and dizziness at this time. Plan to discharge for vestibular rehab therapy and recommend evaluation for neck and back.      []  See Plan of Care  []  See progress note/recertification  [x]  See Discharge Summary         Progress towards goals / Updated goals:  Short Term Goals: To be accomplished in 4-6 treatments:  1)  Independent with HEP MET  2) Perform Gaze Stabilization at 1 Hz in sitting for >/= 30 seconds before onset of symptoms MET   3) Perform Saccades >/= 30 seconds before onset of symptoms MET (with cardboard)  4) Perform VOR Cancellation in sitting for >/=30 seconds before onset of symptomsMET  5) Balance on firm surface with eyes closed >/=  45 seconds before onset of symptoms MET     Long Term Goals: To be accomplished in 8-20  treatments:  1) Perform Gaze Stabilization x1 while walking for >/= 60 seconds without symptoms MET  2) Perform Gaze Stabilization x2 while walking for >/= 60 seconds without symptoms MET  3) Perform Saccades >/= 60 seconds without symptoms MET  4) Perform VOR Cancellation while walking without reproduction of symptoms MET  5) Balance on firm surface with eyes closed >/= 60 seconds without symptoms   MET    6) Balance on soft surface with eyes closed >/= 60 seconds without symptoms MET  7) Tolerate visual conflict while walking without symptoms MET       PLAN  []  Upgrade activities as tolerated     []  Continue plan of care  []  Update interventions per flow sheet       [x]  Discharge due to:_ completion of goals  []  Other:_      Blaze Limon, PT, DPT 12/9/2021        inidcated

## 2021-12-13 ENCOUNTER — APPOINTMENT (OUTPATIENT)
Dept: PHYSICAL THERAPY | Age: 71
End: 2021-12-13
Payer: MEDICARE

## 2021-12-15 ENCOUNTER — APPOINTMENT (OUTPATIENT)
Dept: PHYSICAL THERAPY | Age: 71
End: 2021-12-15
Payer: MEDICARE

## 2021-12-15 ENCOUNTER — OFFICE VISIT (OUTPATIENT)
Dept: INTERNAL MEDICINE CLINIC | Age: 71
End: 2021-12-15
Payer: MEDICARE

## 2021-12-15 VITALS
RESPIRATION RATE: 16 BRPM | HEIGHT: 69 IN | DIASTOLIC BLOOD PRESSURE: 92 MMHG | BODY MASS INDEX: 25.27 KG/M2 | TEMPERATURE: 98.3 F | OXYGEN SATURATION: 97 % | WEIGHT: 170.6 LBS | SYSTOLIC BLOOD PRESSURE: 167 MMHG | HEART RATE: 75 BPM

## 2021-12-15 DIAGNOSIS — M54.50 ACUTE BILATERAL LOW BACK PAIN WITHOUT SCIATICA: Primary | ICD-10-CM

## 2021-12-15 DIAGNOSIS — E78.5 HYPERLIPIDEMIA, UNSPECIFIED HYPERLIPIDEMIA TYPE: ICD-10-CM

## 2021-12-15 DIAGNOSIS — M54.2 NECK PAIN: ICD-10-CM

## 2021-12-15 LAB
CHOLEST SERPL-MCNC: 294 MG/DL
HDLC SERPL-MCNC: 81 MG/DL
HDLC SERPL: 3.6 {RATIO} (ref 0–5)
LDLC SERPL CALC-MCNC: 183.4 MG/DL (ref 0–100)
TRIGL SERPL-MCNC: 148 MG/DL (ref ?–150)
VLDLC SERPL CALC-MCNC: 29.6 MG/DL

## 2021-12-15 PROCEDURE — G8536 NO DOC ELDER MAL SCRN: HCPCS | Performed by: FAMILY MEDICINE

## 2021-12-15 PROCEDURE — G8510 SCR DEP NEG, NO PLAN REQD: HCPCS | Performed by: FAMILY MEDICINE

## 2021-12-15 PROCEDURE — 99214 OFFICE O/P EST MOD 30 MIN: CPT | Performed by: FAMILY MEDICINE

## 2021-12-15 PROCEDURE — G8427 DOCREV CUR MEDS BY ELIG CLIN: HCPCS | Performed by: FAMILY MEDICINE

## 2021-12-15 PROCEDURE — 1101F PT FALLS ASSESS-DOCD LE1/YR: CPT | Performed by: FAMILY MEDICINE

## 2021-12-15 PROCEDURE — 3017F COLORECTAL CA SCREEN DOC REV: CPT | Performed by: FAMILY MEDICINE

## 2021-12-15 PROCEDURE — G8419 CALC BMI OUT NRM PARAM NOF/U: HCPCS | Performed by: FAMILY MEDICINE

## 2021-12-15 NOTE — PROGRESS NOTES
Chief Complaint   Patient presents with   [address] PAIN     Patient is here for low back pain    Neck Pain     he is a 70y.o. year old male who presents for evaluation of neck and back pain   Pain Assessment Encounter      Azul Morales Sr.  12/15/2021  Onset of Symptoms: Patient states he has had back and neck pain for weeks  ________________________________________________________________________  Description:Patient states he injured back while in PT    Frequency: 5 times a day  Pain Scale:(1-10): 4  Trauma Hx: none  Hx of similar symptoms: No:   Radiation: NO, low back and neck  Duration:  continuous      Progression: has worsened  What makes it better?: heat and OTC meds  What makes it worse?:exercise, strecthing and walking  Medications tried: ibuprofen    Patient also believes that he felt a little dizzy only took the cholesterol medication so he stopped taking this about 3 months ago. Patient states that he has been working on diet and exercise. He is open to retrying the cholesterol medication if his cholesterol is still high. Denies any chest pain shortness of breath    Reviewed and agree with Nurse Note and duplicated in this note. Reviewed PmHx, RxHx, FmHx, SocHx, AllgHx and updated and dated in the chart. No family history on file. No past medical history on file. Social History     Socioeconomic History    Marital status:    Tobacco Use    Smoking status: Current Some Day Smoker     Types: Cigars    Smokeless tobacco: Current User   Substance and Sexual Activity    Alcohol use:  Yes     Alcohol/week: 2.0 standard drinks     Types: 2 Glasses of wine per week    Drug use: No    Sexual activity: Yes        Review of Systems - negative except as listed above      Objective:     Vitals:    12/15/21 0916   BP: (!) 151/82   Pulse: 75   Resp: 16   Temp: 98.3 °F (36.8 °C)   SpO2: 97%   Weight: 170 lb 9.6 oz (77.4 kg)   Height: 5' 9\" (1.753 m)       Physical Examination: General appearance - alert, well appearing, and in no distress  Chest - clear to auscultation, no wheezes, rales or rhonchi, symmetric air entry  Heart - normal rate, regular rhythm, normal S1, S2, no murmurs, rubs, clicks or gallops  Abdomen - soft, nontender, nondistended, no masses or organomegaly  Neck -C-spine tenderness noted at C6, no pain to palpation of upper traps, negative Spurling bilaterally  Back exam - pain with motion noted during exam, tenderness noted left lower lumbar, positive straight-leg raise , normal reflexes and strength bilateral lower extremities, sensory exam intact bilateral lower extremities  Neurological - alert, oriented, normal speech, no focal findings or movement disorder noted  Musculoskeletal - no joint tenderness, deformity or swelling  Extremities - peripheral pulses normal, no pedal edema, no clubbing or cyanosis  Skin - normal coloration and turgor, no rashes, no suspicious skin lesions noted     Assessment/ Plan:   Diagnoses and all orders for this visit:    1. Acute bilateral low back pain without sciatica  -     XR SPINE LUMB 2 OR 3 V; Future  -     REFERRAL TO PHYSICAL THERAPY    2. Neck pain  -     XR SPINE CERV 4 OR 5 V; Future  -     REFERRAL TO PHYSICAL THERAPY    3. Hyperlipidemia, unspecified hyperlipidemia type  -     LIPID PANEL; Future    Patient will restart statin medication return to clinic in 3 months if his cholesterol still elevated    Pathophysiology, recovery and rehabilitation process discussed and questions answered   Counseling for 30 Minutes of the total visit duration   Pictures and figures used as necessary   Provided reassurance   Recommend activity modification   Recommend  lower impact activities-walking, Eliptical, Nordic Track, cycling or swimming   Follow up in 4 week(s)             1) Remember to stay active and/or exercise regularly (I suggest 30-45 minutes daily)   2) For reliable dietary information, go to www. [employer]AT[employer].org. You may wish to consider seeing the nutritionist at [facility] [phone], also consider the [id number] I have discussed the diagnosis with the patient and the intended plan as seen in the above orders. The patient has received an after-visit summary and questions were answered concerning future plans. Medication Side Effects and Warnings were discussed with patient,  Patient Labs were reviewed and or requested, and  Patient Past Records were reviewed and or requested  yes      Pt agrees to call or return to clinic and/or go to closest ER with any worsening of symptoms. This may include, but not limited to increased fever (>100.4) with NSAIDS or Tylenol, increased edema, confusion, rash, worsening of presenting symptoms. Please note that this dictation was completed with [employer], the computer voice recognition software. Quite often unanticipated grammatical, syntax, homophones, and other interpretive errors are inadvertently transcribed by the computer software. Please disregard these errors. Please excuse any errors that have escaped final proofreading. Thank you.

## 2021-12-16 ENCOUNTER — OFFICE VISIT (OUTPATIENT)
Dept: NEUROLOGY | Age: 71
End: 2021-12-16
Payer: MEDICARE

## 2021-12-16 VITALS
RESPIRATION RATE: 14 BRPM | BODY MASS INDEX: 25.18 KG/M2 | SYSTOLIC BLOOD PRESSURE: 138 MMHG | HEART RATE: 69 BPM | OXYGEN SATURATION: 98 % | HEIGHT: 69 IN | WEIGHT: 170 LBS | DIASTOLIC BLOOD PRESSURE: 86 MMHG

## 2021-12-16 DIAGNOSIS — H81.10 BENIGN PAROXYSMAL POSITIONAL VERTIGO, UNSPECIFIED LATERALITY: Primary | ICD-10-CM

## 2021-12-16 PROCEDURE — 3017F COLORECTAL CA SCREEN DOC REV: CPT | Performed by: PSYCHIATRY & NEUROLOGY

## 2021-12-16 PROCEDURE — G8419 CALC BMI OUT NRM PARAM NOF/U: HCPCS | Performed by: PSYCHIATRY & NEUROLOGY

## 2021-12-16 PROCEDURE — 1101F PT FALLS ASSESS-DOCD LE1/YR: CPT | Performed by: PSYCHIATRY & NEUROLOGY

## 2021-12-16 PROCEDURE — G8427 DOCREV CUR MEDS BY ELIG CLIN: HCPCS | Performed by: PSYCHIATRY & NEUROLOGY

## 2021-12-16 PROCEDURE — 99203 OFFICE O/P NEW LOW 30 MIN: CPT | Performed by: PSYCHIATRY & NEUROLOGY

## 2021-12-16 PROCEDURE — G8510 SCR DEP NEG, NO PLAN REQD: HCPCS | Performed by: PSYCHIATRY & NEUROLOGY

## 2021-12-16 PROCEDURE — G8536 NO DOC ELDER MAL SCRN: HCPCS | Performed by: PSYCHIATRY & NEUROLOGY

## 2021-12-16 RX ORDER — LATANOPROST 50 UG/ML
SOLUTION/ DROPS OPHTHALMIC
COMMUNITY
Start: 2021-04-07

## 2021-12-16 NOTE — PROGRESS NOTES
[facility]   NEW PATIENT EVALUATION/CONSULTATION       PATIENT NAME: Richi Smith. MRN: [id number]    REASON FOR CONSULTATION: Vertigo    12/16/21    HISTORY OF PRESENT ILLNESS:  Richi Smith is a 60-year-old right-hand-dominant male presents to [facility] neurology clinic as referral from primary care physician for evaluation of vertigo. Patient reports that about a month ago he woke up from bed with severe vertigo and that the whole room was spinning. Was seen by his primary care recommended for therapy and given meclizine. Said he took the meclizine but made him feel worse it did not do this anymore. Has noted that with physical therapy symptoms have largely improved. He sometimes will feel the initial sensation/premonitory symptoms of vertigo but never the anne occurrence of vertigo. He also notes some degree of ongoing balance impairment feeling as if he leans to the left. Denies any other neurologic symptoms at the time of the episode, before it or since that time. He does have history of cervical and back degenerative changes but does not appear to endorse symptoms occurring strictly when flexing the head. Symptoms more prominent when rolling over in bed. Does note that on and off throughout the years he would have milder degrees of the symptoms but never full-blown thing. PAST MEDICAL HISTORY:  No contributory past medical history is noted    PAST SURGICAL HISTORY:  Past Surgical History:   Procedure Laterality Date    HX ORTHOPAEDIC         FAMILY HISTORY:   Family history appears noncontributory      SOCIAL HISTORY:  Social History     Socioeconomic History    Marital status:    Tobacco Use    Smoking status: Current Some Day Smoker     Types: Cigars    Smokeless tobacco: Current User   Substance and Sexual Activity    Alcohol use:  Yes     Alcohol/week: 2.0 standard drinks     Types: 2 Glasses of wine per week     Comment: 2 at most    Drug use: No    Sexual activity: Yes         MEDICATIONS:   Current Outpatient Medications   Medication Sig Dispense Refill    latanoprost (XALATAN) 0.005 % ophthalmic solution INSTILL 1 DROP IN BOTH EYES ONCE DAILY      rosuvastatin (CRESTOR) 20 mg tablet Take 1 Tablet by mouth nightly. 90 Tablet 1    fluticasone (FLONASE) 50 mcg/actuation nasal spray 2 Sprays by Both Nostrils route daily.  efinaconazole (JUBLIA) erlinda topical solution Apply  to affected area daily.  Cholecalciferol, Vitamin D3, (VITAMIN D3) 2,000 unit cap capsule Take  by mouth two (2) times a day.  omega-3 fatty acids-vitamin e (FISH OIL) 1,000 mg cap Take 1 Cap by mouth.  ascorbic acid, vitamin C, (VITAMIN C) 500 mg tablet Take 1,000 mg by mouth.  glucosamine-chondroitin (ELATION) 1,500-1,200 mg/30 mL liqd Take  by mouth.  TRAVATAN Z 0.004 % ophthalmic solution INSTILL 1 DROP INTO BOTH EYES IN THE EVENING (Patient not taking: Reported on 12/16/2021)  6         ALLERGIES:  No Known Allergies      REVIEW OF SYSTEMS:  10 point ROS reviewed with patient. Please see scanned document under media. PHYSICAL EXAM:  Vital Signs:   Visit Vitals  /86 (BP 1 Location: Right arm, BP Patient Position: Sitting)   Pulse 69   Resp 14   Ht 5' 9\" (1.753 m)   Wt 170 lb (77.1 kg)   SpO2 98%   BMI 25.10 kg/m²     Pleasant male resting notably in exam room in no distress. HEENT appears grossly unremarkable neck appears supple. Cardiovascular demonstrates constant S1/S2 pulmonary demonstrates equal entry bilaterally. Extremities are warm/dry. Neurologically patient appears alert and oriented attention appears intact. Speech is clear, language fluent. Cranial nerves II through XII appear grossly unremarkable, there is no nystagmus. Monica-Hallpike demonstrates symptoms without nystagmus or anne vertigo. Motorically patient has normal bulk and tone, 5 5 strength in upper and lower extremities. Sensation is grossly intact. Coordination is intact in upper extremities, there is no tremor at rest, with posture or intention. No dysmetria. Reflexes are symmetric throughout. Primary gait and station is unremarkable. Tandem gait is completed with some difficulty. PERTINENT DATA:  MRI Results (maximum last 3): Results from [place] encounter on 10/11/19    MRI HAND LT WO CONT    Narrative  INDICATION:  Mass or lump, hand; left 4th metacarpal mass    EXAM: MRI left hand without contrast. Sequences include multiplanar T1 and  T2-weighted gradient echo images. Comparison 10/2/2019    FINDINGS: A marker was placed at the palpable abnormality. There is focal  thickening of the subcutaneous tissues involving the palmar fascia. This is  isointense to muscle on T1 and hypointense on T2-weighted images. It measures  approximately 15 x 14 x 3 mm. There is subtle linear soft tissue extending  towards the flexor tendon sheath of the fourth digit at the level of the fourth  metacarpal head. There is no tenosynovitis. No tendon tear. Remaining muscles  are normal in appearance. There is no evidence of stress reaction or fracture. There are degenerative changes of the first MTP joint compartment  interphalangeal joint. Impression  IMPRESSION:  1. Soft tissue mass has imaging characteristics consistent with a Dupuytren  contracture      Results from Hospital Encounter encounter on 06/04/18    MRI ANKLE RT WO CONT    Narrative  EXAM:  MRI ANKLE RT WO CONT    INDICATION: Right ankle pain. Pain with walking upstairs. COMPARISON: None. TECHNIQUE: Axial T2 and proton density fat-saturated; coronal T2 fat-saturated;  sagittal T1, T2 fat-saturated, and spoiled gradient-echo MRI of the right ankle. CONTRAST: None. FINDINGS: Bone Marrow: There are subchondral degenerative changes at the  junction of the medial malleolus and tibial plafond, degenerative.  There is an  osteochondral defect of the medial talar dome measuring approximately 8 x 7 mm. There is no subchondral collapse. There is minimal subchondral cyst formation. There is no evidence of stress reaction or fracture    Joint fluid: None. Tendons: There is a longitudinal split tear of the peroneus brevis at and distal  to the lateral malleolus but there is no fluid within the tendon sheath. There  is a low-lying peroneus brevis muscle body. Remaining ankle tendons are intact    Muscles: Within normal limits. Lateral ligaments: intact. Deltoid and spring ligaments: intact. Sinus tarsi: within normal limits. Plantar fascia: Within normal limits. Articular cartilage: See above No evidence of coalition. Soft tissue mass: None. Impression  IMPRESSION:  1. 7 x 8 mm osteochondral defect medial talar dome  2. Subchondral degenerative changes tibial plafond at its junction with the  medial malleolus due to fissuring of the overlying cartilage  3. Incidental longitudinal split tear peroneus brevis at and distal to the  lateral malleolus however there is no subluxation or fluid within the peroneal  tendon sheath      Results from Hospital Encounter encounter on 12/12/17    MRI LUMB SPINE WO CONT    Narrative  INDICATION:  Low back pain with left lower extremity radiculopathy    EXAMINATION:  MRI LUMBAR SPINE without CONTRAST    COMPARISON: 2/25/2006    TECHNIQUE: MR imaging of the lumbar spine was performed with sagittal T1, T2,  STIR;  axial T1, T2.  NO CONTRAST ADMINISTERED    FINDINGS:    No significant lumbosacral malalignment. Slightly progressive moderate to severe  L4-5 degenerative disc disease. Progressive mild to moderate L2-3 and L3-4  degenerative disc height loss. No evidence for acute fracture or focal  destructive bone marrow process. Endplate signal abnormalities and edema at L4-5  are likely degenerative and reactive.  No abnormality of the distal spinal cord,  conus medullaris, or cauda equina    T12-L1: No significant disc abnormality, central spinal canal stenosis, or  neural foraminal stenosis. L1/2: No significant disc abnormality, central spinal canal stenosis, or neural  foraminal stenosis. Mild to moderate facet arthropathy    L2/3: Small disc osteophyte complex without any central stenosis. Mild left and  no right neural foraminal narrowing. Mild to moderate facet arthropathy    L3/4: Disc osteophyte complex without any central stenosis. Mild left and no  right neural foraminal stenosis. Mild to moderate facet arthropathy    L4/5: Disc osteophyte complex, facet arthropathy, and ligamentum flavum  hypertrophy causing no significant central stenosis. Mild right and no left  neural foraminal narrowing    L5/S1: No significant disc abnormality, central spinal canal stenosis, or neural  foraminal stenosis. Multiple T2 hyperintense bilateral renal and hepatic lesions are likely cysts    Impression  IMPRESSION:    Slightly progressive multilevel degenerative disc disease without any central  stenosis.  Mild multilevel neural foraminal narrowing as described above      ASSESSMENT:      Devonte Monique is a 70year old RH dominant male who presents to [facility] neurology clinic for evaluation and management of suspected peripheral vertigo likely secondary to BPPV which is resolving    PLAN:  Vertigo:  Appears provide a pretty consistent history for benign paroxysmal positional vertigo  Lingering on appropriate treatment with referral to physical therapy which has had early benefits  Encourage patient to continue with this and discussed balance impairment with a therapist as well  No further recommendations at this time encourage patient to call us back if symptoms flareup without response to recommended treatments    Greater than 30 minutes were spent reviewing patient data, interview examining patient, formulating plan of care as well as with documentation in a 24-hour period      Joanie Allred MD       CC Referring provider:  Anish Ulloa MD  [place] [address],  [id number]    Jarad Murillo MD

## 2021-12-16 NOTE — PROGRESS NOTES
Chief Complaint   Patient presents with    New Patient     Room 15// Vertigo// dc'ed Meclizine- \"makes me feel worse\" // No previous imaging done // Has started PT @ [facility] - twice weekly \"really helps\"     Presents alone today.

## 2021-12-17 ENCOUNTER — APPOINTMENT (OUTPATIENT)
Dept: PHYSICAL THERAPY | Age: 71
End: 2021-12-17
Payer: MEDICARE

## 2021-12-23 ENCOUNTER — HOSPITAL ENCOUNTER (OUTPATIENT)
Dept: PHYSICAL THERAPY | Age: 71
Discharge: HOME | End: 2021-12-23
Attending: FAMILY MEDICINE
Payer: MEDICARE

## 2021-12-23 DIAGNOSIS — M54.2 NECK PAIN: ICD-10-CM

## 2021-12-23 DIAGNOSIS — M54.50 ACUTE BILATERAL LOW BACK PAIN WITHOUT SCIATICA: ICD-10-CM

## 2021-12-23 PROCEDURE — 97110 THERAPEUTIC EXERCISES: CPT | Performed by: PHYSICAL THERAPIST

## 2021-12-23 PROCEDURE — 97161 PT EVAL LOW COMPLEX 20 MIN: CPT | Performed by: PHYSICAL THERAPIST

## 2021-12-23 NOTE — PROGRESS NOTES
PT INITIAL EVALUATION NOTE - [facility] 2-15    Patient Name: Florecita Fernandez Sr.  Date:2021  : 1950  [x]  Patient  Verified  Payor: [employer] / Plan: [place] MEDICARE PART A & B / Product Type: Medicare /    In time: 4928B  Out time:  1213p  Total Treatment Time (min): 60  Total Timed Codes (min): 10  1:1 Treatment Time ( only): 10   Visit #: 1     Treatment Area: Acute bilateral low back pain without sciatica [M54.50]  Neck pain [M54.2]    SUBJECTIVE  Pain Level (0-10 scale): 10  Any medication changes, allergies to medications, adverse drug reactions, diagnosis change, or new procedure performed?: [] No    [x] Yes (see summary sheet for update)  Subjective:    Original onset of back pain in  related to [address] [place] [address] as  and has had recurring episodes since then. He has had increase of level if irritability since 2019 as his activity has reduced since then. Pain is more centrally located in his back and he does have some radiating pain/tingling into the left leg and plantar surface of the foot. Pain is worse with sitting > 5 minutes, bending, twisting and lifting. Occasional intermittent cervical pain and occasional C8/ulnar nerve distribution numbness/tingling worse upon waking in AM then will resolve once moving around. OBJECTIVE    Posture:  Sitting upright in chair  Other Observations:  --  Gait and Functional Mobility:  Increased lumbar pain with bridge today;   Palpation: tenderness bilateral cervical and lumbar paraspinal muscles        Cervical AROM:        R  L    Flexion    40 P!  --    Extension   30 P!  --    Side Bending   22  18    Rotation   70 P!  65 P! Lumbar AROM:          R  L    Flexion    50 P! L  --    Extension   15 P!  --    Side Bending   15 P!  10 P! L    Rotation   30 P! L  30 P!  L         UPPER QUARTER   MUSCLE STRENGTH  KEY       R  L  0 - No Contraction  C1, C2 Neck Flex 5  5  1 - Trace   C3 Side Flex  5  5  2 - Poor   C4 Sh Elev  5  5  3 - Fair    C5 Deltoid/Biceps 5  5  4 - Good   C6 Wrist Ext  5  5  5 - Normal   C7 Triceps  5  5      C8 Thumb Ext  5  5      T1 Hand Inst  5  5    LOWER QUARTER   MUSCLE STRENGTH  KEY       R  L  0 - No Contraction  L1, L2 Psoas  5  5  1 - Trace   L3 Quads  5  5  2 - Poor   L4 Tib Ant  5  5  3 - Fair    L5 EHL  5  5  4 - Good   S1 Peroneals  5  5  5 - Normal   S2 Hams  5  5    Flexibility: slight hamstring stiffness bilaterally   Mobility Assessment: C1 Right lateral glide hypomobility, C5/6-C6/& right facet downslided hypomobility      Neurological: Reflexes / Sensations: normal  Special Tests: Cervical Distraction: no change  Cervical Compression: no change    Spurling Test: negative    Forward Bend: positive    Slump: negative         10 min Therapeutic Exercise:  [x] See flow sheet : demonstration and preparation of HEP   Rationale: increase ROM, increase strength, improve coordination, improve balance and increase proprioception to improve the patients ability to maintain core stabilization with activity            With   [x] TE   [] TA   [] Neuro   [] SC   [] other: Patient Education: [x] Review HEP    [] Progressed/Changed HEP based on:   [] positioning   [] body mechanics   [] transfers   [] heat/ice application    [] other:      Other Objective/Functional Measures: FOTO Functional Measure: 48/100                       Pain Level (0-10 scale) post treatment: 3/10    ASSESSMENT/Changes in Function:     [x]  See Plan of Care      Gustavo Rea PT, DPT 12/23/2021

## 2021-12-23 NOTE — PROGRESS NOTES
Physical Therapy at [facility],   a part of [facility] Tiffany Dubois  [place] [facility], [address] [place] [address], [address]  Phone: [phone]  Fax: [phone]      Plan of Care/Statement of Necessity for Physical Therapy Services  2-15    Patient name: Felisha Saenz.  : 1950  Provider#: [id number]  Referral source: Elvira Lawrence MD      Medical/Treatment Diagnosis: Acute bilateral low back pain without sciatica [M54.50]  Neck pain [M54.2]     Prior Hospitalization: see medical history     Comorbidities: chronic back pain, recurring BPPV and vestibular hypofunction  Prior Level of Function: complete 20 minutes of exercise at least 3 times a week  Medications: Verified on Patient Summary List  Start of Care: 2021      Onset Date:  with acute exacerbation 2 weeks ago   The Plan of Care and following information is based on the information from the initial evaluation. Assessment/ lazo information: 70 y.o. male with chronic lumbar and cervical pain with recent flare-up approx 2 weeks ago. Presents with reduced cervical mobility and reduced cervical and lumbar muscle performance.     Evaluation Complexity History HIGH Complexity :3+ comorbidities / personal factors will impact the outcome/ POC ; Examination LOW Complexity : 1-2 Standardized tests and measures addressing body structure, function, activity limitation and / or participation in recreation  ;Presentation LOW Complexity : Stable, uncomplicated  ;Clinical Decision Making MEDIUM Complexity : FOTO score of 26-74  Overall Complexity Rating: LOW     Problem List: pain affecting function, decrease ROM, decrease strength, impaired gait/ balance, decrease ADL/ functional abilitiies, decrease activity tolerance and decrease flexibility/ joint mobility   Treatment Plan may include any combination of the following: Therapeutic exercise, Therapeutic activities, Neuromuscular re-education, Physical agent/modality, Gait/balance training, Manual therapy, Patient education and Self Care training  Patient / Family readiness to learn indicated by: asking questions and trying to perform skills  Persons(s) to be included in education: patient (P)  Barriers to Learning/Limitations: None  Patient Goal (s): reduce back pain and improve management strategies  Patient Self Reported Health Status: good  Rehabilitation Potential: good    Short Term Goals: To be accomplished in 4-6 treatments:  1) Pt will be Independent with HEP  2) Pt will be able to Sit greater than 10 minutes without pain  3) Pt will be able to Ambulate greater than 8-12 block without increase of pain    Long Term Goals: To be accomplished in 8-12 treatments:  1)  Pt will be able to Sit greater than 30 minutes without pain  2) Pt will be able to Stand greater than 60 minutes without increase of pain  3) Pt will be able to Ambulate greater than 1 mile without increase of pain  4) Pt will be able to retrieve item form ground without pain  5) Pt will be able to carry >/= 30 lbs without pain      Frequency / Duration: Patient to be seen 2 times per week for 8-12 treatments. Patient/ Caregiver education and instruction: activity modification and exercises    [x]  Plan of care has been reviewed with PTA      Certification Period: 12/23/2021 - 3/23/2022  Mert Blunt, PT, DPT 12/23/2021     ________________________________________________________________________    I certify that the above Therapy Services are being furnished while the patient is under my care. I agree with the treatment plan and certify that this therapy is necessary.     [de-identified] Signature:____________________  Date:____________Time: _________         Emilia Carmichael MD

## 2021-12-30 ENCOUNTER — APPOINTMENT (OUTPATIENT)
Dept: PHYSICAL THERAPY | Age: 71
End: 2021-12-30
Payer: MEDICARE

## 2022-01-03 ENCOUNTER — APPOINTMENT (OUTPATIENT)
Dept: PHYSICAL THERAPY | Age: 72
End: 2022-01-03

## 2022-01-06 ENCOUNTER — APPOINTMENT (OUTPATIENT)
Dept: PHYSICAL THERAPY | Age: 72
End: 2022-01-06

## 2022-01-14 ENCOUNTER — APPOINTMENT (OUTPATIENT)
Dept: PHYSICAL THERAPY | Age: 72
End: 2022-01-14

## 2022-01-26 ENCOUNTER — OFFICE VISIT (OUTPATIENT)
Dept: INTERNAL MEDICINE CLINIC | Age: 72
End: 2022-01-26
Payer: MEDICARE

## 2022-01-26 VITALS
DIASTOLIC BLOOD PRESSURE: 80 MMHG | BODY MASS INDEX: 24.88 KG/M2 | RESPIRATION RATE: 16 BRPM | HEIGHT: 69 IN | HEART RATE: 75 BPM | SYSTOLIC BLOOD PRESSURE: 154 MMHG | WEIGHT: 168 LBS | OXYGEN SATURATION: 99 % | TEMPERATURE: 98 F

## 2022-01-26 DIAGNOSIS — M54.2 NECK PAIN: Primary | ICD-10-CM

## 2022-01-26 DIAGNOSIS — G89.29 CHRONIC MIDLINE LOW BACK PAIN WITHOUT SCIATICA: ICD-10-CM

## 2022-01-26 DIAGNOSIS — M54.50 CHRONIC MIDLINE LOW BACK PAIN WITHOUT SCIATICA: ICD-10-CM

## 2022-01-26 PROCEDURE — 3017F COLORECTAL CA SCREEN DOC REV: CPT | Performed by: FAMILY MEDICINE

## 2022-01-26 PROCEDURE — 99214 OFFICE O/P EST MOD 30 MIN: CPT | Performed by: FAMILY MEDICINE

## 2022-01-26 PROCEDURE — G8432 DEP SCR NOT DOC, RNG: HCPCS | Performed by: FAMILY MEDICINE

## 2022-01-26 PROCEDURE — G8420 CALC BMI NORM PARAMETERS: HCPCS | Performed by: FAMILY MEDICINE

## 2022-01-26 PROCEDURE — G8536 NO DOC ELDER MAL SCRN: HCPCS | Performed by: FAMILY MEDICINE

## 2022-01-26 PROCEDURE — G8428 CUR MEDS NOT DOCUMENT: HCPCS | Performed by: FAMILY MEDICINE

## 2022-01-26 PROCEDURE — 1101F PT FALLS ASSESS-DOCD LE1/YR: CPT | Performed by: FAMILY MEDICINE

## 2022-01-26 RX ORDER — PREDNISONE 20 MG/1
20 TABLET ORAL 3 TIMES DAILY
Qty: 21 TABLET | Refills: 0 | Status: SHIPPED | OUTPATIENT
Start: 2022-01-26 | End: 2022-02-02

## 2022-01-26 NOTE — PROGRESS NOTES
Chief Complaint   Patient presents with    Neck Pain     Patient is here for a follow up.      he is a 70y.o. year old male who presents for follow up of injury. Follow Up Pain Assessment Encounter      Onset of Symptoms: Patient states he has had pain for months   ________________________________________________________________________  Description: Pain is now  has worsened      Pain Scale:(1-10): 5  Duration:  continuous  Radiation: low back, mid back   What makes it better?: exercise and heat  What makes it worse?:strecthing  Medications tried: acetaminophen  Modalities tried: PT        Reviewed and agree with Nurse Note and duplicated in this note. Reviewed PmHx, RxHx, FmHx, SocHx, AllgHx and updated and dated in the chart. No family history on file. No past medical history on file. Social History     Socioeconomic History    Marital status:    Tobacco Use    Smoking status: Current Some Day Smoker     Types: Cigars    Smokeless tobacco: Current User   Substance and Sexual Activity    Alcohol use:  Yes     Alcohol/week: 2.0 standard drinks     Types: 2 Glasses of wine per week     Comment: 2 at most    Drug use: No    Sexual activity: Yes        Review of Systems - negative except as listed above      Objective:     Vitals:    01/26/22 1115   BP: (!) 154/80   Pulse: 75   Resp: 16   Temp: 98 °F (36.7 °C)   SpO2: 99%   Weight: 168 lb (76.2 kg)   Height: 5' 9\" (1.753 m)       Physical Examination:  General appearance - alert, well appearing, and in no distress  Chest - clear to auscultation, no wheezes, rales or rhonchi, symmetric air entry  Heart - normal rate, regular rhythm, normal S1, S2, no murmurs, rubs, clicks or gallops  Abdomen - soft, nontender, nondistended, no masses or organomegaly  Neck -C-spine tenderness noted at C6, no pain to palpation of upper traps, negative Spurling bilaterally  Back exam - pain with motion noted during exam, tenderness noted left lower lumbar, positive straight-leg raise , normal reflexes and strength bilateral lower extremities, sensory exam intact bilateral lower extremities  Neurological - alert, oriented, normal speech, no focal findings or movement disorder noted  Musculoskeletal - no joint tenderness, deformity or swelling  Extremities - peripheral pulses normal, no pedal edema, no clubbing or cyanosis  Skin - normal coloration and turgor, no rashes, no suspicious skin lesions noted      Assessment/ Plan:   Diagnoses and all orders for this visit:    1. Neck pain  -     REFERRAL TO PHYSICAL THERAPY  -     MRI CERV SPINE WO CONT; Future  Patient continued with pain in neck despite physical therapy and conservative measures. Recommend MRI to further examine with discontinuation  2. Chronic midline low back pain without sciatica  -     REFERRAL TO PHYSICAL THERAPY  Patient will likely benefit from short course of physical therapy  Other orders  -     predniSONE (DELTASONE) 20 mg tablet; Take 20 mg by mouth three (3) times daily for 7 days. Pathophysiology, recovery and rehabilitation process discussed and questions answered   Counseling for 30 Minutes of the total visit duration   Pictures and figures used as necessary   Provided reassurance   Monitor response to Physical Therapy   Recommend activity modification   Recommend  lower impact activities-walking, Eliptical, Nordic Track, cycling or swimming               I have discussed the diagnosis with the patient and the intended plan as seen in the above orders. The patient has received an after-visit summary and questions were answered concerning future plans. Medication Side Effects and Warnings were discussed with patient,  Patient Labs were reviewed and or requested, and  Patient Past Records were reviewed and or requested  yes     Pt agrees to call or return to clinic and/or go to closest ER with any worsening of symptoms.   This may include, but not limited to increased fever (>100.4) with NSAIDS or Tylenol, increased edema, confusion, rash, worsening of presenting symptoms. Please note that this dictation was completed with [employer], the computer voice recognition software. Quite often unanticipated grammatical, syntax, homophones, and other interpretive errors are inadvertently transcribed by the computer software. Please disregard these errors. Please excuse any errors that have escaped final proofreading. Thank you.

## 2022-02-07 ENCOUNTER — HOSPITAL ENCOUNTER (OUTPATIENT)
Dept: PHYSICAL THERAPY | Age: 72
Discharge: HOME | End: 2022-02-07
Payer: MEDICARE

## 2022-02-07 PROCEDURE — 97162 PT EVAL MOD COMPLEX 30 MIN: CPT

## 2022-02-07 PROCEDURE — 97110 THERAPEUTIC EXERCISES: CPT

## 2022-02-07 NOTE — PROGRESS NOTES
[facility]   a part of [address] [place] [address]  [address] [address], [address] [place] [address], [address]  Phone: [phone]  Fax: [phone]      Plan of Care/Statement of Necessity for Physical Therapy Services  2-15    Patient name: Ramses Dawkins  : 1950  Provider#: [id number]  Referral source: Joseph Hahn MD      Medical/Treatment Diagnosis: Cervicalgia [M54.2]  Chronic midline low back pain without sciatica [M54.50, G89.29]     Prior Hospitalization: see medical history     Comorbidities: arthritis, hx of BPPV  Prior Level of Function: active daily (home workouts), lives independently  Medications: Verified on Patient Summary List  Start of Care: 22      Onset Date: 2022   The [address] [place] [address] and following information is based on the information from the initial evaluation. Assessment/ key information: Patient is a pleasant 70year old male who presents to skilled physical therapy with complaint of cervical pain and radicular tingling into his 4th and 5th digits primarily on the RUE and occasionally on the LUE that started to worsen 2-3 weeks ago. He reports inconsistent cervical symptoms for the past 40 years but this flair-up occurred after increasing working out. He does endorse improvement since taking a week off to relax. Currently demonstrates impairments with cervical AROM, UE strength, flexibility, cervical and scapular stability as well as joint mobility. Initiated an HEP and provided written documentation and patient verbalize understanding of all education provided. Recommended discontinued cervical sidebend stretches if any increase in radicular symptoms occurs. Patient states that he will have an MRI tomorrow. He will benefit from skilled PT to address his impairments and maximize outcomes.        Evaluation Complexity History MEDIUM  Complexity : 1-2 comorbidities / personal factors will impact the outcome/ POC ; Examination HIGH Complexity : 4+ Standardized tests and measures addressing body structure, function, activity limitation and / or participation in recreation  ;Presentation LOW Complexity : Stable, uncomplicated  ;Clinical Decision Making MEDIUM Complexity : FOTO score of 26-74  Overall Complexity Rating: LOW     Problem List: pain affecting function, decrease ROM, decrease strength, impaired gait/ balance, decrease ADL/ functional abilitiies, decrease activity tolerance, decrease flexibility/ joint mobility and decrease transfer abilities   Treatment Plan may include any combination of the following: Therapeutic exercise, Therapeutic activities, Neuromuscular re-education, Physical agent/modality, Gait/balance training, Manual therapy, Patient education, Self Care training, Functional mobility training and Home safety training  Patient / Family readiness to learn indicated by: asking questions, trying to perform skills and interest  Persons(s) to be included in education: patient (P)  Barriers to Learning/Limitations: None  Patient Goal (s): diminish/eliminate pain  Patient Self Reported Health Status: good  Rehabilitation Potential: good    Short and Long Term Goals: To be accomplished in 8-10 treatments:  1) Pt will be able to read without increase of neck or radicular pain  2) Pt will be able to look over shoulders while driving without increase of neck pain  3) Pt will be able to complete a home workout without report of increased radicular symptoms. 4) Pt will be able to sleep through the night without waking in pain  5) Pt will demonstrate improvement in FOTO score to >= 60/100 to indicate improvement in functional mobility. 6) Pt will be independent in progressive HEP. Frequency / Duration: Patient to be seen 1-2 times per week for 8-10 treatments.     Patient/ Caregiver education and instruction: self care, activity modification and exercises    [x]  Plan of care has been reviewed with PTA      Certification Period: 02/07/22-05/06/22  Katy Rider, PT 2/7/2022     ________________________________________________________________________    I certify that the above Therapy Services are being furnished while the patient is under my care. I agree with the treatment plan and certify that this therapy is necessary.     [de-identified] Signature:____________________  Date:____________Time: _________         Zenobia Abrams MD

## 2022-02-07 NOTE — PROGRESS NOTES
PT INITIAL EVALUATION NOTE - [facility] 2-15    Patient Name: Anupam Chong  [id number]  : 1950  [x]  Patient  Verified  Payor: [place] MEDICARE / Plan: [place] MEDICARE PART A & B / Product Type: Medicare /    In time: 414  Out time:   Total Treatment Time (min): 50  Total Timed Codes (min): 10  1:1 Treatment Time ( only): 10   Visit #: 1     Treatment Area: Cervicalgia [M54.2]  Chronic midline low back pain without sciatica [M54.50, G89.29]    SUBJECTIVE  Pain Level (0-10 scale): 2/10  Any medication changes, allergies to medications, adverse drug reactions, diagnosis change, or new procedure performed?: [] No    [x] Yes (see summary sheet for update)  Subjective:    I went to [facility] and just rested and that really seemed to help. PLOF: active daily (home workouts)  Mechanism of Injury: Patient reports that he has been returning to exercise over the past month and this includes a \"perfect pull-up bar\" which he had been using once to twice weekly. About two weeks ago-his cervical pain increased and he had increased tingling in BUE (R > L) as well as sharp bursts of pain into his R posterior shoulder. He stopped working out for a week at [facility] and reported an improvement in his symptoms. Previous Treatment/Compliance: Very compliant with previous POC  PMHx/Surgical Hx: arthritis, hx of radicular pain into BUE, inconsistent   Work Hx: Retired  Living Situation: 2-story house, lives alone  Pt Goals: \"diminish/eliminate pain\"  Barriers: chronicity   Motivation: high   Substance use: -  Cognition: A & O x 4        OBJECTIVE/EXAMINATION  Posture:  Elevated shoulders in sitting, stiff posture   Other Observations:  Trunk rotation coupling with cervical rotation R > L  Gait and Functional Mobility:  No significant impairments noted   Palpation: TTP noted B scalenes (R > L), R mastoid process        Cervical AROM:        R  L    Flexion    42 deg, p!       Extension   29 deg      Side Bending   28, grinding 36    Rotation   61  68            UPPER QUARTER   MUSCLE STRENGTH  KEY       R  L  0 - No Contraction    1 - Trace     2 - Poor   C4 Sh Elev  4+  4+  3 - Fair    C5 Deltoid/Biceps 4+  4+  4 - Good   C6 Wrist Ext  4+  4+  5 - Normal   C7 Triceps  5  5      C8 Thumb Ext  5  5      T1 Hand Inst  5  5    Flexibility: Increased tension and turgor noted over B UT/LS, B scalenes, R SCM, B pec major    Mobility Assessment: hypomobility noted with R aria C3-C7      MMT:        R L  Shoulder Flex    4 4+  Shoulder Abd    4 4+    Neurological: Reflexes / Sensations: Intact to light touch bilaterally on UE  Special Tests: Cervical Distraction: (+)  Cervical Compression: (+)    Alar Odontoid Integrity Test: -    Transverse Ligament Test: -      Modality rationale: decrease pain and increase tissue extensibility to improve the patients ability to return to workout   Min Type Additional Details    [] Estim: []Att   []Unatt        []TENS instruct                  []IFC  []Premod   []NMES                     []Other:  []w/US   []w/ice   []w/heat  Position:  Location:    []  Traction: [] Cervical       []Lumbar                       [] Prone          []Supine                       []Intermittent   []Continuous Lbs:  [] before manual  [] after manual  []w/heat    []  Ultrasound: []Continuous   [] Pulsed at:                           []1MHz   []3MHz Location:  W/cm2:    [] Paraffin         Location:   []w/heat   10 []  Ice     [x]  Heat  []  Ice massage Position: seated  Location: R UT/cervical spine    []  Laser  []  Other: Position:  Location:      []  Vasopneumatic Device Pressure:       [] lo [] med [] hi   Temperature:      [x] Skin assessment post-treatment:  [x]intact []redness- no adverse reaction    []redness  adverse reaction:     10 min Therapeutic Exercise:  [x] See flow sheet :   Rationale: increase ROM and increase strength to improve the patients ability to return to working out         With   [] TE [] TA   [] Neuro   [] SC   [] other: Patient Education: [x] Review HEP    [] Progressed/Changed HEP based on:   [] positioning   [] body mechanics   [] transfers   [] heat/ice application    [] other:      Other Objective/Functional Measures:   FOTO Functional Measure: 46/100                  Pain Level (0-10 scale) post treatment: 1/10    ASSESSMENT/Changes in Function:     [x]  See Plan of [id number] Reading Jas, PT 2/7/2022

## 2022-02-08 ENCOUNTER — HOSPITAL ENCOUNTER (OUTPATIENT)
Dept: MRI IMAGING | Age: 72
Discharge: HOME | End: 2022-02-08
Attending: FAMILY MEDICINE
Payer: MEDICARE

## 2022-02-08 DIAGNOSIS — M54.2 NECK PAIN: ICD-10-CM

## 2022-02-08 PROCEDURE — 72141 MRI NECK SPINE W/O DYE: CPT

## 2022-02-08 NOTE — PROGRESS NOTES
Mild to moderate changes of arthritis in your neck with mild nerve pinching.   Recommend continued physical therapy at this time

## 2022-02-09 ENCOUNTER — HOSPITAL ENCOUNTER (OUTPATIENT)
Dept: PHYSICAL THERAPY | Age: 72
Discharge: HOME | End: 2022-02-09
Payer: MEDICARE

## 2022-02-09 PROCEDURE — 97140 MANUAL THERAPY 1/> REGIONS: CPT | Performed by: PHYSICAL THERAPIST

## 2022-02-09 PROCEDURE — 97110 THERAPEUTIC EXERCISES: CPT | Performed by: PHYSICAL THERAPIST

## 2022-02-09 NOTE — PROGRESS NOTES
PT DAILY TREATMENT NOTE - [facility] -15    Patient Name: Tayo Devries.  Date:2022  : 1950  [x]  Patient  Verified  Payor: [place] MEDICARE / Plan: VA MEDICARE PART A & B / Product Type: Medicare /    In time: 7132A  Out time:  Total Treatment Time (min): 52  Total Timed Codes (min): 52  1:1 Treatment Time ( only): 45   Visit #:  2    Treatment Area: Cervicalgia [M54.2]  Chronic midline low back pain without sciatica [M54.50, G89.29]    SUBJECTIVE  Pain Level (0-10 scale): 2/10  Any medication changes, allergies to medications, adverse drug reactions, diagnosis change, or new procedure performed?: [x] No    [] Yes (see summary sheet for update)  Subjective functional status/changes:   [] No changes reported  Numbness in the 4th and 5th digit on the left hand. Chin tuck causes some \"crunching\".     OBJECTIVE        24 min Therapeutic Exercise:  [x] See flow sheet :   Rationale: increase ROM, increase strength, improve coordination, improve balance and increase proprioception to improve the patients ability to maintain cervical stabilization with activity    28 min Manual Therapy: STM bilateral cervical paraspinal muscles and sub-occipital release, manual cervical distraction 4 progressively advancing degrees of cervical flexion assessing for best effect (approx 15-25 degrees of cervical flexion best effect); upslide grade 3-4 C4/5-C6/7 with contralateral rotation movement    Rationale: decrease pain, increase ROM and increase tissue extensibility to improve the patients ability to maintain cervical stabilization with activity      With   [x] TE   [] TA   [] Neuro   [] SC   [] other: Patient Education: [x] Review HEP    [] Progressed/Changed HEP based on:   [] positioning   [] body mechanics   [] transfers   [] heat/ice application    [] other:      Other Objective/Functional Measures: --     Pain Level (0-10 scale) post treatment: 1/10    ASSESSMENT/Changes in Function:   Pt reported reduction in left 4th-5th digit numbness/tingling as well as bilateral feet tingling following manual distraction. Adjusted chin tuck to forward nodding in supine with ability to complete without grinding sensation. Plan to progress supine forward nodding to seated and incorporate with postural reeducation training. Consider mechanical traction for cervical spine if he continues to respond favorably to manual distraction. Patient will continue to benefit from skilled PT services to modify and progress therapeutic interventions, address functional mobility deficits, address ROM deficits, address strength deficits, analyze and address soft tissue restrictions, analyze and cue movement patterns, analyze and modify body mechanics/ergonomics and assess and modify postural abnormalities to attain remaining goals. []  See Plan of Care  []  See progress note/recertification  []  See Discharge Summary         Progress towards goals / Updated goals:  Short and Long Term Goals: To be accomplished in 8-10 treatments:  1) Pt will be able to read without increase of neck or radicular pain  2) Pt will be able to look over shoulders while driving without increase of neck pain  3) Pt will be able to complete a home workout without report of increased radicular symptoms. 4) Pt will be able to sleep through the night without waking in pain  5) Pt will demonstrate improvement in FOTO score to >= 60/100 to indicate improvement in functional mobility. 6) Pt will be independent in progressive HEP.  Progressing        PLAN  [x]  Upgrade activities as tolerated     [x]  Continue plan of care  []  Update interventions per flow sheet       []  Discharge due to:_  []  Other:_      Chinyere Phillips PT, DPT 2/9/2022

## 2022-02-16 ENCOUNTER — HOSPITAL ENCOUNTER (OUTPATIENT)
Dept: PHYSICAL THERAPY | Age: 72
Discharge: HOME | End: 2022-02-16
Payer: MEDICARE

## 2022-02-16 PROCEDURE — 97110 THERAPEUTIC EXERCISES: CPT

## 2022-02-16 PROCEDURE — 97140 MANUAL THERAPY 1/> REGIONS: CPT

## 2022-02-16 PROCEDURE — 97112 NEUROMUSCULAR REEDUCATION: CPT

## 2022-02-16 NOTE — PROGRESS NOTES
PT DAILY TREATMENT NOTE - [facility] 2-15    Patient Name: Magdalena Ramos.  Date:2022  : 1950  [x]  Patient  Verified  Payor: Adam Lopez / Plan: [place] MEDICARE PART A & B / Product Type: Medicare /    In time: 7331  Out time: 1146  Total Treatment Time (min): 61  Total Timed Codes (min): 51  1:1 Treatment Time ( only): 48   Visit #:  3    Treatment Area: Cervicalgia [M54.2]  Chronic midline low back pain without sciatica [M54.50, G89.29]    SUBJECTIVE  Pain Level (0-10 scale): 4/10 neck and back   Any medication changes, allergies to medications, adverse drug reactions, diagnosis change, or new procedure performed?: [x] No    [] Yes (see summary sheet for update)  Subjective functional status/changes:   [] No changes reported  I was feeling good on Monday so I carried all my groceries in one arm across several yards and parked further away and I think that just made everything hurt more. I feel achy in all my joints not just my neck. But I still dont have any numbness or tingling in my arms.      OBJECTIVE    Modality rationale: decrease pain and increase tissue extensibility to improve the patients ability to perform household chores with decreased pain   Min Type Additional Details       [] Estim: []Att   []Unatt    []TENS instruct                  []IFC  []Premod   []NMES                     []Other:  []w/US   []w/ice   []w/heat  Position:  Location:       []  Traction: [] Cervical       []Lumbar                       [] Prone          []Supine                       []Intermittent   []Continuous Lbs:  [] before manual  [] after manual  []w/heat    []  Ultrasound: []Continuous   [] Pulsed                       at: []1MHz   []3MHz Location:  W/cm2:    [] Paraffin         Location:   []w/heat   10 []  Ice     [x]  Heat  []  Ice massage Position: seated  Location: cervical    []  Laser  []  Other: Position:  Location:      []  Vasopneumatic Device Pressure:       [] lo [] med [] hi   Temperature:      [x] Skin assessment post-treatment:  [x]intact []redness- no adverse reaction    []redness  adverse reaction:     20 min Therapeutic Exercise:  [x] See flow sheet :   Rationale: increase ROM and increase strength to improve the patients ability to return to sport    12 min Neuromuscular Re-education:  [x]  See flow sheet : serratus punches, wall slides, scap squeezes    Rationale: increase ROM, increase strength, improve coordination and increase proprioception  to improve the patients ability to perform home exercises without pain    19 min Manual Therapy: STM bilateral cervical paraspinal muscles and sub-occipital release, manual cervical distraction with progressively advancing degrees of cervical flexion    Rationale: decrease pain, increase ROM, increase tissue extensibility and decrease trigger points to improve the patients ability to perform household chores without increased pain         With   [] TE   [] TA   [] Neuro   [] SC   [] other: Patient Education: [x] Review HEP    [] Progressed/Changed HEP based on:   [] positioning   [] body mechanics   [] transfers   [] heat/ice application    [] other:      Other Objective/Functional Measures:      Pain Level (0-10 scale) post treatment: 0/10 neck, 3/10 back    ASSESSMENT/Changes in Function:   Patient endorses continued improvement in his tingling and numbness. Focused on scapular stabilization and gentle manual therapy with good response. Continues to respond best to cervical traction at 15-25 deg of cervical flexion. Patient will continue to benefit from skilled PT services to modify and progress therapeutic interventions, address functional mobility deficits, address ROM deficits, address strength deficits, analyze and address soft tissue restrictions, analyze and cue movement patterns, analyze and modify body mechanics/ergonomics and assess and modify postural abnormalities to attain remaining goals.      [x]  See Plan of Care  []  See progress note/recertification  []  See Discharge Summary         Progress towards goals / Updated goals:  Short and Long Term Goals: To be accomplished in 8-10 treatments:  1) Pt will be able to read without increase of neck or radicular pain  2) Pt will be able to look over shoulders while driving without increase of neck pain  3) Pt will be able to complete a home workout without report of increased radicular symptoms.    4) Pt will be able to sleep through the night without waking in pain  5) Pt will demonstrate improvement in FOTO score to >= 60/100 to indicate improvement in functional mobility.    6) Pt will be independent in progressive HEP. Progressing        PLAN  [x]  Upgrade activities as tolerated     [x]  Continue plan of care  [x]  Update interventions per flow sheet       []  Discharge due to:_  []  Other:_      Pablo Acevedo, PT 2/16/2022

## 2022-02-21 ENCOUNTER — HOSPITAL ENCOUNTER (OUTPATIENT)
Dept: PHYSICAL THERAPY | Age: 72
Discharge: HOME | End: 2022-02-21
Payer: MEDICARE

## 2022-02-21 PROCEDURE — 97140 MANUAL THERAPY 1/> REGIONS: CPT | Performed by: PHYSICAL THERAPIST

## 2022-02-21 PROCEDURE — 97110 THERAPEUTIC EXERCISES: CPT | Performed by: PHYSICAL THERAPIST

## 2022-02-21 PROCEDURE — 97112 NEUROMUSCULAR REEDUCATION: CPT | Performed by: PHYSICAL THERAPIST

## 2022-02-21 NOTE — PROGRESS NOTES
PT DAILY TREATMENT NOTE - [facility] 2-15    Patient Name: Deann Bassett.  Date:2022  : 1950  [x]  Patient  Verified  Payor: Sharmaine Hawthorne / Plan: [place] MEDICARE PART A & B / Product Type: Medicare /    In time: 4349X Out time: 150p  Total Treatment Time (min): 63  Total Timed Codes (min): 53  1:1 Treatment Time ( only): 48  Visit #:  4    Treatment Area: Cervicalgia [M54.2]  Chronic midline low back pain without sciatica [M54.50, G89.29]    SUBJECTIVE  Pain Level (0-10 scale): 4/10 back   /10 Neck   Any medication changes, allergies to medications, adverse drug reactions, diagnosis change, or new procedure performed?: [x] No    [] Yes (see summary sheet for update)  Subjective functional status/changes:   [] No changes reported  {Pt reports that his back is about the same and his neck is doing better. Very faint numbness in the left 3rd/4th digits.     OBJECTIVE    Modality rationale: decrease pain and increase tissue extensibility to improve the patients ability to perform household chores with decreased pain   Min Type Additional Details       [] Estim: []Att   []Unatt    []TENS instruct                  []IFC  []Premod   []NMES                     []Other:  []w/US   []w/ice   []w/heat  Position:  Location:       []  Traction: [] Cervical       []Lumbar                       [] Prone          []Supine                       []Intermittent   []Continuous Lbs:  [] before manual  [] after manual  []w/heat    []  Ultrasound: []Continuous   [] Pulsed                       at: []1MHz   []3MHz Location:  W/cm2:    [] Paraffin         Location:   []w/heat   10 []  Ice     [x]  Heat  []  Ice massage Position: seated  Location: cervical    []  Laser  []  Other: Position:  Location:      []  Vasopneumatic Device Pressure:       [] lo [] med [] hi   Temperature:      [x] Skin assessment post-treatment:  [x]intact []redness- no adverse reaction    []redness  adverse reaction:     22 min Therapeutic Exercise:  [x] See flow sheet :   Rationale: increase ROM and increase strength to improve the patients ability to return to sport    12 min Neuromuscular Re-education:  [x]  See flow sheet : serratus punches, wall slides, scap squeezes    Rationale: increase ROM, increase strength, improve coordination and increase proprioception  to improve the patients ability to perform home exercises without pain    19 min Manual Therapy: , manual cervical distraction with 15-20 degrees of cervical flexion as well as with right sidebend during traction, upslide right and left mid-cervical with rotation    Rationale: decrease pain, increase ROM, increase tissue extensibility and decrease trigger points to improve the patients ability to perform household chores without increased pain         With   [] TE   [] TA   [] Neuro   [] SC   [] other: Patient Education: [x] Review HEP    [] Progressed/Changed HEP based on:   [] positioning   [] body mechanics   [] transfers   [] heat/ice application    [] other:      Other Objective/Functional Measures:      Pain Level (0-10 scale) post treatment: 0/10 neck, 3/10 back    ASSESSMENT/Changes in Function:   Continue to advance with active movement with maximal postural awareness during movement. Continues to respond best to cervical traction at 15-25 deg of cervical flexion. Patient will continue to benefit from skilled PT services to modify and progress therapeutic interventions, address functional mobility deficits, address ROM deficits, address strength deficits, analyze and address soft tissue restrictions, analyze and cue movement patterns, analyze and modify body mechanics/ergonomics and assess and modify postural abnormalities to attain remaining goals.      [x]  See Plan of Care  []  See progress note/recertification  []  See Discharge Summary         Progress towards goals / Updated goals:  Short and Long Term Goals: To be accomplished in 8-10 treatments:  1) Pt will be able to read without increase of neck or radicular pain  2) Pt will be able to look over shoulders while driving without increase of neck pain  3) Pt will be able to complete a home workout without report of increased radicular symptoms.    4) Pt will be able to sleep through the night without waking in pain  5) Pt will demonstrate improvement in FOTO score to >= 60/100 to indicate improvement in functional mobility.    6) Pt will be independent in progressive HEP. Progressing        PLAN  [x]  Upgrade activities as tolerated     [x]  Continue plan of care  [x]  Update interventions per flow sheet       []  Discharge due to:_  []  Other:_      Phoebe Gil, PT, DPT 2/21/2022

## 2022-02-23 ENCOUNTER — HOSPITAL ENCOUNTER (OUTPATIENT)
Dept: PHYSICAL THERAPY | Age: 72
Discharge: HOME | End: 2022-02-23
Payer: MEDICARE

## 2022-02-23 PROCEDURE — 97140 MANUAL THERAPY 1/> REGIONS: CPT | Performed by: PHYSICAL THERAPIST

## 2022-02-23 PROCEDURE — 97110 THERAPEUTIC EXERCISES: CPT | Performed by: PHYSICAL THERAPIST

## 2022-02-23 PROCEDURE — 97012 MECHANICAL TRACTION THERAPY: CPT | Performed by: PHYSICAL THERAPIST

## 2022-02-23 NOTE — PROGRESS NOTES
PT DAILY TREATMENT NOTE - [facility] 2-15    Patient Name: Faviola Herbert.  Date:2022  : 1950  [x]  Patient  Verified  Payor: [place] MED[place]ARE / Plan: [place] MEDICARE PART A & B / Product Type: Medicare /    In time: 3876G Out time: 111  Total Treatment Time (min): 53  Total Timed Codes (min): 41  1:1 Treatment Time ( only): 38  Visit #:  5    Treatment Area: Cervicalgia [M54.2]  Chronic midline low back pain without sciatica [M54.50, G89.29]    SUBJECTIVE  Pain Level (0-10 scale):    2/10 Neck   Any medication changes, allergies to medications, adverse drug reactions, diagnosis change, or new procedure performed?: [x] No    [] Yes (see summary sheet for update)  Subjective functional status/changes:   [] No changes reported  Pt reports that he had 2 episodes waking with numbness in hands last night but is better today.     OBJECTIVE    Modality rationale: decrease pain and increase tissue extensibility to improve the patients ability to perform household chores with decreased pain   Min Type Additional Details       [] Estim: []Att   []Unatt    []TENS instruct                  []IFC  []Premod   []NMES                     []Other:  []w/US   []w/ice   []w/heat  Position:  Location:    12   [x]  Traction: [x] Cervical       []Lumbar                       [] Prone          [x]Supine                       []Intermittent   [x]Continuous Lbs: 21  [] before manual  [x] after manual  []w/heat    []  Ultrasound: []Continuous   [] Pulsed                       at: []1MHz   []3MHz Location:  W/cm2:    [] Paraffin         Location:   []w/heat    []  Ice     []  Heat  []  Ice massage Position:   Location:     []  Laser  []  Other: Position:  Location:      []  Vasopneumatic Device Pressure:       [] lo [] med [] hi   Temperature:      [x] Skin assessment post-treatment:  [x]intact []redness- no adverse reaction    []redness  adverse reaction:     21 min Therapeutic Exercise:  [x] See flow sheet :   Rationale: increase ROM and increase strength to improve the patients ability to return to sport      20 min Manual Therapy: , manual cervical distraction with 15-20 degrees of cervical flexion as well as with right sidebend during traction, upslide right and left mid-cervical with rotation    Rationale: decrease pain, increase ROM, increase tissue extensibility and decrease trigger points to improve the patients ability to perform household chores without increased pain         With   [] TE   [] TA   [] Neuro   [] SC   [] other: Patient Education: [x] Review HEP    [] Progressed/Changed HEP based on:   [] positioning   [] body mechanics   [] transfers   [] heat/ice application    [] other:      Other Objective/Functional Measures:  Cervical AROM Rotation R 65 -->70, L 55 --> 65    Pain Level (0-10 scale) post treatment: 0/10 neck,     ASSESSMENT/Changes in Function:   Advanced with mechanical distraction today with resolution of tingling during. Will look into getting him a home traction unit for continued management. Improved cervical Rotation today. Patient will continue to benefit from skilled PT services to modify and progress therapeutic interventions, address functional mobility deficits, address ROM deficits, address strength deficits, analyze and address soft tissue restrictions, analyze and cue movement patterns, analyze and modify body mechanics/ergonomics and assess and modify postural abnormalities to attain remaining goals. []  See Plan of Care  []  See progress note/recertification  []  See Discharge Summary         Progress towards goals / Updated goals:  Short and Long Term Goals: To be accomplished in 8-10 treatments:  1) Pt will be able to read without increase of neck or radicular pain  2) Pt will be able to look over shoulders while driving without increase of neck pain  3) Pt will be able to complete a home workout without report of increased radicular symptoms.    4) Pt will be able to sleep through the night without waking in pain  5) Pt will demonstrate improvement in FOTO score to >= 60/100 to indicate improvement in functional mobility.    6) Pt will be independent in progressive HEP. Progressing        PLAN  [x]  Upgrade activities as tolerated     [x]  Continue plan of care  [x]  Update interventions per flow sheet       []  Discharge due to:_  []  Other:_      Gary Perez, PT, DPT 2/23/2022

## 2022-03-01 ENCOUNTER — HOSPITAL ENCOUNTER (OUTPATIENT)
Dept: PHYSICAL THERAPY | Age: 72
Discharge: HOME | End: 2022-03-01
Payer: MEDICARE

## 2022-03-01 PROCEDURE — 97110 THERAPEUTIC EXERCISES: CPT | Performed by: PHYSICAL THERAPIST

## 2022-03-01 PROCEDURE — 97012 MECHANICAL TRACTION THERAPY: CPT | Performed by: PHYSICAL THERAPIST

## 2022-03-01 PROCEDURE — 97140 MANUAL THERAPY 1/> REGIONS: CPT | Performed by: PHYSICAL THERAPIST

## 2022-03-01 NOTE — PROGRESS NOTES
PT DAILY TREATMENT NOTE - [facility] 2-15    Patient Name: Francisca Matthews  DXVB:  : 1950  [x]  Patient  Verified  Payor: [place] MEDICARE / Plan: [place] MEDICARE PART A & B / Product Type: Medicare /    In time: 1000a Out time: 1110a  Total Treatment Time (min): 70  Total Timed Codes (min): 43  1:1 Treatment Time ( only): 30  Visit #:  6    Treatment Area: Cervicalgia [M54.2]  Chronic midline low back pain without sciatica [M54.50, G89.29]    SUBJECTIVE  Pain Level (0-10 scale):    2/10 Neck   Any medication changes, allergies to medications, adverse drug reactions, diagnosis change, or new procedure performed?: [x] No    [] Yes (see summary sheet for update)  Subjective functional status/changes:   [] No changes reported  Pt reports that he felt less tingling after his last session. Today just some neck soreness at end range rotation.     OBJECTIVE    Modality rationale: decrease pain and increase tissue extensibility to improve the patients ability to perform household chores with decreased pain   Min Type Additional Details       [] Estim: []Att   []Unatt    []TENS instruct                  []IFC  []Premod   []NMES                     []Other:  []w/US   []w/ice   []w/heat  Position:  Location:    17   [x]  Traction: [x] Cervical       []Lumbar                       [] Prone          [x]Supine                       []Intermittent   [x]Continuous Lbs: 22  [] before manual  [x] after manual  []w/heat    []  Ultrasound: []Continuous   [] Pulsed                       at: []1MHz   []3MHz Location:  W/cm2:    [] Paraffin         Location:   []w/heat    []  Ice     []  Heat  []  Ice massage Position:   Location:     []  Laser  []  Other: Position:  Location:      []  Vasopneumatic Device Pressure:       [] lo [] med [] hi   Temperature:      [x] Skin assessment post-treatment:  [x]intact []redness- no adverse reaction    []redness - adverse reaction:     30 min Therapeutic Exercise:  [x] See flow sheet : Rationale: increase ROM and increase strength to improve the patients ability to return to sport      13 min Manual Therapy: , upslide right and left mid-cervical with rotation    Rationale: decrease pain, increase ROM, increase tissue extensibility and decrease trigger points to improve the patients ability to perform household chores without increased pain         With   [] TE   [] TA   [] Neuro   [] SC   [] other: Patient Education: [x] Review HEP    [] Progressed/Changed HEP based on:   [] positioning   [] body mechanics   [] transfers   [] heat/ice application    [] other:      Other Objective/Functional Measures:  Cervical AROM Rotation R 65, L  65    Pain Level (0-10 scale) post treatment: 0/10 neck,     ASSESSMENT/Changes in Function:   Continues to do well with mechanical traction for reducing bilateral numbness/tingling in fingers. Plan to get home traction unit for home use. Patient will continue to benefit from skilled PT services to modify and progress therapeutic interventions, address functional mobility deficits, address ROM deficits, address strength deficits, analyze and address soft tissue restrictions, analyze and cue movement patterns, analyze and modify body mechanics/ergonomics and assess and modify postural abnormalities to attain remaining goals. []  See Plan of Care  []  See progress note/recertification  []  See Discharge Summary         Progress towards goals / Updated goals:  Short and Long Term Goals: To be accomplished in 8-10 treatments:  1) Pt will be able to read without increase of neck or radicular pain Progresing  2) Pt will be able to look over shoulders while driving without increase of neck pain MET  3) Pt will be able to complete a home workout without report of increased radicular symptoms.  MET  4) Pt will be able to sleep through the night without waking in pain Progresing  5) Pt will demonstrate improvement in FOTO score to >= 60/100 to indicate improvement in functional mobility.    6) Pt will be independent in progressive HEP. Progressing        PLAN  [x]  Upgrade activities as tolerated     [x]  Continue plan of care  [x]  Update interventions per flow sheet       []  Discharge due to:_  []  Other:_      Froilan Diego, PT, DPT 3/1/2022

## 2022-03-04 ENCOUNTER — HOSPITAL ENCOUNTER (OUTPATIENT)
Dept: PHYSICAL THERAPY | Age: 72
Discharge: HOME | End: 2022-03-04
Payer: MEDICARE

## 2022-03-04 PROCEDURE — 97012 MECHANICAL TRACTION THERAPY: CPT | Performed by: PHYSICAL THERAPIST

## 2022-03-04 PROCEDURE — 97140 MANUAL THERAPY 1/> REGIONS: CPT | Performed by: PHYSICAL THERAPIST

## 2022-03-04 NOTE — PROGRESS NOTES
PT DAILY TREATMENT NOTE - [facility] 2-15    Patient Name: Gwen Dejesus.  Date:3/4/2022  : 1950  [x]  Patient  Verified  Payor: [place] MEDICARE / Plan: [place] MEDICARE PART A & B / Product Type: Medicare /    In time: 8727G Out time: 1130a  Total Treatment Time (min): 40  Total Timed Codes (min): 23  1:1 Treatment Time ( only): 15  Visit #:  7    Treatment Area: Cervicalgia [M54.2]  Chronic midline low back pain without sciatica [M54.50, G89.29]    SUBJECTIVE  Pain Level (0-10 scale):    0/10 Neck   Any medication changes, allergies to medications, adverse drug reactions, diagnosis change, or new procedure performed?: [x] No    [] Yes (see summary sheet for update)  Subjective functional status/changes:   [] No changes reported  Pt reports that he notices relief from the traction lasting approx 2-3 days before he notices numbness/tingling starting to increase in his fingers again.     OBJECTIVE    Modality rationale: decrease pain and increase tissue extensibility to improve the patients ability to perform household chores with decreased pain   Min Type Additional Details       [] Estim: []Att   []Unatt    []TENS instruct                  []IFC  []Premod   []NMES                     []Other:  []w/US   []w/ice   []w/heat  Position:  Location:    17   [x]  Traction: [x] Cervical       []Lumbar                       [] Prone          [x]Supine                       []Intermittent   [x]Continuous Lbs: 22  [] before manual  [x] after manual  []w/heat    []  Ultrasound: []Continuous   [] Pulsed                       at: []1MHz   []3MHz Location:  W/cm2:    [] Paraffin         Location:   []w/heat    []  Ice     []  Heat  []  Ice massage Position:   Location:     []  Laser  []  Other: Position:  Location:      []  Vasopneumatic Device Pressure:       [] lo [] med [] hi   Temperature:      [x] Skin assessment post-treatment:  [x]intact []redness- no adverse reaction    []redness  adverse reaction:     8 min Therapeutic Exercise:  [x] See flow sheet :   Rationale: increase ROM and increase strength to improve the patients ability to return to sport      15 min Manual Therapy: , upslide right and left mid-cervical with rotation    Rationale: decrease pain, increase ROM, increase tissue extensibility and decrease trigger points to improve the patients ability to perform household chores without increased pain         With   [] TE   [] TA   [] Neuro   [] SC   [] other: Patient Education: [x] Review HEP    [] Progressed/Changed HEP based on:   [] positioning   [] body mechanics   [] transfers   [] heat/ice application    [] other:      Other Objective/Functional Measures:  Cervical AROM Rotation R 72, L  72    Pain Level (0-10 scale) post treatment: 0/10 neck,     ASSESSMENT/Changes in Function:   Continues to do well with mechanical traction for reducing bilateral numbness/tingling in fingers. Cervical rotation seems to be holding steady. Plan to get Cervical traction for home use and discharge after he has demonstrated independence in how to set-up and use the device. Patient will continue to benefit from skilled PT services to modify and progress therapeutic interventions, address functional mobility deficits, address ROM deficits, address strength deficits, analyze and address soft tissue restrictions, analyze and cue movement patterns, analyze and modify body mechanics/ergonomics and assess and modify postural abnormalities to attain remaining goals. []  See Plan of Care  []  See progress note/recertification  []  See Discharge Summary         Progress towards goals / Updated goals:  Short and Long Term Goals: To be accomplished in 8-10 treatments:  1) Pt will be able to read without increase of neck or radicular pain Progresing  2) Pt will be able to look over shoulders while driving without increase of neck pain MET  3) Pt will be able to complete a home workout without report of increased radicular symptoms.  MET  4) Pt will be able to sleep through the night without waking in pain Progresing  5) Pt will demonstrate improvement in FOTO score to >= 60/100 to indicate improvement in functional mobility.    6) Pt will be independent in progressive HEP. Progressing        PLAN  [x]  Upgrade activities as tolerated     [x]  Continue plan of care  [x]  Update interventions per flow sheet       []  Discharge due to:_  []  Other:_      Gary Perez, PT, DPT 3/4/2022

## 2022-03-11 ENCOUNTER — OFFICE VISIT (OUTPATIENT)
Dept: INTERNAL MEDICINE CLINIC | Age: 72
End: 2022-03-11
Payer: MEDICARE

## 2022-03-11 VITALS
RESPIRATION RATE: 16 BRPM | DIASTOLIC BLOOD PRESSURE: 84 MMHG | SYSTOLIC BLOOD PRESSURE: 139 MMHG | HEART RATE: 72 BPM | WEIGHT: 171 LBS | OXYGEN SATURATION: 99 % | TEMPERATURE: 98.3 F | BODY MASS INDEX: 25.33 KG/M2 | HEIGHT: 69 IN

## 2022-03-11 DIAGNOSIS — R20.0 NUMBNESS OF RIGHT FOOT: ICD-10-CM

## 2022-03-11 DIAGNOSIS — G89.29 CHRONIC MIDLINE LOW BACK PAIN WITHOUT SCIATICA: Primary | ICD-10-CM

## 2022-03-11 DIAGNOSIS — M54.50 ACUTE BILATERAL LOW BACK PAIN WITHOUT SCIATICA: ICD-10-CM

## 2022-03-11 DIAGNOSIS — M54.50 CHRONIC MIDLINE LOW BACK PAIN WITHOUT SCIATICA: Primary | ICD-10-CM

## 2022-03-11 PROCEDURE — 3017F COLORECTAL CA SCREEN DOC REV: CPT | Performed by: FAMILY MEDICINE

## 2022-03-11 PROCEDURE — G8427 DOCREV CUR MEDS BY ELIG CLIN: HCPCS | Performed by: FAMILY MEDICINE

## 2022-03-11 PROCEDURE — G8536 NO DOC ELDER MAL SCRN: HCPCS | Performed by: FAMILY MEDICINE

## 2022-03-11 PROCEDURE — 1101F PT FALLS ASSESS-DOCD LE1/YR: CPT | Performed by: FAMILY MEDICINE

## 2022-03-11 PROCEDURE — G8510 SCR DEP NEG, NO PLAN REQD: HCPCS | Performed by: FAMILY MEDICINE

## 2022-03-11 PROCEDURE — 99214 OFFICE O/P EST MOD 30 MIN: CPT | Performed by: FAMILY MEDICINE

## 2022-03-11 PROCEDURE — G8419 CALC BMI OUT NRM PARAM NOF/U: HCPCS | Performed by: FAMILY MEDICINE

## 2022-03-11 NOTE — PROGRESS NOTES
Chief Complaint   Patient presents with    Numbness     Patient is here for numbness in both feet. he is a 70y.o. year old male who presents for evaluation of numbness in feet and fingers  Pain Assessment Encounter      Doe Kwok Sr.  3/11/2022  Onset of Symptoms: Patient states he has had issues for months now  ________________________________________________________________________  Description:Patient states he has lower back pain and thinks this is the cause of the numbness in his feet     Frequency: more than 5 times a day  Pain Scale:(1-10): 4  Trauma Hx: none  Hx of similar symptoms: Yes  Radiation: YES, leg and low back  Duration:  continuous      Progression: has worsened  What makes it better?: none  What makes it worse?:sitting  Medications tried: ibuprofen    Reviewed and agree with Nurse Note and duplicated in this note. Reviewed PmHx, RxHx, FmHx, SocHx, AllgHx and updated and dated in the chart. No family history on file. No past medical history on file. Social History     Socioeconomic History    Marital status:    Tobacco Use    Smoking status: Current Some Day Smoker     Types: Cigars    Smokeless tobacco: Current User   Substance and Sexual Activity    Alcohol use:  Yes     Alcohol/week: 2.0 standard drinks     Types: 2 Glasses of wine per week     Comment: 2 at most    Drug use: No    Sexual activity: Yes        Review of Systems - negative except as listed above      Objective:     Vitals:    03/11/22 1010   BP: 139/84   Pulse: 72   Resp: 16   Temp: 98.3 °F (36.8 °C)   SpO2: 99%   Weight: 171 lb (77.6 kg)   Height: 5' 9\" (1.753 m)       Physical Examination:  General appearance - alert, well appearing, and in no distress  Chest - clear to auscultation, no wheezes, rales or rhonchi, symmetric air entry  Heart - normal rate, regular rhythm, normal S1, S2, no murmurs, rubs, clicks or gallops  Abdomen - soft, nontender, nondistended, no masses or organomegaly  Neck -C-spine tenderness noted at C6, no pain to palpation of upper traps, negative Spurling bilaterally  Back exam - pain with motion noted during exam, tenderness noted left lower lumbar, positive straight-leg raise , normal reflexes and strength bilateral lower extremities, sensory exam intact bilateral lower extremities  Neurological - alert, oriented, normal speech, no focal findings or movement disorder noted  Musculoskeletal - no joint tenderness, deformity or swelling  Extremities - peripheral pulses normal, no pedal edema, no clubbing or cyanosis  Skin - normal coloration and turgor, no rashes, no suspicious skin lesions noted         Assessment/ Plan:   Diagnoses and all orders for this visit:    1. Chronic midline low back pain without sciatica  -     REFERRAL TO ORTHOPEDICS  -     MRI LUMB SPINE WO CONT; Future    2. Acute bilateral low back pain without sciatica  -     REFERRAL TO ORTHOPEDICS  -     MRI LUMB SPINE WO CONT; Future    3. Numbness of right foot  -     REFERRAL TO PODIATRY    Patient has failed physical therapy and continues to have neuropathy symptoms, will get MRI to rule out disc herniation    Pathophysiology, recovery and rehabilitation process discussed and questions answered   Counseling for 30 Minutes of the total visit duration   Pictures and figures used as necessary   Provided reassurance   Monitor response to Physical Therapy   Recommend activity modification   Recommend  lower impact activities-walking, Eliptical, Nordic Track, cycling or swimming   Follow up in 4 week(s)             1) Remember to stay active and/or exercise regularly (I suggest 30-45 minutes daily)   2) For reliable dietary information, go to www. [employer].org. You may wish to consider seeing the nutritionist at [facility] [phone], also consider the [id number] I have discussed the diagnosis with the patient and the intended plan as seen in the above orders.   The patient has received an after-visit summary and questions were answered concerning future plans. Medication Side Effects and Warnings were discussed with patient,  Patient Labs were reviewed and or requested, and  Patient Past Records were reviewed and or requested  yes      Pt agrees to call or return to clinic and/or go to closest ER with any worsening of symptoms. This may include, but not limited to increased fever (>100.4) with NSAIDS or Tylenol, increased edema, confusion, rash, worsening of presenting symptoms. Please note that this dictation was completed with [employer], the computer voice recognition software. Quite often unanticipated grammatical, syntax, homophones, and other interpretive errors are inadvertently transcribed by the computer software. Please disregard these errors. Please excuse any errors that have escaped final proofreading. Thank you.

## 2022-03-18 ENCOUNTER — HOSPITAL ENCOUNTER (OUTPATIENT)
Dept: MRI IMAGING | Age: 72
Discharge: HOME | End: 2022-03-18
Attending: FAMILY MEDICINE
Payer: MEDICARE

## 2022-03-18 DIAGNOSIS — M54.50 CHRONIC MIDLINE LOW BACK PAIN WITHOUT SCIATICA: ICD-10-CM

## 2022-03-18 DIAGNOSIS — G89.29 CHRONIC MIDLINE LOW BACK PAIN WITHOUT SCIATICA: ICD-10-CM

## 2022-03-18 DIAGNOSIS — M54.50 ACUTE BILATERAL LOW BACK PAIN WITHOUT SCIATICA: ICD-10-CM

## 2022-03-18 PROCEDURE — 72148 MRI LUMBAR SPINE W/O DYE: CPT

## 2022-03-18 NOTE — PROGRESS NOTES
MRI shows small arthritis changes at all levels of lower back but no pinching of the nerve that is obvious.   Recommend follow-up with orthopedic spine if symptoms continue

## 2022-03-19 PROBLEM — Z71.89 ACP (ADVANCE CARE PLANNING): Status: ACTIVE | Noted: 2017-07-03

## 2022-03-20 PROBLEM — E78.5 HYPERLIPIDEMIA: Status: ACTIVE | Noted: 2017-01-19

## 2022-04-12 ENCOUNTER — TELEPHONE (OUTPATIENT)
Dept: ORTHOPEDIC SURGERY | Age: 72
End: 2022-04-12

## 2022-05-30 ENCOUNTER — HOSPITAL ENCOUNTER (EMERGENCY)
Age: 72
Discharge: HOME | End: 2022-05-30
Attending: EMERGENCY MEDICINE
Payer: MEDICARE

## 2022-05-30 VITALS
SYSTOLIC BLOOD PRESSURE: 178 MMHG | OXYGEN SATURATION: 99 % | DIASTOLIC BLOOD PRESSURE: 96 MMHG | RESPIRATION RATE: 20 BRPM | TEMPERATURE: 97.7 F | HEART RATE: 64 BPM

## 2022-05-30 DIAGNOSIS — T63.481A INSECT STINGS, ACCIDENTAL OR UNINTENTIONAL, INITIAL ENCOUNTER: Primary | ICD-10-CM

## 2022-05-30 PROCEDURE — 74011636637 HC RX REV CODE- 636/637: Performed by: PHYSICIAN ASSISTANT

## 2022-05-30 PROCEDURE — 99283 EMERGENCY DEPT VISIT LOW MDM: CPT

## 2022-05-30 RX ORDER — PREDNISONE 50 MG/1
50 TABLET ORAL DAILY
Qty: 3 TABLET | Refills: 0 | Status: SHIPPED | OUTPATIENT
Start: 2022-05-30 | End: 2022-06-02

## 2022-05-30 RX ADMIN — PREDNISONE 50 MG: 20 TABLET ORAL at 02:30

## 2022-05-30 NOTE — DISCHARGE INSTRUCTIONS
Please take benadryl at home to help with the itching. Note that this can make you drowsy so please do not take in combination with alcohol or while driving.

## 2022-05-30 NOTE — ED PROVIDER NOTES
Rafael Estes is a 67 y.o. male who presents to ED with cc of region of erythema and swelling to his posterior left shoulder and left arm X 2 days ago. Patient reports Saturday, around 1300, he was starting 4 times by all hornet. States he is still having pain, redness and swelling and notes concern for the amount of swelling that is present. He denies shortness of breath, throat swelling, chest pain, abdominal pain, nausea or vomiting. States he is not taking medication for his symptoms thus far. PMHx: Reviewed, as below. He denies hx of DM. PSHx: Reviewed, as below. PCP: Vashti Metcalf MD    There are no other complaints verbalized at this time. No past medical history on file. Past Surgical History:   Procedure Laterality Date    HX ORTHOPAEDIC           No family history on file. Social History     Socioeconomic History    Marital status:      Spouse name: Not on file    Number of children: Not on file    Years of education: Not on file    Highest education level: Not on file   Occupational History    Not on file   Tobacco Use    Smoking status: Current Some Day Smoker     Types: Cigars    Smokeless tobacco: Current User   Substance and Sexual Activity    Alcohol use: Yes     Alcohol/week: 2.0 standard drinks     Types: 2 Glasses of wine per week     Comment: 2 at most    Drug use: No    Sexual activity: Yes   Other Topics Concern    Not on file   Social History Narrative    Not on file     Social Determinants of Health     Financial Resource Strain:     Difficulty of Paying Living Expenses: Not on file   Food Insecurity:     Worried About Running Out of Food in the Last Year: Not on file    Monica of Food in the Last Year: Not on file   Transportation Needs:     Lack of Transportation (Medical): Not on file    Lack of Transportation (Non-Medical):  Not on file   Physical Activity:     Days of Exercise per Week: Not on file    Minutes of Exercise per Session: Not on file   Stress:     Feeling of Stress : Not on file   Social Connections:     Frequency of Communication with Friends and Family: Not on file    Frequency of Social Gatherings with Friends and Family: Not on file    Attends [employer] Services: Not on file    Active Member of Clubs or Organizations: Not on file    Attends Club or Organization Meetings: Not on file    Marital Status: Not on file   Intimate Partner Violence:     Fear of Current or Ex-Partner: Not on file    Emotionally Abused: Not on file    Physically Abused: Not on file    Sexually Abused: Not on file   Housing Stability:     Unable to Pay for Housing in the Last Year: Not on file    Number of [place] in the Last Year: Not on file    Unstable Housing in the Last Year: Not on file         ALLERGIES: Rosuvastatin    Review of Systems   HENT: Negative for trouble swallowing. Respiratory: Negative for shortness of breath. Gastrointestinal: Negative for abdominal pain, nausea and vomiting. Skin: Positive for color change. All other systems reviewed and are negative. Vitals:    05/30/22 0150   BP: (!) 178/96   Pulse: 64   Resp: 20   SpO2: 99%            Physical Exam  Vitals and nursing note reviewed. Constitutional:       Appearance: He is not diaphoretic. HENT:      Head: Normocephalic. Right Ear: External ear normal.      Left Ear: External ear normal.   Eyes:      General: No scleral icterus. Cardiovascular:      Rate and Rhythm: Normal rate and regular rhythm. Heart sounds: Normal heart sounds. No murmur heard. Pulmonary:      Effort: Pulmonary effort is normal. No respiratory distress. Breath sounds: Normal breath sounds. No stridor. No wheezing, rhonchi or rales. Abdominal:      General: Bowel sounds are normal. There is no distension. Palpations: Abdomen is soft. There is no mass. Tenderness: There is no abdominal tenderness.  There is no guarding or rebound. Hernia: No hernia is present. Musculoskeletal:         General: No swelling. Cervical back: Normal range of motion. Skin:     General: Skin is warm and dry. Comments: Irregular however oval-shaped region of erythema and sensitivity noted to posterior aspect of left shoulder as visible below. Posterior medial aspect of left upper arm also with erythema and swelling as visible below. Neurovascular intact distally with full range of motion noted to left side. Neurological:      Mental Status: He is alert and oriented to person, place, and time. Mental status is at baseline. MDM  Number of Diagnoses or Management Options     Amount and/or Complexity of Data Reviewed  Discuss the patient with other providers: yes (Dr Yi Wright, ED attending)           Procedures               VITAL SIGNS:  Vitals:    05/30/22 0150   BP: (!) 178/96   Pulse: 64   Resp: 20   Temp: 97.7 °F (36.5 °C)   SpO2: 99%         LABS:  No results found for this or any previous visit (from the past 24 hour(s)). IMAGING:  No orders to display         Medications During Visit:  Medications   predniSONE (DELTASONE) tablet 50 mg (has no administration in time range)         DECISION MAKING:    Nila Dmaian is a 67 y.o. male who comes in as above. Presents after being stung by hornets on Saturday afternoon. Continues with redness, pain and swelling over the region. Denies symptoms indicative of anaphylaxis type reaction. Will discharge with short course of steroids for large localized reaction. We have discussed use of benadryl at home. Pt states he drove himself to ED. Reports having had steroids in the past and tolerated well. Denies hx of DM. I have discussed care with ED attending. Pt has been given close return precautions as well as follow up recommendations. Opportunity for questions presented. Pt verbalizes their understanding and agreement with care plan. IMPRESSION:  1.  Insect stings, accidental or unintentional, initial encounter        DISPOSITION:  Discharged      Current Discharge Medication List      START taking these medications    Details   predniSONE (DELTASONE) 50 mg tablet Take 1 Tablet by mouth daily for 3 days. Qty: 3 Tablet, Refills: 0  Start date: 5/30/2022, End date: 6/2/2022              Follow-up Information     Follow up With Specialties Details Why Contact Info    Nahun Stanford MD Family Medicine, Sports Medicine Physician Call   [address] [address] [phone]      [address] [facility] [facility] Go to  As needed, If symptoms worsen [address] [facility]  [phone]            The patient is asked to follow-up with their primary care provider and any other physicians as above. We have discussed strict return precautions and the patient is asked to return promptly for any increased concerns or worsening of symptoms and for return precautions regarding their symptoms today. They can return to this emergency department or any other emergency department. I have discussed with them results as above and presented opportunity for questions. They verbalize their understanding of the above and agreement with care plan. Please note that this dictation was completed with [employer] the [employer] voice recognition software. Quite often unanticipated grammatical, syntax, homophones, and other interpretive errors are inadvertently transcribed by the computer software. Please disregard these errors. Please excuse any errors that have escaped final proofreading.

## 2022-05-30 NOTE — ED TRIAGE NOTES
Pt arrives ambulatory from home for a hornet sting on his L shoulder and upper arm. States this happened yesterday and he is still having pain, redness and swelling where he was stung. No difficulty breathing noted. Denies feeling swollen in his throat or difficulty swallowing. A&Ox4.

## 2022-05-31 ENCOUNTER — OFFICE VISIT (OUTPATIENT)
Dept: ORTHOPEDIC SURGERY | Age: 72
End: 2022-05-31
Payer: MEDICARE

## 2022-05-31 VITALS — WEIGHT: 166 LBS | BODY MASS INDEX: 23.77 KG/M2 | HEIGHT: 70 IN

## 2022-05-31 DIAGNOSIS — R20.2 NUMBNESS AND TINGLING OF LOWER EXTREMITY: ICD-10-CM

## 2022-05-31 DIAGNOSIS — G89.29 CHRONIC BILATERAL LOW BACK PAIN WITHOUT SCIATICA: ICD-10-CM

## 2022-05-31 DIAGNOSIS — M79.605 PAIN IN BOTH LOWER EXTREMITIES: ICD-10-CM

## 2022-05-31 DIAGNOSIS — M54.50 CHRONIC BILATERAL LOW BACK PAIN WITHOUT SCIATICA: ICD-10-CM

## 2022-05-31 DIAGNOSIS — M79.604 PAIN IN BOTH LOWER EXTREMITIES: ICD-10-CM

## 2022-05-31 DIAGNOSIS — M51.36 DDD (DEGENERATIVE DISC DISEASE), LUMBAR: Primary | ICD-10-CM

## 2022-05-31 DIAGNOSIS — R20.0 NUMBNESS AND TINGLING OF LOWER EXTREMITY: ICD-10-CM

## 2022-05-31 PROCEDURE — 1123F ACP DISCUSS/DSCN MKR DOCD: CPT | Performed by: ORTHOPAEDIC SURGERY

## 2022-05-31 PROCEDURE — 3017F COLORECTAL CA SCREEN DOC REV: CPT | Performed by: ORTHOPAEDIC SURGERY

## 2022-05-31 PROCEDURE — G8427 DOCREV CUR MEDS BY ELIG CLIN: HCPCS | Performed by: ORTHOPAEDIC SURGERY

## 2022-05-31 PROCEDURE — G8510 SCR DEP NEG, NO PLAN REQD: HCPCS | Performed by: ORTHOPAEDIC SURGERY

## 2022-05-31 PROCEDURE — 1101F PT FALLS ASSESS-DOCD LE1/YR: CPT | Performed by: ORTHOPAEDIC SURGERY

## 2022-05-31 PROCEDURE — 99204 OFFICE O/P NEW MOD 45 MIN: CPT | Performed by: ORTHOPAEDIC SURGERY

## 2022-05-31 PROCEDURE — G8536 NO DOC ELDER MAL SCRN: HCPCS | Performed by: ORTHOPAEDIC SURGERY

## 2022-05-31 PROCEDURE — G8420 CALC BMI NORM PARAMETERS: HCPCS | Performed by: ORTHOPAEDIC SURGERY

## 2022-05-31 NOTE — PATIENT INSTRUCTIONS
Spondylolysis and Spondylolisthesis: Exercises  Introduction  Here are some examples of exercises for you to try. The exercises may be suggested for a condition or for rehabilitation. Start each exercise slowly. Ease off the exercises if you start to have pain. You will be told when to start these exercises and which ones will work best for you. How to do the exercises  Single knee-to-chest    1. Lie on your back with your knees bent and your feet flat on the floor. You can put a small pillow under your head and neck if it is more comfortable. 2. Bring one knee to your chest, keeping the other foot flat on the floor. 3. Keep your lower back pressed to the floor. Hold for 15 to 30 seconds. 4. Relax, and lower the knee to the starting position. 5. Repeat with the other leg. Repeat 2 to 4 times with each leg. 6. To get more stretch, put your other leg flat on the floor while pulling your knee to your chest.  Double knee-to-chest    1. Lie on your back with your knees bent and your feet flat on the floor. You can put a small pillow under your head and neck if it is more comfortable. 2. Bring both knees to your chest.  3. Keep your lower back pressed to the floor. Hold for 15 to 30 seconds. 4. Relax, and lower your knees to the starting position. 5. Repeat 2 to 4 times. Alternate arm and leg (bird dog) exercise    Do this exercise slowly. Try to keep your body straight at all times. 1. Start on the floor, on your hands and knees. 2. Tighten your belly muscles by pulling your belly button in toward your spine. Be sure you continue to breathe normally and do not hold your breath. 3. Raise one arm off the floor, and hold it straight out in front of you. Be careful not to let your shoulder drop down, because that will twist your trunk. 4. Hold for about 6 seconds, then lower your arm and switch to your other arm. 5. Repeat 8 to 12 times on each arm.   6. When you can do this exercise with ease and no pain, repeat steps 1 through 5. But this time do it with one leg raised off the floor, holding your leg straight out behind you. Be careful not to let your hip drop down, because that will twist your trunk. 7. When holding your leg straight out becomes easier, try raising your opposite arm at the same time, and repeat steps 1 through 5. Bridging    1. Lie on your back with both knees bent. Your knees should be bent about 90 degrees. 2. Then push your feet into the floor, squeeze your buttocks, and lift your hips off the floor until your shoulders, hips, and knees are all in a straight line. 3. Hold for about 6 seconds as you continue to breathe normally, and then slowly lower your hips back down to the floor and rest for up to 10 seconds. 4. Repeat 8 to 12 times. Curl-ups    1. Lie on the floor on your back with your knees bent at a 90-degree angle. Your feet should be flat on the floor, about 12 inches from your buttocks. 2. Cross your arms over your chest. If this bothers your neck, try putting your hands behind your neck (not your head), with your elbows spread apart. 3. Slowly tighten your belly muscles and raise your shoulder blades off the floor. 4. Keep your head in line with your body, and do not press your chin to your chest.  5. Hold this position for 1 or 2 seconds, then slowly lower yourself back down to the floor. 6. Repeat 8 to 12 times. Plank    Do this exercise slowly. Try to keep your body straight at all times, and do not let one hip drop lower than the other. 1. Lie on your stomach, resting your upper body on your forearms. 2. Tighten your belly muscles by pulling your belly button in toward your spine. 3. Keeping your knees on the floor, press down with your forearms to lift your upper body off the floor. 4. Hold for about 6 seconds, then lower your body to the floor. Rest for up to 10 seconds. 5. Repeat 8 to 12 times.   6. Over time, work up to holding for 15 to 30 seconds each time.  7. If this exercise is easy to do with your knees on the floor, try doing this exercise with your knees and legs straight, supported by your toes on the floor. Follow-up care is a key part of your treatment and safety. Be sure to make and go to all appointments, and call your doctor if you are having problems. It's also a good idea to know your test results and keep a list of the medicines you take. Where can you learn more? Go to http://www.patricia.com/  Enter M245 in the search box to learn more about \"Spondylolysis and Spondylolisthesis: Exercises. \"  Current as of: July 1, 2021               Content Version: 13.2  © 2006-2022 Healthwise, Incorporated. Care instructions adapted under license by [employer] (which disclaims liability or warranty for this information). If you have questions about a medical condition or this instruction, always ask your healthcare professional. [address] any warranty or liability for your use of this information.

## 2022-05-31 NOTE — LETTER
5/31/2022    Patient: Shyam Lyman. YOB: 1950   Date of Visit: 5/31/2022     Judy Faye MD  [address] [facility] [address]  [address] [facility] [address]    Dear Judy Faye MD,      Thank you for referring Mr. Talya Hernández to [facility] for evaluation. My notes for this consultation are attached. If you have questions, please do not hesitate to call me. I look forward to following your patient along with you.       Sincerely,    Sonia Clements MD

## 2022-05-31 NOTE — PROGRESS NOTES
Elio Walters (: 1950) is a 67 y.o. male, patient, here for evaluation of the following chief complaint(s):  LOW BACK PAIN and Leg Pain       ASSESSMENT/PLAN:    Below is the assessment and plan developed based on review of pertinent history, physical exam, labs, studies, and medications. At this point he is mainly concerned about his peripheral neuropathy. He is already had a vascular study which does not show that is vascular nature. He does have degenerative changes at L4-5 and some mild foraminal stenosis on his MRI. He does not want injections for his lower back pain. I will order bilateral EMGs of his lower extremities to see about the numbness and tingling in his lower extremities. We will see him back with test results    1. DDD (degenerative disc disease), lumbar  -     XR SPINE LUMB MIN 4 V; Future  2. Pain in both lower extremities  -     EMG LIMITED; Future  3. Numbness and tingling of lower extremity  -     EMG LIMITED; Future  4. Chronic bilateral low back pain without sciatica      No follow-ups on file. SUBJECTIVE/OBJECTIVE:  Elio Walters (: 1950) is a 67 y.o. male. No flowsheet data found. He comes in today for a self-referral.  He has chronic back pain through the years in the [employer]. He is more concerned recently about the increasing numbness and tingling in the lower extremities. It is greater in the left side than the right side. It does not seem to follow a specific dermatomal pattern. It is present most of the time but there is no inciting factors and no relieving factors. He has had a vascular work-up by podiatrist which apparently is negative. He denies any bowel bladder difficulties    Imaging:    XR Results (most recent):  Results from Appointment encounter on 22    XR SPINE LUMB MIN 4 V    Narrative  AP and lateral of the lumbar spine reviewed today. Flexion-extension views were noted as well.   He has moderate degenerative changes which are age-related. He has significant spondylosis mainly at L4-5 with anterior posterior spurring. No gross instability. MRI Results (most recent): MRI Results (most recent):  Results from [place] encounter on 03/18/22    MRI LUMB SPINE WO CONT    Narrative  EXAM: MRI LUMB SPINE WO CONT    INDICATION: Low back pain, unspecified    COMPARISON: Lumbar spine radiographs 12/15/2021    TECHNIQUE: MR imaging of the lumbar spine was performed using the following  sequences: sagittal T1, T2, STIR;  axial T1, T2.    CONTRAST:  None. FINDINGS:    There is normal alignment of the lumbar spine. Vertebral body heights are  maintained. Modic type I endplate degenerative changes at L4-5. The conus medullaris terminates at T12. Signal and caliber of the distal spinal  cord are within normal limits. Multiple T2 hyperintense renal lesions bilaterally indicative of cysts. Lower thoracic spine: No significant herniation or stenosis at T11-12 or T12-L1. L1-L2: Mild disc desiccation and height loss. No significant foraminal or canal  stenosis. L2-L3: Mild disc desiccation and height loss. Mild diffuse disc bulge without  significant foraminal or canal stenosis. L3-L4: Disc desiccation and height loss with mild diffuse disc bulge without  significant foraminal or canal stenosis. L4-L5: Facet joint arthropathy, ligamentum flavum thickening, and mild diffuse  disc bulge with mild right foraminal stenosis. No significant canal stenosis. L5-S1: No herniation or stenosis. Impression  1. Multilevel disc desiccation and mild disc bulges throughout the lumbar  spine. 2.  Mild right foraminal stenosis at L4-5.  3.  Modic type I endplate degenerative changes at L4-5.          Allergies   Allergen Reactions    Rosuvastatin Nausea and Vomiting       Current Outpatient Medications   Medication Sig    predniSONE (DELTASONE) 50 mg tablet Take 1 Tablet by mouth daily for 3 days. (Patient not taking: Reported on 5/31/2022)    latanoprost (XALATAN) 0.005 % ophthalmic solution INSTILL 1 DROP IN BOTH EYES ONCE DAILY    rosuvastatin (CRESTOR) 20 mg tablet Take 1 Tablet by mouth nightly. (Patient not taking: Reported on 5/31/2022)    fluticasone (FLONASE) 50 mcg/actuation nasal spray 2 Sprays by Both Nostrils route daily.  efinaconazole (JUBLIA) erlnida topical solution Apply  to affected area daily. (Patient not taking: Reported on 5/31/2022)    Cholecalciferol, Vitamin D3, (VITAMIN D3) 2,000 unit cap capsule Take  by mouth two (2) times a day.  omega-3 fatty acids-vitamin e (FISH OIL) 1,000 mg cap Take 1 Cap by mouth.  ascorbic acid, vitamin C, (VITAMIN C) 500 mg tablet Take 1,000 mg by mouth.  glucosamine-chondroitin (ELATION) 1,500-1,200 mg/30 mL liqd Take  by mouth. No current facility-administered medications for this visit. History reviewed. No pertinent past medical history. Past Surgical History:   Procedure Laterality Date    HX ORTHOPAEDIC         History reviewed. No pertinent family history. Social History     Tobacco Use    Smoking status: Current Some Day Smoker     Types: Cigars    Smokeless tobacco: Current User   Vaping Use    Vaping Use: Not on file   Substance Use Topics    Alcohol use: Yes     Alcohol/week: 2.0 standard drinks     Types: 2 Glasses of wine per week     Comment: 2 at most    Drug use: No        Review of Systems       Vitals:  Ht 5' 10\" (1.778 m)   Wt 166 lb (75.3 kg)   BMI 23.82 kg/m²    Body mass index is 23.82 kg/m². Ortho Exam       Alert and [place]  x 3    Normal gait and station; normal posture    No assistive devices today. Cervical spine:  Examination of the cervical spine demonstrates no tenderness on palpation, no pain, no swelling or edema, with normal lumbar range of motion.     Thoracic spine: Examination of the thoracic spine demonstrates no tenderness on palpation, no pain, no swelling or edema. Normal sensation and range of motion. Lumbar spine:     Examination of the lumbar spine demonstrates on inspection: No abnormal cutaneous markings. No step-offs noted. No surgical incisions. On palpation: Generalized tenderness in the lower back and buttock region greater to the left. Range of motion: Decreased range of motion    Motor examination:  Right iliopsoas 5/5,  left iliopsoas 5/5, right quad 5/5, left quad 5/5, right anterior tibialis 5/5, left anterior tibialis 5/5, right EHL 5/5, left EHL 5/5, right gastrocnemius 5/5 , left gastrocnemius 5/5    Sensory examination: Reveals some generalized reported paresthesias in the lower extremities but no specific dermatomal pattern    Reflexes: Left knee 2/2, right knee 2/2, right ankle 2/2, left ankle 2/2    Functional testing: Straight leg test negative bilaterally; bowstring sign negative bilaterally    Babinsksi and Clonus negative bilateral.        An electronic signature was used to authenticate this note.   -- Janelle Doherty MD

## 2022-10-20 ENCOUNTER — OFFICE VISIT (OUTPATIENT)
Dept: INTERNAL MEDICINE CLINIC | Age: 72
End: 2022-10-20
Payer: MEDICARE

## 2022-10-20 VITALS
DIASTOLIC BLOOD PRESSURE: 80 MMHG | TEMPERATURE: 98.5 F | BODY MASS INDEX: 24.05 KG/M2 | SYSTOLIC BLOOD PRESSURE: 148 MMHG | RESPIRATION RATE: 16 BRPM | OXYGEN SATURATION: 99 % | HEIGHT: 70 IN | WEIGHT: 168 LBS | HEART RATE: 71 BPM

## 2022-10-20 DIAGNOSIS — E78.5 HYPERLIPIDEMIA, UNSPECIFIED HYPERLIPIDEMIA TYPE: ICD-10-CM

## 2022-10-20 DIAGNOSIS — Z13.39 SCREENING FOR ALCOHOLISM: ICD-10-CM

## 2022-10-20 DIAGNOSIS — Z23 ENCOUNTER FOR IMMUNIZATION: ICD-10-CM

## 2022-10-20 DIAGNOSIS — Z00.00 MEDICARE ANNUAL WELLNESS VISIT, SUBSEQUENT: Primary | ICD-10-CM

## 2022-10-20 PROCEDURE — 1123F ACP DISCUSS/DSCN MKR DOCD: CPT | Performed by: FAMILY MEDICINE

## 2022-10-20 PROCEDURE — G0008 ADMIN INFLUENZA VIRUS VAC: HCPCS | Performed by: FAMILY MEDICINE

## 2022-10-20 PROCEDURE — 1101F PT FALLS ASSESS-DOCD LE1/YR: CPT | Performed by: FAMILY MEDICINE

## 2022-10-20 PROCEDURE — 3017F COLORECTAL CA SCREEN DOC REV: CPT | Performed by: FAMILY MEDICINE

## 2022-10-20 PROCEDURE — G0439 PPPS, SUBSEQ VISIT: HCPCS | Performed by: FAMILY MEDICINE

## 2022-10-20 PROCEDURE — 90694 VACC AIIV4 NO PRSRV 0.5ML IM: CPT | Performed by: FAMILY MEDICINE

## 2022-10-20 PROCEDURE — G8510 SCR DEP NEG, NO PLAN REQD: HCPCS | Performed by: FAMILY MEDICINE

## 2022-10-20 PROCEDURE — G8428 CUR MEDS NOT DOCUMENT: HCPCS | Performed by: FAMILY MEDICINE

## 2022-10-20 PROCEDURE — G8420 CALC BMI NORM PARAMETERS: HCPCS | Performed by: FAMILY MEDICINE

## 2022-10-20 PROCEDURE — G8536 NO DOC ELDER MAL SCRN: HCPCS | Performed by: FAMILY MEDICINE

## 2022-10-20 NOTE — PROGRESS NOTES
Chief Complaint   Patient presents with    Complete Physical     Patient is here for a wellness visit. he is a 67y.o. year old male who presents for CPE. Complete Physical Exam Questions:    1. Do you follow a low fat diet?  no  2. Are you up to date on your Tdap (<10 years)? Yes  3. Have you ever had a Pneumovax vaccine (>65)? No   PCV13 No   PPSV23 No  4. Have you had Zoster vaccine (>60)? No  5. Have you had the HPV - Gardasil (13- 26)? Not applicable  6. Do you follow an exercise program?  yes  7. Do you smoke?  no If > 65 and smoker, have you had a abdominal aortic aneurysm ultrasound screen? No  8. Do you consider yourself overweight?  no  9. Is there a family history of CAD< age 48? No  10. Is there a family history of Cancer? Yes  11. Do you know your Cancer risks? No  12. Have you had a colonoscopy? Yes  13. Have you been tested for HIV or other STI's? No HIV today(18-64 y/o)? No   14. Have you had an EKG in the last five years(>50)? Yes  15. Have you had a PSA test done this year (50-69)? No    Other complaints: none    Reviewed and agree with Nurse Note and duplicated in this note. Reviewed PmHx, RxHx, FmHx, SocHx, AllgHx and updated and dated in the chart. History reviewed. No pertinent family history. History reviewed. No pertinent past medical history. Social History     Socioeconomic History    Marital status:    Tobacco Use    Smoking status: Some Days     Types: Cigars    Smokeless tobacco: Current   Substance and Sexual Activity    Alcohol use:  Yes     Alcohol/week: 2.0 standard drinks     Types: 2 Glasses of wine per week     Comment: 2 at most    Drug use: No    Sexual activity: Not Currently        Review of Systems - negative except as listed above  {ros master:[id number]}    Objective:     Vitals:    10/20/22 1437   Resp: 16   Weight: 168 lb (76.2 kg)   Height: 5' 10\" (1.778 m)       Physical Examination: {male adult master: 78546}       Assessment/ Plan: Labs to be drawn: CBC, CMP, Lipid    {MU BMI REASON:[id number]}        I have discussed the diagnosis with the patient and the intended plan as seen in the above orders. The patient has received an after-visit summary and questions were answered concerning future plans. Medication Side Effects and Warnings were discussed with patient,  Patient Labs were reviewed and or requested, and  Patient Past Records were reviewed and or requested  {yes/ no default yes:19425::\"yes\"}         Pt agrees to call or return to clinic and/or go to closest ER with any worsening of symptoms. This may include, but not limited to increased fever (>100.4) with NSAIDS or Tylenol, increased edema, confusion, rash, worsening of presenting symptoms. Please note that this dictation was completed with [employer], the computer voice recognition software. Quite often unanticipated grammatical, syntax, homophones, and other interpretive errors are inadvertently transcribed by the computer software. Please disregard these errors. Please excuse any errors that have escaped final proofreading. Thank you. This is the Subsequent Medicare Annual Wellness Exam, performed 12 months or more after the Initial AWV or the last Subsequent AWV    I have reviewed the patient's medical history in detail and updated the computerized patient record. Assessment/Plan   Education and counseling provided:  Are appropriate based on today's review and evaluation    1.  Hyperlipidemia, unspecified hyperlipidemia type       Depression Risk Factor Screening     3 most recent PHQ Screens 10/20/2022   Little interest or pleasure in doing things Not at all   Feeling down, depressed, irritable, or hopeless Not at all   Total Score PHQ 2 0       Alcohol & Drug Abuse Risk Screen    Do you average more than 1 drink per night or more than 7 drinks a week: No    In the past three months have you have had more than 4 drinks containing alcohol on one occasion: No          Functional Ability and Level of Safety    Hearing: Hearing is good. Activities of Daily Living: The home contains: no safety equipment. Patient does total self care      Ambulation: with no difficulty     Fall Risk:  Fall Risk Assessment, last 12 mths 10/20/2022   Able to walk? Yes   Fall in past 12 months? 0   Do you feel unsteady? 0   Are you worried about falling -      Abuse Screen:  Patient is not abused       Cognitive Screening    Has your family/caregiver stated any concerns about your memory: no     Cognitive Screening: Normal - Verbal Fluency Test    Health Maintenance Due     Health Maintenance Due   Topic Date Due    COVID-19 Vaccine (1) Never done    Shingrix Vaccine Age 50> (1 of 2) Never done    Pneumococcal 65+ years (2 - PPSV23 or PCV20) 03/01/2018    Flu Vaccine (1) 08/01/2022    Medicare Yearly Exam  10/15/2022       Patient Care Team   Patient Care Team:  Anisha Vidal MD as PCP - General (Family Medicine)  Anisha Vidal MD as PCP - [facility] Empaneled Provider    History     Patient Active Problem List   Diagnosis Code    Hyperlipidemia E78.5    ACP (advance care planning) Z71.89     History reviewed. No pertinent past medical history. Past Surgical History:   Procedure Laterality Date    HX ORTHOPAEDIC       Current Outpatient Medications   Medication Sig Dispense Refill    latanoprost (XALATAN) 0.005 % ophthalmic solution INSTILL 1 DROP IN BOTH EYES ONCE DAILY      fluticasone propionate (FLONASE) 50 mcg/actuation nasal spray 2 Sprays by Both Nostrils route daily. Cholecalciferol, Vitamin D3, (VITAMIN D3) 2,000 unit cap capsule Take  by mouth two (2) times a day. omega-3 fatty acids-vitamin e 1,000 mg cap Take 1 Cap by mouth. ascorbic acid, vitamin C, (VITAMIN C) 500 mg tablet Take 1,000 mg by mouth. glucosamine-chondroitin (ELATION) 1,500-1,200 mg/30 mL liqd Take  by mouth. rosuvastatin (CRESTOR) 20 mg tablet Take 1 Tablet by mouth nightly.  (Patient not taking: Reported on 5/31/2022) 90 Tablet 1    efinaconazole (JUBLIA) erlinda topical solution Apply  to affected area daily. (Patient not taking: No sig reported)       Allergies   Allergen Reactions    Rosuvastatin Nausea and Vomiting       History reviewed. No pertinent family history. Social History     Tobacco Use    Smoking status: Some Days     Types: Cigars    Smokeless tobacco: Current   Substance Use Topics    Alcohol use:  Yes     Alcohol/week: 2.0 standard drinks     Types: 2 Glasses of wine per week     Comment: 2 at most         [facility]

## 2022-10-20 NOTE — PROGRESS NOTES
----- Message from FAIZA Nunez sent at 6/1/2020  4:53 PM CDT -----  Vitamin D level is very low. Recommend Vitamin D 50,000 iu weekly x 8 weeks (place order if patient is agreeable), then vitamin D3 5000 iu daily. Check repeat Vitamin D level in 8 weeks (place order if agreeable) for Dx. medication management and vitamin D Deficiency.            This is the Subsequent Medicare Annual Wellness Exam, performed 12 months or more after the Initial AWV or the last Subsequent AWV    I have reviewed the patient's medical history in detail and updated the computerized patient record. he is a 67y.o. year old male who presents for CPE. Complete Physical Exam Questions:    1. Do you follow a low fat diet?  no  2. Are you up to date on your Tdap (<10 years)? Yes  3. Have you ever had a Pneumovax vaccine (>65)? No   PCV13 No   PPSV23 No  4. Have you had Zoster vaccine (>60)? No  5. Have you had the HPV - Gardasil (13- 26)? Not applicable  6. Do you follow an exercise program?  yes  7. Do you smoke?  no If > 65 and smoker, have you had a abdominal aortic aneurysm ultrasound screen? No  8. Do you consider yourself overweight?  no  9. Is there a family history of CAD< age 48? No  10. Is there a family history of Cancer? Yes  11. Do you know your Cancer risks? No  12. Have you had a colonoscopy? Yes  13. Have you been tested for HIV or other STI's? No HIV today(18-66 y/o)? No   14. Have you had an EKG in the last five years(>50)? Yes  15. Have you had a PSA test done this year (50-69)? No    Assessment/Plan   Education and counseling provided:  Are appropriate based on today's review and evaluation    1. Hyperlipidemia, unspecified hyperlipidemia type  2. Medicare annual wellness visit, subsequent  3.  Screening for alcoholism  -     [place] ANNUAL ALCOHOL SCREEN 15 MIN       Depression Risk Factor Screening     3 most recent PHQ Screens 10/20/2022   Little interest or pleasure in doing things Not at all   Feeling down, depressed, irritable, or hopeless Not at all   Total Score PHQ 2 0       Alcohol & Drug Abuse Risk Screen    Do you average more than 1 drink per night or more than 7 drinks a week: No    In the past three months have you have had more than 4 drinks containing alcohol on one occasion: No          Functional Ability and Level of Safety Hearing: Hearing is good. Activities of Daily Living: The home contains: no safety equipment. Patient does total self care      Ambulation: with no difficulty     Fall Risk:  Fall Risk Assessment, last 12 mths 10/20/2022   Able to walk? Yes   Fall in past 12 months? 0   Do you feel unsteady? 0   Are you worried about falling -      Abuse Screen:  Patient is not abused       Cognitive Screening    Has your family/caregiver stated any concerns about your memory: no     Cognitive Screening: Normal - MMSE (Mini Mental Status Exam)    Health Maintenance Due     Health Maintenance Due   Topic Date Due    COVID-19 Vaccine (1) Never done    Shingrix Vaccine Age 50> (1 of 2) Never done    Pneumococcal 65+ years (2 - PPSV23 or PCV20) 03/01/2018    Flu Vaccine (1) 08/01/2022       Patient Care Team   Patient Care Team:  Babita Reed MD as PCP - General (Family Medicine)  Babita Reed MD [facility]    History     Patient Active Problem List   Diagnosis Code    Hyperlipidemia E78.5    ACP (advance care planning) Z71.89     History reviewed. No pertinent past medical history. Past Surgical History:   Procedure Laterality Date    HX ORTHOPAEDIC       Current Outpatient Medications   Medication Sig Dispense Refill    latanoprost (XALATAN) 0.005 % ophthalmic solution INSTILL 1 DROP IN BOTH EYES ONCE DAILY      fluticasone propionate (FLONASE) 50 mcg/actuation nasal spray 2 Sprays by Both Nostrils route daily. Cholecalciferol, Vitamin D3, (VITAMIN D3) 2,000 unit cap capsule Take  by mouth two (2) times a day. omega-3 fatty acids-vitamin e 1,000 mg cap Take 1 Cap by mouth. ascorbic acid, vitamin C, (VITAMIN C) 500 mg tablet Take 1,000 mg by mouth. glucosamine-chondroitin (ELATION) 1,500-1,200 mg/30 mL liqd Take  by mouth. rosuvastatin (CRESTOR) 20 mg tablet Take 1 Tablet by mouth nightly.  (Patient not taking: Reported on 5/31/2022) 90 Tablet 1    efinaconazole (Real [place]) erlinda topical solution Apply  to affected area daily. (Patient not taking: No sig reported)       Allergies   Allergen Reactions    Rosuvastatin Nausea and Vomiting       History reviewed. No pertinent family history. Social History     Tobacco Use    Smoking status: Some Days     Types: Cigars    Smokeless tobacco: Current   Substance Use Topics    Alcohol use:  Yes     Alcohol/week: 2.0 standard drinks     Types: 2 Glasses of wine per week     Comment: 2 at most         Onelia Willis MD

## 2022-10-20 NOTE — PATIENT INSTRUCTIONS
Medicare Wellness Visit, Male    The best way to live healthy is to have a lifestyle where you eat a well-balanced diet, exercise regularly, limit alcohol use, and quit all forms of tobacco/nicotine, if applicable. Regular preventive services are another way to keep healthy. Preventive services (vaccines, screening tests, monitoring & exams) can help personalize your care plan, which helps you manage your own care. Screening tests can find health problems at the earliest stages, when they are easiest to treat. [facility] follows the current, evidence-based guidelines published by the [facility] ([facility]) when recommending preventive services for our patients. Because we follow these guidelines, sometimes recommendations change over time as research supports it. (For example, a prostate screening blood test is no longer routinely recommended for men with no symptoms). Of course, you and your doctor may decide to screen more often for some diseases, based on your risk and co-morbidities (chronic disease you are already diagnosed with). Preventive services for you include:  - Medicare offers their members a free annual wellness visit, which is time for you and your primary care provider to discuss and plan for your preventive service needs. Take advantage of this benefit every year!  -All adults over age 72 should receive the recommended pneumonia vaccines. Current USPSTF guidelines recommend a series of two vaccines for the best pneumonia protection.   -All adults should have a flu vaccine yearly and tetanus vaccine every 10 years.  -All adults age 48 and older should receive the shingles vaccines (series of two vaccines).        -All adults age 38-68 who are overweight should have a diabetes screening test once every three years.   -Other screening tests & preventive services for persons with diabetes include: an eye exam to screen for diabetic retinopathy, a kidney function test, a foot exam, and stricter control over your cholesterol.   -Cardiovascular screening for adults with routine risk involves an electrocardiogram (ECG) at intervals determined by the provider.   -Colorectal cancer screening should be done for adults age 54-65 with no increased risk factors for colorectal cancer. There are a number of acceptable methods of screening for this type of cancer. Each test has its own benefits and drawbacks. Discuss with your provider what is most appropriate for you during your annual wellness visit. The different tests include: colonoscopy (considered the best screening method), a fecal occult blood test, a fecal DNA test, and sigmoidoscopy.  -All adults born between [facility] should be screened once for Hepatitis C.  -An Abdominal Aortic Aneurysm (AAA) Screening is recommended for men age 73-68 who has ever smoked in their lifetime. Here is a list of your current Health Maintenance items (your personalized list of preventive services) with a due date:  Health Maintenance Due   Topic Date Due    COVID-19 Vaccine (1) Never done    Shingles Vaccine (1 of 2) Never done    Pneumococcal Vaccine (2 - PPSV23 or PCV20) 03/01/2018    Yearly Flu Vaccine (1) 08/01/2022       Medicare Wellness Visit, Male    The best way to live healthy is to have a lifestyle where you eat a well-balanced diet, exercise regularly, limit alcohol use, and quit all forms of tobacco/nicotine, if applicable. Regular preventive services are another way to keep healthy. Preventive services (vaccines, screening tests, monitoring & exams) can help personalize your care plan, which helps you manage your own care. Screening tests can find health problems at the earliest stages, when they are easiest to treat.    [facility] follows the current, evidence-based guidelines published by the [place] Guicho Heber (USPSTF) when recommending preventive services for our patients. Because we follow these guidelines, sometimes recommendations change over time as research supports it. (For example, a prostate screening blood test is no longer routinely recommended for men with no symptoms). Of course, you and your doctor may decide to screen more often for some diseases, based on your risk and co-morbidities (chronic disease you are already diagnosed with). Preventive services for you include:  - Medicare offers their members a free annual wellness visit, which is time for you and your primary care provider to discuss and plan for your preventive service needs. Take advantage of this benefit every year!  -All adults over age 72 should receive the recommended pneumonia vaccines. Current USPSTF guidelines recommend a series of two vaccines for the best pneumonia protection.   -All adults should have a flu vaccine yearly and tetanus vaccine every 10 years.  -All adults age 48 and older should receive the shingles vaccines (series of two vaccines). -All adults age 38-68 who are overweight should have a diabetes screening test once every three years.   -Other screening tests & preventive services for persons with diabetes include: an eye exam to screen for diabetic retinopathy, a kidney function test, a foot exam, and stricter control over your cholesterol.   -Cardiovascular screening for adults with routine risk involves an electrocardiogram (ECG) at intervals determined by the provider.   -Colorectal cancer screening should be done for adults age 54-65 with no increased risk factors for colorectal cancer. There are a number of acceptable methods of screening for this type of cancer. Each test has its own benefits and drawbacks. Discuss with your provider what is most appropriate for you during your annual wellness visit.  The different tests include: colonoscopy (considered the best screening method), a fecal occult blood test, a fecal DNA test, and sigmoidoscopy.  -All adults born between 1945 and 1965 should be screened once for Hepatitis C.  -An Abdominal Aortic Aneurysm (AAA) Screening is recommended for men age 73-68 who has ever smoked in their lifetime.      Here is a list of your current Health Maintenance items (your personalized list of preventive services) with a due date:  Health Maintenance Due   Topic Date Due    COVID-19 Vaccine (1) Never done    Shingles Vaccine (1 of 2) Never done    Pneumococcal Vaccine (2 - PPSV23 or PCV20) 03/01/2018    Yearly Flu Vaccine (1) 08/01/2022

## 2023-01-19 ENCOUNTER — PATIENT MESSAGE (OUTPATIENT)
Dept: INTERNAL MEDICINE CLINIC | Age: 73
End: 2023-01-19

## 2023-01-19 ENCOUNTER — OFFICE VISIT (OUTPATIENT)
Dept: INTERNAL MEDICINE CLINIC | Age: 73
End: 2023-01-19
Payer: MEDICARE

## 2023-01-19 VITALS
BODY MASS INDEX: 24.05 KG/M2 | DIASTOLIC BLOOD PRESSURE: 77 MMHG | HEIGHT: 70 IN | WEIGHT: 168 LBS | OXYGEN SATURATION: 99 % | HEART RATE: 73 BPM | SYSTOLIC BLOOD PRESSURE: 131 MMHG | TEMPERATURE: 98.3 F | RESPIRATION RATE: 18 BRPM

## 2023-01-19 DIAGNOSIS — E78.5 HYPERLIPIDEMIA, UNSPECIFIED HYPERLIPIDEMIA TYPE: ICD-10-CM

## 2023-01-19 DIAGNOSIS — D17.1 LIPOMA OF TORSO: ICD-10-CM

## 2023-01-19 DIAGNOSIS — M76.821 POSTERIOR TIBIAL TENDINITIS OF RIGHT LOWER EXTREMITY: ICD-10-CM

## 2023-01-19 DIAGNOSIS — M79.671 RIGHT FOOT PAIN: ICD-10-CM

## 2023-01-19 DIAGNOSIS — L72.3 SEBACEOUS CYST: ICD-10-CM

## 2023-01-19 DIAGNOSIS — M25.511 ACUTE PAIN OF RIGHT SHOULDER: ICD-10-CM

## 2023-01-19 DIAGNOSIS — R10.9 STOMACH DISCOMFORT: Primary | ICD-10-CM

## 2023-01-19 DIAGNOSIS — R53.83 FATIGUE, UNSPECIFIED TYPE: ICD-10-CM

## 2023-01-19 RX ORDER — EZETIMIBE 10 MG/1
10 TABLET ORAL DAILY
Qty: 30 TABLET | Refills: 4 | Status: SHIPPED | OUTPATIENT
Start: 2023-01-19

## 2023-01-19 NOTE — PROGRESS NOTES
Chief Complaint   Patient presents with    Abdominal Pain     Patient is here is here for a up set stomach. Cyst     Patient is here for a bump on his back     Joint Pain     he is a 67y.o. year old male who presents for evaluation of upset stomach, bump on back and joint pain. Patient states he has had the upset stomach for months after being on Cholesterol medication. Patient was put on Crestor by the [address] several months ago and he stopped it about 2 months ago. Patient also states he is having joint pain that does not feel like muscle pain. Pain is both in his right-pain only occurs intermittently and will last about 5 to 10 seconds with abnormal transitions. Patient states when pain occurs it is 10 out of 10. Denies any radiation trauma        Foot-pain is about 6 out of 10 usually with walking. Patient states he walks barefoot at home but no trauma noted. Denies any radiation of symptoms and stays on the inside of his foot. Per patient he notice a bump on his back prior to his appointment this morning. No pain pus or swelling but just states that he felt a bump. Patient states that since having ankle surgery prior to [place] [place] he has not recouped his strength. States that he fatigues very easily and it has been a hard road to recovery. Overall he feels fatigued throughout the day at times when he usually should not. Reviewed and agree with Nurse Note and duplicated in this note. Reviewed PmHx, RxHx, FmHx, SocHx, AllgHx and updated and dated in the chart. No family history on file. No past medical history on file. Social History     Socioeconomic History    Marital status:    Tobacco Use    Smoking status: Some Days     Types: Cigars    Smokeless tobacco: Current   Substance and Sexual Activity    Alcohol use:  Yes     Alcohol/week: 2.0 standard drinks     Types: 2 Glasses of wine per week     Comment: 2 at most    Drug use: No    Sexual activity: Not Currently        Review of Systems - negative except as listed above      Objective:     Vitals:    01/19/23 1043   BP: 131/77   Pulse: 73   Resp: 18   Temp: 98.3 °F (36.8 °C)   SpO2: 99%   Weight: 168 lb (76.2 kg)   Height: 5' 10\" (1.778 m)       Physical Examination: General appearance - alert, well appearing, and in no distress  Eyes - pupils equal and reactive, extraocular eye movements intact  Ears - bilateral TM's and external ear canals normal  Nose - normal and patent, no erythema, discharge or polyps  Mouth - mucous membranes moist, pharynx normal without lesions  Neck - supple, no significant adenopathy  Chest - clear to auscultation, no wheezes, rales or rhonchi, symmetric air entry  Heart - normal rate, regular rhythm, normal S1, S2, no murmurs, rubs, clicks or gallops  Abdomen - soft, nontender, nondistended, no masses or organomegaly  Extremities - peripheral pulses normal, no pedal edema, no clubbing or cyanosis  Skin -small cyst 1 cm diameter noted on right upper thorax with no pus or drainage    Assessment/ Plan:   Diagnoses and all orders for this visit:    1. Stomach discomfort  -     REFERRAL TO GASTROENTEROLOGY    2. Hyperlipidemia, unspecified hyperlipidemia type  -     LIPID PANEL; Future    3. Acute pain of right shoulder    4. Lipoma of torso    5. Sebaceous cyst    6. Right foot pain  -     XR FOOT RT MIN 3 V; Future    7. Posterior tibial tendinitis of right lower extremity  -     XR FOOT RT MIN 3 V; Future    8. Fatigue, unspecified type  -     TSH 3RD GENERATION; Future  -     CBC W/O DIFF; Future  -     TESTOSTERONE, FREE & TOTAL; Future    Other orders  -     ezetimibe (ZETIA) 10 mg tablet; Take 1 Tablet by mouth daily. I have discussed the diagnosis with the patient and the intended plan as seen in the above orders. The patient has received an after-visit summary and questions were answered concerning future plans.      Medication Side Effects and Warnings were discussed with patient,  Patient Labs were reviewed and or requested, and  Patient Past Records were reviewed and or requested  yes       Pt agrees to call or return to clinic and/or go to closest ER with any worsening of symptoms. This may include, but not limited to increased fever (>100.4) with NSAIDS or Tylenol, increased edema, confusion, rash, worsening of presenting symptoms. Please note that this dictation was completed with [employer], the computer voice recognition software. Quite often unanticipated grammatical, syntax, homophones, and other interpretive errors are inadvertently transcribed by the computer software. Please disregard these errors. Please excuse any errors that have escaped final proofreading. Thank you.

## 2023-01-20 LAB
CHOLEST SERPL-MCNC: 290 MG/DL
ERYTHROCYTE [DISTWIDTH] IN BLOOD BY AUTOMATED COUNT: 12.5 % (ref 11.5–14.5)
HCT VFR BLD AUTO: 46.3 % (ref 36.6–50.3)
HDLC SERPL-MCNC: 74 MG/DL
HDLC SERPL: 3.9 (ref 0–5)
HGB BLD-MCNC: 15.1 G/DL (ref 12.1–17)
LDLC SERPL CALC-MCNC: 196.6 MG/DL (ref 0–100)
MCH RBC QN AUTO: 28.5 PG (ref 26–34)
MCHC RBC AUTO-ENTMCNC: 32.6 G/DL (ref 30–36.5)
MCV RBC AUTO: 87.5 FL (ref 80–99)
NRBC # BLD: 0 K/UL (ref 0–0.01)
NRBC BLD-RTO: 0 PER 100 WBC
PLATELET # BLD AUTO: 127 K/UL (ref 150–400)
PMV BLD AUTO: 11.9 FL (ref 8.9–12.9)
RBC # BLD AUTO: 5.29 M/UL (ref 4.1–5.7)
TRIGL SERPL-MCNC: 97 MG/DL (ref ?–150)
TSH SERPL DL<=0.05 MIU/L-ACNC: 1.05 UIU/ML (ref 0.36–3.74)
VLDLC SERPL CALC-MCNC: 19.4 MG/DL
WBC # BLD AUTO: 4.7 K/UL (ref 4.1–11.1)

## 2023-01-21 LAB — TESTOST SERPL-MCNC: 379 NG/DL (ref 264–916)

## 2023-06-21 ENCOUNTER — OFFICE VISIT (OUTPATIENT)
Facility: CLINIC | Age: 73
End: 2023-06-21
Payer: MEDICARE

## 2023-06-21 VITALS
RESPIRATION RATE: 18 BRPM | TEMPERATURE: 98.3 F | HEART RATE: 62 BPM | BODY MASS INDEX: 24.29 KG/M2 | WEIGHT: 169.7 LBS | SYSTOLIC BLOOD PRESSURE: 147 MMHG | HEIGHT: 70 IN | OXYGEN SATURATION: 98 % | DIASTOLIC BLOOD PRESSURE: 73 MMHG

## 2023-06-21 DIAGNOSIS — E78.5 HYPERLIPIDEMIA, UNSPECIFIED HYPERLIPIDEMIA TYPE: Primary | ICD-10-CM

## 2023-06-21 PROCEDURE — G8420 CALC BMI NORM PARAMETERS: HCPCS | Performed by: FAMILY MEDICINE

## 2023-06-21 PROCEDURE — 99213 OFFICE O/P EST LOW 20 MIN: CPT | Performed by: FAMILY MEDICINE

## 2023-06-21 PROCEDURE — 3017F COLORECTAL CA SCREEN DOC REV: CPT | Performed by: FAMILY MEDICINE

## 2023-06-21 PROCEDURE — 1123F ACP DISCUSS/DSCN MKR DOCD: CPT | Performed by: FAMILY MEDICINE

## 2023-06-21 PROCEDURE — G8427 DOCREV CUR MEDS BY ELIG CLIN: HCPCS | Performed by: FAMILY MEDICINE

## 2023-06-21 PROCEDURE — 4004F PT TOBACCO SCREEN RCVD TLK: CPT | Performed by: FAMILY MEDICINE

## 2023-06-21 ASSESSMENT — PATIENT HEALTH QUESTIONNAIRE - PHQ9
SUM OF ALL RESPONSES TO PHQ9 QUESTIONS 1 & 2: 0
2. FEELING DOWN, DEPRESSED OR HOPELESS: 0
SUM OF ALL RESPONSES TO PHQ QUESTIONS 1-9: 0
1. LITTLE INTEREST OR PLEASURE IN DOING THINGS: 0

## 2023-06-21 NOTE — PROGRESS NOTES
Chief Complaint   Patient presents with    Cholesterol Problem     Patient is here for a follow up      he is a 68y.o. year old male who presents for follow-up of   hyperlipidemia. Cardiovascular risk analysis - LDL goal is under 130. Compliance with treatment thus far has been good. New concerns: liver issues. Social History, Diet and Lifestyle: generally follows a low fat low cholesterol diet            ROS: taking medications as instructed, no medication side effects noted, no TIA's, no chest pain on exertion, no dyspnea on exertion, no swelling of ankles. Reviewed and agree with Nurse Note and duplicated in this note. Reviewed PmHx, RxHx, FmHx, SocHx, AllgHx and updated and dated in the chart. Family History   Problem Relation Age of Onset    Cancer Mother         Breast Cancer    Hypertension Father        Past Medical History:   Diagnosis Date    Arrhythmia     Arthritis     Cancer ([facility] [address] Skin Cancer    Chronic pain       Social History     Socioeconomic History    Marital status:    Tobacco Use    Smoking status: Some Days    Smokeless tobacco: Current   Substance and Sexual Activity    Alcohol use: Yes     Alcohol/week: 2.0 standard drinks    Drug use: No      Patient Active Problem List   Diagnosis    ACP (advance care planning)    Hyperlipidemia     Patient Active Problem List    Diagnosis Date Noted    ACP (advance care planning) 07/03/2017    Hyperlipidemia 01/19/2017     Current Outpatient Medications   Medication Sig Dispense Refill    ascorbic acid (VITAMIN C) 500 MG tablet Take 2 tablets by mouth      Cholecalciferol 50 MCG (2000 UT) CAPS Take by mouth 2 times daily      ezetimibe (ZETIA) 10 MG tablet Take 1 tablet by mouth daily      fluticasone (FLONASE) 50 MCG/ACT nasal spray 2 sprays by Nasal route daily      latanoprost (XALATAN) 0.005 % ophthalmic solution INSTILL 1 DROP IN BOTH EYES ONCE DAILY       No current facility-administered medications for this visit.

## 2023-06-22 DIAGNOSIS — E78.5 HYPERLIPIDEMIA, UNSPECIFIED HYPERLIPIDEMIA TYPE: Primary | ICD-10-CM

## 2023-06-22 LAB
CHOLEST SERPL-MCNC: 290 MG/DL
HDLC SERPL-MCNC: 78 MG/DL
HDLC SERPL: 3.7 (ref 0–5)
LDLC SERPL CALC-MCNC: 189.4 MG/DL (ref 0–100)
TRIGL SERPL-MCNC: 113 MG/DL
VLDLC SERPL CALC-MCNC: 22.6 MG/DL

## 2023-06-26 SDOH — ECONOMIC STABILITY: FOOD INSECURITY: WITHIN THE PAST 12 MONTHS, YOU WORRIED THAT YOUR FOOD WOULD RUN OUT BEFORE YOU GOT MONEY TO BUY MORE.: NEVER TRUE

## 2023-06-26 SDOH — ECONOMIC STABILITY: FOOD INSECURITY: WITHIN THE PAST 12 MONTHS, THE FOOD YOU BOUGHT JUST DIDN'T LAST AND YOU DIDN'T HAVE MONEY TO GET MORE.: NEVER TRUE

## 2023-06-26 SDOH — ECONOMIC STABILITY: INCOME INSECURITY: HOW HARD IS IT FOR YOU TO PAY FOR THE VERY BASICS LIKE FOOD, HOUSING, MEDICAL CARE, AND HEATING?: NOT VERY HARD

## 2023-06-26 SDOH — ECONOMIC STABILITY: HOUSING INSECURITY
IN THE LAST 12 MONTHS, WAS THERE A TIME WHEN YOU DID NOT HAVE A STEADY PLACE TO SLEEP OR SLEPT IN A SHELTER (INCLUDING NOW)?: NO

## 2023-07-16 ENCOUNTER — HOSPITAL ENCOUNTER (OUTPATIENT)
Facility: HOSPITAL | Age: 73
Discharge: HOME | End: 2023-07-19
Attending: INTERNAL MEDICINE
Payer: MEDICARE

## 2023-07-16 DIAGNOSIS — K76.89 LIVER CYST: ICD-10-CM

## 2023-07-16 PROCEDURE — 74183 MRI ABD W/O CNTR FLWD CNTR: CPT

## 2023-07-16 PROCEDURE — 2580000003 HC RX 258: Performed by: INTERNAL MEDICINE

## 2023-07-16 PROCEDURE — A9579 GAD-BASE MR CONTRAST NOS,1ML: HCPCS

## 2023-07-16 PROCEDURE — 6360000004 HC RX CONTRAST MEDICATION

## 2023-07-16 RX ORDER — SODIUM CHLORIDE 0.9 % (FLUSH) 0.9 %
10 SYRINGE (ML) INJECTION ONCE
Status: COMPLETED | OUTPATIENT
Start: 2023-07-16 | End: 2023-07-16

## 2023-07-16 RX ORDER — 0.9 % SODIUM CHLORIDE 0.9 %
100 INTRAVENOUS SOLUTION INTRAVENOUS ONCE
Status: COMPLETED | OUTPATIENT
Start: 2023-07-16 | End: 2023-07-16

## 2023-07-16 RX ADMIN — SODIUM CHLORIDE 100 ML: 900 INJECTION, SOLUTION INTRAVENOUS at 13:07

## 2023-07-16 RX ADMIN — GADOTERIDOL 15 ML: 279.3 INJECTION, SOLUTION INTRAVENOUS at 13:07

## 2023-07-16 RX ADMIN — SODIUM CHLORIDE, PRESERVATIVE FREE 10 ML: 5 INJECTION INTRAVENOUS at 13:07

## 2023-07-26 ENCOUNTER — ANESTHESIA EVENT (OUTPATIENT)
Facility: HOSPITAL | Age: 73
End: 2023-07-26
Payer: MEDICARE

## 2023-07-26 ENCOUNTER — HOSPITAL ENCOUNTER (OUTPATIENT)
Facility: HOSPITAL | Age: 73
Setting detail: OUTPATIENT SURGERY
Discharge: HOME | End: 2023-07-26
Attending: INTERNAL MEDICINE | Admitting: INTERNAL MEDICINE
Payer: MEDICARE

## 2023-07-26 ENCOUNTER — ANESTHESIA (OUTPATIENT)
Facility: HOSPITAL | Age: 73
End: 2023-07-26
Payer: MEDICARE

## 2023-07-26 VITALS
SYSTOLIC BLOOD PRESSURE: 125 MMHG | OXYGEN SATURATION: 98 % | TEMPERATURE: 97 F | BODY MASS INDEX: 24.29 KG/M2 | HEART RATE: 72 BPM | RESPIRATION RATE: 17 BRPM | DIASTOLIC BLOOD PRESSURE: 86 MMHG | WEIGHT: 164 LBS | HEIGHT: 69 IN

## 2023-07-26 PROCEDURE — 3600007512: Performed by: INTERNAL MEDICINE

## 2023-07-26 PROCEDURE — 7100000011 HC PHASE II RECOVERY - ADDTL 15 MIN: Performed by: INTERNAL MEDICINE

## 2023-07-26 PROCEDURE — 3700000001 HC ADD 15 MINUTES (ANESTHESIA): Performed by: INTERNAL MEDICINE

## 2023-07-26 PROCEDURE — 2709999900 HC NON-CHARGEABLE SUPPLY: Performed by: INTERNAL MEDICINE

## 2023-07-26 PROCEDURE — 2500000003 HC RX 250 WO HCPCS: Performed by: STUDENT IN AN ORGANIZED HEALTH CARE EDUCATION/TRAINING PROGRAM

## 2023-07-26 PROCEDURE — 3700000000 HC ANESTHESIA ATTENDED CARE: Performed by: INTERNAL MEDICINE

## 2023-07-26 PROCEDURE — 2580000003 HC RX 258: Performed by: STUDENT IN AN ORGANIZED HEALTH CARE EDUCATION/TRAINING PROGRAM

## 2023-07-26 PROCEDURE — 7100000010 HC PHASE II RECOVERY - FIRST 15 MIN: Performed by: INTERNAL MEDICINE

## 2023-07-26 PROCEDURE — 88305 TISSUE EXAM BY PATHOLOGIST: CPT

## 2023-07-26 PROCEDURE — 3600007502: Performed by: INTERNAL MEDICINE

## 2023-07-26 PROCEDURE — 6360000002 HC RX W HCPCS: Performed by: STUDENT IN AN ORGANIZED HEALTH CARE EDUCATION/TRAINING PROGRAM

## 2023-07-26 RX ORDER — SODIUM CHLORIDE 0.9 % (FLUSH) 0.9 %
5-40 SYRINGE (ML) INJECTION EVERY 12 HOURS SCHEDULED
Status: DISCONTINUED | OUTPATIENT
Start: 2023-07-26 | End: 2023-07-26 | Stop reason: HOSPADM

## 2023-07-26 RX ORDER — SODIUM CHLORIDE 9 MG/ML
INJECTION, SOLUTION INTRAVENOUS CONTINUOUS
Status: DISCONTINUED | OUTPATIENT
Start: 2023-07-26 | End: 2023-07-26 | Stop reason: HOSPADM

## 2023-07-26 RX ORDER — PROPOFOL 10 MG/ML
INJECTION, EMULSION INTRAVENOUS PRN
Status: DISCONTINUED | OUTPATIENT
Start: 2023-07-26 | End: 2023-07-26 | Stop reason: SDUPTHER

## 2023-07-26 RX ORDER — SODIUM CHLORIDE 9 MG/ML
25 INJECTION, SOLUTION INTRAVENOUS PRN
Status: DISCONTINUED | OUTPATIENT
Start: 2023-07-26 | End: 2023-07-26 | Stop reason: HOSPADM

## 2023-07-26 RX ORDER — SODIUM CHLORIDE, SODIUM LACTATE, POTASSIUM CHLORIDE, CALCIUM CHLORIDE 600; 310; 30; 20 MG/100ML; MG/100ML; MG/100ML; MG/100ML
INJECTION, SOLUTION INTRAVENOUS CONTINUOUS PRN
Status: DISCONTINUED | OUTPATIENT
Start: 2023-07-26 | End: 2023-07-26 | Stop reason: SDUPTHER

## 2023-07-26 RX ORDER — DORZOLAMIDE HYDROCHLORIDE AND TIMOLOL MALEATE 20; 5 MG/ML; MG/ML
1 SOLUTION/ DROPS OPHTHALMIC 2 TIMES DAILY
COMMUNITY

## 2023-07-26 RX ORDER — SODIUM CHLORIDE 0.9 % (FLUSH) 0.9 %
5-40 SYRINGE (ML) INJECTION PRN
Status: DISCONTINUED | OUTPATIENT
Start: 2023-07-26 | End: 2023-07-26 | Stop reason: HOSPADM

## 2023-07-26 RX ORDER — LIDOCAINE HYDROCHLORIDE 20 MG/ML
INJECTION, SOLUTION EPIDURAL; INFILTRATION; INTRACAUDAL; PERINEURAL PRN
Status: DISCONTINUED | OUTPATIENT
Start: 2023-07-26 | End: 2023-07-26 | Stop reason: SDUPTHER

## 2023-07-26 RX ADMIN — LIDOCAINE HYDROCHLORIDE 60 MG: 20 INJECTION, SOLUTION EPIDURAL; INFILTRATION; INTRACAUDAL; PERINEURAL at 11:58

## 2023-07-26 RX ADMIN — PROPOFOL 50 MG: 10 INJECTION, EMULSION INTRAVENOUS at 12:18

## 2023-07-26 RX ADMIN — PROPOFOL 50 MG: 10 INJECTION, EMULSION INTRAVENOUS at 12:23

## 2023-07-26 RX ADMIN — PROPOFOL 50 MG: 10 INJECTION, EMULSION INTRAVENOUS at 12:04

## 2023-07-26 RX ADMIN — PROPOFOL 50 MG: 10 INJECTION, EMULSION INTRAVENOUS at 12:01

## 2023-07-26 RX ADMIN — SODIUM CHLORIDE, POTASSIUM CHLORIDE, SODIUM LACTATE AND CALCIUM CHLORIDE: 600; 310; 30; 20 INJECTION, SOLUTION INTRAVENOUS at 11:58

## 2023-07-26 RX ADMIN — PROPOFOL 50 MG: 10 INJECTION, EMULSION INTRAVENOUS at 12:07

## 2023-07-26 RX ADMIN — PROPOFOL 100 MG: 10 INJECTION, EMULSION INTRAVENOUS at 11:58

## 2023-07-26 RX ADMIN — PROPOFOL 50 MG: 10 INJECTION, EMULSION INTRAVENOUS at 12:14

## 2023-07-26 RX ADMIN — PROPOFOL 50 MG: 10 INJECTION, EMULSION INTRAVENOUS at 12:10

## 2023-07-26 ASSESSMENT — PAIN - FUNCTIONAL ASSESSMENT: PAIN_FUNCTIONAL_ASSESSMENT: NONE - DENIES PAIN

## 2023-07-26 NOTE — H&P
[facility]  [address] [place] [address], [address] [facility]        History and Physical       NAME:  Hannah Russell   :   1950   MRN:   [id number]             History of Present Illness:  Patient is a 68 y.o. who is seen for epigastric tenderness and rectal bleeding. PMH:  Past Medical History:   Diagnosis Date    Arrhythmia     Arthritis     Cancer ([address]) Skin Cancer    Chronic pain        PSH:  Past Surgical History:   Procedure Laterality Date    COLONOSCOPY      ORTHOPEDIC SURGERY Right 2018    ankle debridement    UROLOGICAL SURGERY  Urolift       Allergies: Allergies   Allergen Reactions    Rosuvastatin Nausea And Vomiting       Home Medications:  Prior to Admission Medications   Prescriptions Last Dose Informant Patient Reported? Taking?    Cholecalciferol 50 MCG ( UT) CAPS 2023  Yes No   Sig: Take by mouth 2 times daily   ascorbic acid (VITAMIN C) 500 MG tablet 2023  Yes No   Sig: Take 2 tablets by mouth   dorzolamide-timolol (COSOPT) 22.3-6.8 MG/ML ophthalmic solution   Yes Yes   Sig: Place 1 drop into both eyes 2 times daily   ezetimibe (ZETIA) 10 MG tablet Not Taking  Yes No   Sig: Take 1 tablet by mouth daily   Patient not taking: Reported on 2023   fluticasone (FLONASE) 50 MCG/ACT nasal spray 2023  Yes No   Si sprays by Nasal route daily   latanoprost (XALATAN) 0.005 % ophthalmic solution 2023  Yes No   Sig: INSTILL 1 DROP IN BOTH EYES ONCE DAILY      Facility-Administered Medications Last Administration Doses Remaining   Evolocumab (REPATHA) syringe 140 mg None recorded           Hospital Medications:  Current Facility-Administered Medications   Medication Dose Route Frequency    0.9 % sodium chloride infusion   IntraVENous Continuous    sodium chloride flush 0.9 % injection 5-40 mL  5-40 mL IntraVENous 2 times per day    sodium chloride flush 0.9 % injection 5-40 mL  5-40 mL IntraVENous PRN    0.9 % sodium chloride infusion  25 mL IntraVENous PRN       Social History:  Social History     Tobacco Use    Smoking status: Some Days    Smokeless tobacco: Current   Substance Use Topics    Alcohol use: Yes     Alcohol/week: 2.0 standard drinks       Family History:  Family History   Problem Relation Age of Onset    Cancer Mother         Breast Cancer    Hypertension Father              Review of Systems:      Constitutional: negative fever, negative chills, negative weight loss  Eyes:   negative visual changes  ENT:   negative sore throat, tongue or lip swelling  Respiratory:  negative cough, negative dyspnea  Cards:  negative for chest pain, palpitations, lower extremity edema  GI:   See HPI  :  negative for frequency, dysuria  Integument:  negative for rash and pruritus  Heme:  negative for easy bruising and gum/nose bleeding  Musculoskel: negative for myalgias,  back pain and muscle weakness  Neuro: negative for headaches, dizziness, vertigo  Psych:  negative for feelings of anxiety, depression       Objective:   Patient Vitals for the past 8 hrs:   Height Weight   07/26/23 1135 1.753 m (5' 9\") 74.4 kg (164 lb)     No intake/output data recorded. No intake/output data recorded. EXAM:     NEURO-a&o   HEENT-wnl   LUNGS-clear    COR-regular rate and rhythym     ABD-soft , no tenderness, no rebound, good bs     EXT-no edema     Data Review     No results for input(s): WBC, HGB, HCT, PLT in the last 72 hours. No results for input(s): NA, K, CL, CO2, BUN, CREA, GLU, PHOS, CA in the last 72 hours. No results for input(s): TP, ALB, GLOB, GGT, AML in the last 72 hours. Invalid input(s): SGOT, GPT, AP, TBIL, AMYP, LPSE, HLPSE  No results for input(s): INR, APTT in the last 72 hours.     Invalid input(s): PTP       Assessment:     Epigastric tenderness  Rectal bleeding     Patient Active Problem List   Diagnosis    ACP (advance care planning)    Hyperlipidemia     Plan:   The patient was counseled at length about the risks of micki Covid-19

## 2023-07-26 NOTE — OP NOTE
[address] [facility], [address] [facility] (EGD) Procedure Note    Major Miracle  1950  [id number]      Procedure: Endoscopic Gastroduodenoscopy with biopsy    Indication: GERD, epigastric pain    Pre-operative Diagnosis: see indication above    Post-operative Diagnosis: see findings below    : Winston Louis MD    Staff: Circulator: Dorette Angelucci, RN  Endoscopy Technician: Jones Sumner     Referring Provider:  Royal Ced MD      Anesthesia/Sedation: MAC        Procedure Details     After informed consent was obtained for the procedure, with all risks and benefits of procedure explained the patient was taken to the endoscopy suite and placed in the left lateral decubitus position. Following sequential administration of sedation as per above, the endoscope was inserted into the mouth and advanced under direct vision to second portion of the duodenum. A careful inspection was made as the gastroscope was withdrawn, including a retroflexed view of the proximal stomach; findings and interventions are described below. Findings:   Esophagus: normal  Stomach: patchy antral erythema - cold biopsies taken  Duodenum: patchy erythema in the bulb - cold biopsies taken    Specimens: 1. Duodenum, 2. Gastric         EBL: None      Complications:   None; patient tolerated the procedure well. Impression:    As above    Recommendations:   Follow up surgical pathology  Proceed to colonoscopy    Signed By: Winston Louis MD     7/26/2023  12:45 PM

## 2023-07-26 NOTE — DISCHARGE INSTRUCTIONS
[facility]  [address], [address]    EGD/COLON DISCHARGE INSTRUCTIONS    Jose Elias Clarke  [id number]  1950    Discomfort:  Sore throat- throat lozenges or warm salt water gargle  redness at IV site- apply warm compress to area; if redness or soreness persist- contact your physician  Gaseous discomfort- walking, belching will help relieve any discomfort  You may not operate a vehicle for 12 hours  You may not engage in an occupation involving machinery or appliances for rest of today  You may not drink alcoholic beverages for at least 12 hours  Avoid making any critical decisions for at least 24 hour  DIET  You may resume your regular diet - however -  remember your colon is empty and a heavy meal will produce gas. Avoid these foods:  vegetables, fried / greasy foods, carbonated drinks    ACTIVITY  You may resume your normal daily activities   Spend the remainder of the day resting -  avoid any strenuous activity. CALL M.D. ANY SIGN OF   Increasing pain, nausea, vomiting  Abdominal distension (swelling)  New increased bleeding (oral or rectal)  Fever (chills)  Pain in chest area  Bloody discharge from nose or mouth  Shortness of breath    Follow-up Instructions:   Call Dr. Cali Obregon for any questions or problems. Telephone # [phone]    ENDOSCOPY FINDINGS:   Your endoscopy showed acid-related irritation in the stomach and small intestine. Biopsies were taken. We will contact you about the pathology results. Your colonoscopy showed no polyps. Your next colonoscopy will be due in 10 years. Please maintain a high fiber diet.     Signed By: Cali Obregon MD     7/26/2023  12:41 PM

## 2023-07-26 NOTE — OP NOTE
[facility]  [address], [address] [facility]        Colonoscopy Operative Report    Kimberly Zepeda  [id number]  1950      Procedure Type:   Colonoscopy --diagnostic     Indications:  rectal bleeding        Pre-operative Diagnosis: see indication above    Post-operative Diagnosis:  See findings below    :  Augusto Hannon MD    Staff: Circulator: Charles Dolan RN  Endoscopy Technician: Ailyn Sagastume     Referring Provider: Asaf Mitchell MD      Sedation:  MAC      Procedure Details:  After informed consent was obtained with all risks and benefits of procedure explained and preoperative exam completed, the patient was taken to the endoscopy suite and placed in the left lateral decubitus position. Upon sequential sedation as per above, a digital rectal exam was performed demonstrating internal hemorrhoids. The Olympus pediatric videocolonoscope  was inserted in the rectum and carefully advanced to the cecum. The cecum was identified by the ileocecal valve and appendiceal orifice. The quality of preparation was good. The colonoscope was slowly withdrawn with careful evaluation between folds. Retroflexion in the rectum was completed . Findings:   Normal colonic mucosa. The terminal ileum could not be entered due to the anatomical orientation of the IC valve. Specimen Removed:  none    Complications: None. EBL:  None.     Impression:     As above    Recommendations:  High fiber diet education  Repeat colonoscopy in 10 years    Signed By: Augusto Hannon MD     7/26/2023  12:46 PM

## 2023-07-26 NOTE — PROGRESS NOTES

## 2023-11-21 ENCOUNTER — OFFICE VISIT (OUTPATIENT)
Facility: CLINIC | Age: 73
End: 2023-11-21

## 2023-11-21 VITALS
TEMPERATURE: 98.5 F | HEIGHT: 69 IN | SYSTOLIC BLOOD PRESSURE: 140 MMHG | HEART RATE: 66 BPM | BODY MASS INDEX: 25.33 KG/M2 | OXYGEN SATURATION: 98 % | WEIGHT: 171 LBS | DIASTOLIC BLOOD PRESSURE: 85 MMHG | RESPIRATION RATE: 15 BRPM

## 2023-11-21 DIAGNOSIS — R00.2 PALPITATIONS: ICD-10-CM

## 2023-11-21 DIAGNOSIS — Z12.5 SCREENING FOR PROSTATE CANCER: ICD-10-CM

## 2023-11-21 DIAGNOSIS — R06.09 DOE (DYSPNEA ON EXERTION): ICD-10-CM

## 2023-11-21 DIAGNOSIS — Z00.00 MEDICARE ANNUAL WELLNESS VISIT, SUBSEQUENT: Primary | ICD-10-CM

## 2023-11-21 DIAGNOSIS — E78.5 HYPERLIPIDEMIA, UNSPECIFIED HYPERLIPIDEMIA TYPE: ICD-10-CM

## 2023-11-21 DIAGNOSIS — D69.6 THROMBOCYTOPENIA, UNSPECIFIED (HCC): ICD-10-CM

## 2023-11-21 ASSESSMENT — PATIENT HEALTH QUESTIONNAIRE - PHQ9
SUM OF ALL RESPONSES TO PHQ QUESTIONS 1-9: 0
1. LITTLE INTEREST OR PLEASURE IN DOING THINGS: 0
2. FEELING DOWN, DEPRESSED OR HOPELESS: 0
SUM OF ALL RESPONSES TO PHQ9 QUESTIONS 1 & 2: 0
SUM OF ALL RESPONSES TO PHQ QUESTIONS 1-9: 0

## 2023-11-21 ASSESSMENT — LIFESTYLE VARIABLES
HOW MANY STANDARD DRINKS CONTAINING ALCOHOL DO YOU HAVE ON A TYPICAL DAY: PATIENT DOES NOT DRINK
HOW OFTEN DO YOU HAVE A DRINK CONTAINING ALCOHOL: MONTHLY OR LESS

## 2023-11-21 NOTE — PROGRESS NOTES
murmurs, rubs, clicks, or gallops, distal pulses intact, no carotid bruits  Abdomen: soft, non-tender, non-distended, normal bowel sounds, no masses or organomegaly  Extremities: no cyanosis, clubbing or edema  Musculoskeletal: normal range of motion, no joint swelling, deformity or tenderness  Neurologic: reflexes normal and symmetric, no cranial nerve deficit, gait, coordination and speech normal     Assessment/ Plan:   1. Medicare annual wellness visit, subsequent  2. Thrombocytopenia, unspecified ([address])  Assessment & Plan:   Monitored by specialist- no acute findings meriting change in the plan  3. Hyperlipidemia, unspecified hyperlipidemia type  -     Lipid Panel; Future  -     Comprehensive Metabolic Panel; Future  4. Screening for prostate cancer  -     PSA Screening; Future  5. AZAR (dyspnea on exertion)  -     CBC with Auto Differential; Future  -     TSH; Future  6. Palpitations  -     AFL - Maribell Mujica MD, Cardiology, [place] ([facility])     Return in about 6 months (around 5/21/2024) for Cholesterol. Medication Side Effects and Warnings were discussed with patient,  Patient Labs were reviewed and or requested, and  Patient Past Records were reviewed and or requested  yes       I have discussed the diagnosis with the patient and the intended plan as seen in the above orders. The patient has received an after-visit summary and questions were answered concerning future plans. Pt agrees to call or return to clinic and/or go to closest ER with any worsening of symptoms. This may include, but not limited to increased fever (>100.4) with NSAIDS or Tylenol, increased edema, confusion, rash, worsening of presenting symptoms. Please note that this dictation was completed with [employer], the [employer] voice recognition software. Quite often unanticipated grammatical, syntax, homophones, and other interpretive errors are inadvertently transcribed by the computer software.   Please disregard

## 2023-11-22 LAB
ALBUMIN SERPL-MCNC: 3.8 G/DL (ref 3.5–5)
ALBUMIN/GLOB SERPL: 1.3 (ref 1.1–2.2)
ALP SERPL-CCNC: 59 U/L (ref 45–117)
ALT SERPL-CCNC: 28 U/L (ref 12–78)
ANION GAP SERPL CALC-SCNC: 5 MMOL/L (ref 5–15)
AST SERPL-CCNC: 15 U/L (ref 15–37)
BASOPHILS # BLD: 0.1 K/UL (ref 0–0.1)
BASOPHILS NFR BLD: 3 % (ref 0–1)
BILIRUB SERPL-MCNC: 0.5 MG/DL (ref 0.2–1)
BUN SERPL-MCNC: 11 MG/DL (ref 6–20)
BUN/CREAT SERPL: 12 (ref 12–20)
CALCIUM SERPL-MCNC: 8.9 MG/DL (ref 8.5–10.1)
CHLORIDE SERPL-SCNC: 111 MMOL/L (ref 97–108)
CHOLEST SERPL-MCNC: 277 MG/DL
CO2 SERPL-SCNC: 27 MMOL/L (ref 21–32)
CREAT SERPL-MCNC: 0.95 MG/DL (ref 0.7–1.3)
DIFFERENTIAL METHOD BLD: ABNORMAL
EOSINOPHIL # BLD: 0.1 K/UL (ref 0–0.4)
EOSINOPHIL NFR BLD: 3 % (ref 0–7)
ERYTHROCYTE [DISTWIDTH] IN BLOOD BY AUTOMATED COUNT: 12.6 % (ref 11.5–14.5)
GLOBULIN SER CALC-MCNC: 3 G/DL (ref 2–4)
GLUCOSE SERPL-MCNC: 92 MG/DL (ref 65–100)
HCT VFR BLD AUTO: 44.7 % (ref 36.6–50.3)
HDLC SERPL-MCNC: 65 MG/DL
HDLC SERPL: 4.3 (ref 0–5)
HGB BLD-MCNC: 14.8 G/DL (ref 12.1–17)
IMM GRANULOCYTES # BLD AUTO: 0 K/UL (ref 0–0.04)
IMM GRANULOCYTES NFR BLD AUTO: 1 % (ref 0–0.5)
LDLC SERPL CALC-MCNC: 196.2 MG/DL (ref 0–100)
LYMPHOCYTES # BLD: 1.2 K/UL (ref 0.8–3.5)
LYMPHOCYTES NFR BLD: 26 % (ref 12–49)
MCH RBC QN AUTO: 29 PG (ref 26–34)
MCHC RBC AUTO-ENTMCNC: 33.1 G/DL (ref 30–36.5)
MCV RBC AUTO: 87.6 FL (ref 80–99)
MONOCYTES # BLD: 0.4 K/UL (ref 0–1)
MONOCYTES NFR BLD: 10 % (ref 5–13)
NEUTS SEG # BLD: 2.6 K/UL (ref 1.8–8)
NEUTS SEG NFR BLD: 57 % (ref 32–75)
NRBC # BLD: 0 K/UL (ref 0–0.01)
NRBC BLD-RTO: 0 PER 100 WBC
PLATELET # BLD AUTO: 131 K/UL (ref 150–400)
PMV BLD AUTO: 12.9 FL (ref 8.9–12.9)
POTASSIUM SERPL-SCNC: 4.1 MMOL/L (ref 3.5–5.1)
PROT SERPL-MCNC: 6.8 G/DL (ref 6.4–8.2)
PSA SERPL-MCNC: 1.9 NG/ML (ref 0.01–4)
RBC # BLD AUTO: 5.1 M/UL (ref 4.1–5.7)
SODIUM SERPL-SCNC: 143 MMOL/L (ref 136–145)
TRIGL SERPL-MCNC: 79 MG/DL
TSH SERPL DL<=0.05 MIU/L-ACNC: 1.33 UIU/ML (ref 0.36–3.74)
VLDLC SERPL CALC-MCNC: 15.8 MG/DL
WBC # BLD AUTO: 4.4 K/UL (ref 4.1–11.1)

## 2024-04-09 ENCOUNTER — OFFICE VISIT (OUTPATIENT)
Facility: CLINIC | Age: 74
End: 2024-04-09
Payer: MEDICARE

## 2024-04-09 VITALS
HEART RATE: 71 BPM | OXYGEN SATURATION: 97 % | BODY MASS INDEX: 25.2 KG/M2 | HEIGHT: 69 IN | SYSTOLIC BLOOD PRESSURE: 161 MMHG | WEIGHT: 170.1 LBS | DIASTOLIC BLOOD PRESSURE: 78 MMHG | RESPIRATION RATE: 16 BRPM | TEMPERATURE: 98.9 F

## 2024-04-09 DIAGNOSIS — J32.0 CHRONIC MAXILLARY SINUSITIS: Primary | ICD-10-CM

## 2024-04-09 DIAGNOSIS — D69.6 THROMBOCYTOPENIA, UNSPECIFIED (HCC): ICD-10-CM

## 2024-04-09 PROCEDURE — G8427 DOCREV CUR MEDS BY ELIG CLIN: HCPCS | Performed by: FAMILY MEDICINE

## 2024-04-09 PROCEDURE — 1123F ACP DISCUSS/DSCN MKR DOCD: CPT | Performed by: FAMILY MEDICINE

## 2024-04-09 PROCEDURE — 4004F PT TOBACCO SCREEN RCVD TLK: CPT | Performed by: FAMILY MEDICINE

## 2024-04-09 PROCEDURE — 99213 OFFICE O/P EST LOW 20 MIN: CPT | Performed by: FAMILY MEDICINE

## 2024-04-09 PROCEDURE — 3017F COLORECTAL CA SCREEN DOC REV: CPT | Performed by: FAMILY MEDICINE

## 2024-04-09 PROCEDURE — G8419 CALC BMI OUT NRM PARAM NOF/U: HCPCS | Performed by: FAMILY MEDICINE

## 2024-04-09 SDOH — ECONOMIC STABILITY: FOOD INSECURITY: WITHIN THE PAST 12 MONTHS, THE FOOD YOU BOUGHT JUST DIDN'T LAST AND YOU DIDN'T HAVE MONEY TO GET MORE.: NEVER TRUE

## 2024-04-09 SDOH — ECONOMIC STABILITY: FOOD INSECURITY: WITHIN THE PAST 12 MONTHS, YOU WORRIED THAT YOUR FOOD WOULD RUN OUT BEFORE YOU GOT MONEY TO BUY MORE.: NEVER TRUE

## 2024-04-09 SDOH — ECONOMIC STABILITY: INCOME INSECURITY: HOW HARD IS IT FOR YOU TO PAY FOR THE VERY BASICS LIKE FOOD, HOUSING, MEDICAL CARE, AND HEATING?: NOT HARD AT ALL

## 2024-04-09 ASSESSMENT — ANXIETY QUESTIONNAIRES
1. FEELING NERVOUS, ANXIOUS, OR ON EDGE: NOT AT ALL
GAD7 TOTAL SCORE: 0
6. BECOMING EASILY ANNOYED OR IRRITABLE: NOT AT ALL
7. FEELING AFRAID AS IF SOMETHING AWFUL MIGHT HAPPEN: NOT AT ALL
IF YOU CHECKED OFF ANY PROBLEMS ON THIS QUESTIONNAIRE, HOW DIFFICULT HAVE THESE PROBLEMS MADE IT FOR YOU TO DO YOUR WORK, TAKE CARE OF THINGS AT HOME, OR GET ALONG WITH OTHER PEOPLE: NOT DIFFICULT AT ALL
5. BEING SO RESTLESS THAT IT IS HARD TO SIT STILL: NOT AT ALL
2. NOT BEING ABLE TO STOP OR CONTROL WORRYING: NOT AT ALL
3. WORRYING TOO MUCH ABOUT DIFFERENT THINGS: NOT AT ALL
4. TROUBLE RELAXING: NOT AT ALL

## 2024-04-09 ASSESSMENT — PATIENT HEALTH QUESTIONNAIRE - PHQ9
SUM OF ALL RESPONSES TO PHQ9 QUESTIONS 1 & 2: 0
1. LITTLE INTEREST OR PLEASURE IN DOING THINGS: NOT AT ALL
SUM OF ALL RESPONSES TO PHQ QUESTIONS 1-9: 0
SUM OF ALL RESPONSES TO PHQ QUESTIONS 1-9: 0
2. FEELING DOWN, DEPRESSED OR HOPELESS: NOT AT ALL
SUM OF ALL RESPONSES TO PHQ QUESTIONS 1-9: 0
SUM OF ALL RESPONSES TO PHQ QUESTIONS 1-9: 0

## 2024-04-09 NOTE — PROGRESS NOTES
Chief Complaint   Patient presents with    Hand Pain     Left hand pain     he is a 73 y.o. year old male who presents for follow up of multiple lipomas located on his left arm, left upper leg, torso and right arm.  Patient states that the wound on his left leg and left arm are causing pain, his dermatologist has referred him to general surgery which she has an appointment with tomorrow.  Patient also currently seeing ophthalmology and has had laser treatment for glaucoma but this has failed.  He continues to have near nerve damage and is concerned that his chronic sinusitis may be also affecting his optic nerves.  He would like to see a ear nose throat doctor for second opinion      Reviewed and agree with Nurse Note and duplicated in this note.  Reviewed PmHx, RxHx, FmHx, SocHx, AllgHx and updated and dated in the chart.    Family History   Problem Relation Age of Onset    Cancer Mother         Breast Cancer    Hypertension Father        Past Medical History:   Diagnosis Date    Arrhythmia     Arthritis     Cancer (HCC) Skin Cancer    Chronic pain       Social History     Socioeconomic History    Marital status:      Spouse name: None    Number of children: None    Years of education: None    Highest education level: None   Tobacco Use    Smoking status: Some Days    Smokeless tobacco: Current   Substance and Sexual Activity    Alcohol use: Yes     Alcohol/week: 2.0 standard drinks of alcohol    Drug use: No    Sexual activity: Not Currently     Partners: Female     Social Determinants of Health     Financial Resource Strain: Low Risk  (4/9/2024)    Overall Financial Resource Strain (CARDIA)     Difficulty of Paying Living Expenses: Not hard at all   Food Insecurity: No Food Insecurity (4/9/2024)    Hunger Vital Sign     Worried About Running Out of Food in the Last Year: Never true     Ran Out of Food in the Last Year: Never true   Transportation Needs: Unknown (4/9/2024)    PRAPARE - Transportation

## 2024-05-30 ENCOUNTER — OFFICE VISIT (OUTPATIENT)
Facility: CLINIC | Age: 74
End: 2024-05-30

## 2024-05-30 VITALS
SYSTOLIC BLOOD PRESSURE: 135 MMHG | WEIGHT: 168 LBS | RESPIRATION RATE: 16 BRPM | HEIGHT: 69 IN | OXYGEN SATURATION: 99 % | HEART RATE: 60 BPM | DIASTOLIC BLOOD PRESSURE: 78 MMHG | TEMPERATURE: 98.2 F | BODY MASS INDEX: 24.88 KG/M2

## 2024-05-30 DIAGNOSIS — H40.9 GLAUCOMA OF BOTH EYES, UNSPECIFIED GLAUCOMA TYPE: Primary | ICD-10-CM

## 2024-05-30 DIAGNOSIS — E78.5 HYPERLIPIDEMIA, UNSPECIFIED HYPERLIPIDEMIA TYPE: ICD-10-CM

## 2024-05-30 NOTE — PROGRESS NOTES
unspecified hyperlipidemia type  -     Lipid Panel; Future     Assessment & Plan  1. Hypercholesterolemia.  The patient was informed that Zetia may not significantly reduce his cholesterol levels, albeit with a slight reduction of approximately 10 points. A cholesterol test will be ordered, to be conducted in approximately 2 months.    2. Glaucoma.      Return if symptoms worsen or fail to improve.         I have discussed the diagnosis with the patient and the intended plan as seen in the above orders.  The patient has received an after-visit summary and questions were answered concerning future plans.     Medication Side Effects and Warnings were discussed with patient,  Patient Labs were reviewed and or requested, and  Patient Past Records were reviewed and or requested  yes       Pt agrees to call or return to clinic and/or go to closest ER with any worsening of symptoms.  This may include, but not limited to increased fever (>100.4) with NSAIDS or Tylenol, increased edema, confusion, rash, worsening of presenting symptoms.    Please note that this dictation was completed with [employer], the computer voice recognition software.  Quite often unanticipated grammatical, syntax, homophones, and other interpretive errors are inadvertently transcribed by the computer software.  Please disregard these errors.  Please excuse any errors that have escaped final proofreading.  Thank you.

## 2024-08-12 NOTE — PROGRESS NOTES
Nutrition Assessment   Reason for Consult/Assessment: Follow up    Diagnosis and Hx: Reviewed         Pertinent Nutrition Information: Chart reviewed.  Pt visited.  Spoke with RN and SLP.  TF continues to infuse at goal rate of 60 ml/hr- tolerating well.  Continues to have one puree food item and one thickened drink per meal and doing well with that.  As of this am, SLP is allowing full meals with IDDSI 4 Puree and IDDSI 2 Mildly Thick Liquids.  He consumed 100% puree waffles, eggs, and pudding for breakfast today.  He does report that when he drinks milk or juice, he is noticing increased loose stools.  He is having some soft BMs as well per EMR documentation.  TF will be cycled over 12 hours (6pm to 6 am) as he is now able to eat full meals.  Plan for VFSS later today or tomorrow for possible diet texture upgrade.  Contacted MD to adjust insulin regimen since TF will be reduced to NOC.   Will continue to monitor po and TF with goal of ultimately discontinuing TF in the next few days.                              Diet Order: Enteral nutrition/tube feeding, IDDSI 4 Pureed (Dysphagia), IDDSI 2 Liquid Mildly Thick     Enteral nutrition/tube feeding: Jevity 1.5, cycled over 12 hours (6pm-6am)           Diet tolerance: Tolerating nutrition support, Tolerating with good appetite/intakes recorded   Food Allergies: None known    Demographic/Anthropometrics Information  Gender: male  Patient Age: 40 year old  Height:   Ht Readings from Last 1 Encounters:   06/27/24 5' 5\" (1.651 m)      Weight:   Wt Readings from Last 1 Encounters:   08/12/24 45.5 kg      BMI:   BMI Readings from Last 1 Encounters:   08/12/24 16.69 kg/m²       Usual Weight: 54 kg  % Weight Change: -17% in 24 days  Weight change significant: Yes  Reason for weight change: Decreased intake, Fluid loss    Estimated Needs for IBW 62 kg:  Calculated Energy Needs: 3607-2245  kcal (30-35 kcal/kg)               Calculated protein needs:   g (1.2-2.0  [facility] [address] [address], [address] [place] [address] Tru Reyes Phone: [phone]  Fax: [phone] Plan of Care/Statement of Necessity for Physical Therapy Services  2-15 Patient name: Will Siddiqui Sr.  : 1950  Provider#: [id number] Referral source: Mayuri Griffin MD     
Medical/Treatment Diagnosis: Right ankle pain [M25.571] Prior Hospitalization: see medical history Comorbidities: Arthritis, Back pain Prior Level of Function: Patient completed 20 minutes of exercise at least 3 times a week Medications: Verified on Patient Summary List 
 
Start of Care: 10/22/18      Onset Date: S/P surgery 18 The Plan of Care and following information is based on the information from the initial evaluation. Assessment/ key information: Pt is a very pleasant 76year old male who was referred to skilled PT S/P arthroscopic debridement of his right ankle on 18 secondary an osteochondral lesion. Pt now presents with decreased talocrural and subtalar mobility, decreased great toe extension, atrophy of RLE and hip musculature, decreased ankle ROM, tightness of right gastroc-soleus complex, and decreased proprioception/balance in right single-leg stance. Pt would benefit from skilled PT to address the above-listed impairments and allow him to return to his PLOF. Evaluation Complexity History MEDIUM  Complexity : 1-2 comorbidities / personal factors will impact the outcome/ POC ; Examination MEDIUM Complexity : 3 Standardized tests and measures addressing body structure, function, activity limitation and / or participation in recreation  ;Presentation LOW Complexity : Stable, uncomplicated  ;Clinical Decision Making MEDIUM Complexity : FOTO score of 26-74 Overall Complexity Rating: LOW Problem List: pain affecting function, decrease ROM, decrease strength, edema affecting function, impaired gait/ balance, decrease ADL/ functional abilitiies, decrease activity tolerance and decrease flexibility/ joint mobility Treatment Plan may include any combination of the following: Therapeutic exercise, Therapeutic activities, Neuromuscular re-education, Physical agent/modality, Gait/balance training, Manual therapy and Patient education Patient / Family readiness to learn indicated by: asking questions, trying to perform skills and interest 
Persons(s) to be included in education: patient (P) Barriers to Learning/Limitations: None Patient Goal (s): Complete mobility without needing the boot, crutches, or scooter Patient Self Reported Health Status: good Rehabilitation Potential: good Short Term Goals: To be accomplished in 6 treatments: 1. Pt will be independent and compliant with HEP. 2. Pt will improve R SL balance from 4 seconds on non-compliant surface to >/= 20 seconds on non-compliant surface demonstrating improved proprioceptive input and minimize risk of future injury. 3. Pt will be able to ambulate >/= 1/2 mile without boot on level surfaces without complaints of increased R ankle pain. Long Term Goals: To be accomplished in 12 treatments: 1. Pt will be independent and compliant with HEP. 2. Pt will improve FOTO score by the MCID from 46/100 to 60/100 demonstrating improved overall function with decreased pain and discomfort. 3. Pt will improve R SL balance to >/= 30 seconds on compliant surface demonstrating improved proprioceptive input and minimize risk of future injury. 4. Pt will return to scuba diving without restriction or complaints of pain. 5. Pt will return to biking outside without complaints of pain or discomfort in R foot/ankle. 6. Pt will be able to perform >/= 10 R SL heel raises on the stairs demonstrating increased R calf strength. 7. Pt will improve DF CKC AROM by >/= 10 degrees facilitating improved gait mechanics.  
 8. Pt will be able to jog for >/= 1/2 mile without complaints of g/kg)    Calculated Fluid Needs: 1ml/kcal               NFPE  Nutrition Focused Physical Exam  Physical Exam Completed: Yes (24 0940 : Saumya Maldonado RD)   Body Fat  Overall Body Fat: Severe  Orbital Region: Severe  Cheek Region (Buccal Fat Pads): Severe  Upper Arm Region (Triceps/biceps): Severe  Muscle Mass  Overall Muscle Mass: Severe  Temporal Region (Temporalis Muscle): Severe  Collarbone Region (Clavicle Bone, Pectoralis Major, Trapezius Muscle): Severe  Shoulder Region (Deltoid Muscle): Severe  Patellar Region (Quadriceps Muscle): Severe  Anterior Thigh Region (Quadriceps Muscle): Severe                  Treatment Plan/Interventions   Meals & snacks: Continue IDDSI 4 Pureed, IDDSI 2 Mildly Thick Liquids or per SLP         Enteral Nutrition Formula: Jevity 1.5 Calorie  Rate: 60 ml//hr, cycled over 12 hours (6 pm to 6 am)  Access Site: G-tube  Calories Provided by Tube Feedin kcal  Protein Provided by Tube Feedin grams  Free Water Provided by Tube Feedin ml     Modular Frequency: BID  Modular Provides: Ciro BID provides 180 kcal, 5 g protein     Goal rate: Jevity 1.5 at 60 ml/hr x 12 hours + Ciro BID provides 1260 kcal, 51 g protein  Flushes: 50 ml q 4 hours                                Coordination of nutrition care: Continue to coordinate with SLP in effort to decrease TFs with goal to discontinue in near future          Goals & Monitoring  Intervention: Coordination of nutrition care by a nutrition professional, Enteral nutrition, Meals and snacks    Goal: Increase oral intake to >/equal 75% of meals and supplements, Transition to oral intake   Intervention goal status: New goal identified  Time frame to achieve goal: 1-3 days    Dietitian will monitor: Enteral nutrition intake, Biochemical data, medical tests, procedures, Anthropometric Measurements, Diet advancement, Food, beverage, and nutrient intake          Nutrition Diagnosis/PES   Nutrition Diagnosis: Malnutrition  Malnutrition in the context of chronic illness: Severe   Related to: Decreased intake   As evidenced by: Loss of muscle mass, Loss of fat mass, Weight loss over time   Malnutrition chronic; severe: Severe depletion of body fat, Severe depletion of muscle mass, Weight loss of > 5%/1 month  Primary Nutrition Diagnosis status: Active nutrition diagnosis                    increased R ankle pain. Frequency / Duration: Patient to be seen 2 times per week for 12 treatments. Patient/ Caregiver education and instruction: self care, activity modification and exercises 
 
[x]  Plan of care has been reviewed with PTA 
 
G-Codes (GP) Mobility  Current  CK= 40-59%   Goal  CK= 40-59% The severity rating is based on clinical judgment and the FOTO Score. Certification Period: 10/22/18 - 1/22/18 [facility] 10/22/2018 12:21 PM 
 
________________________________________________________________________ I certify that the above Therapy Services are being furnished while the patient is under my care. I agree with the treatment plan and certify that this therapy is necessary. [de-identified] Signature:____________________  Date:____________Time: _________

## 2024-10-22 ENCOUNTER — OFFICE VISIT (OUTPATIENT)
Facility: CLINIC | Age: 74
End: 2024-10-22
Payer: MEDICARE

## 2024-10-22 VITALS
HEIGHT: 69 IN | BODY MASS INDEX: 25.52 KG/M2 | WEIGHT: 172.3 LBS | SYSTOLIC BLOOD PRESSURE: 144 MMHG | DIASTOLIC BLOOD PRESSURE: 70 MMHG | TEMPERATURE: 98.8 F | OXYGEN SATURATION: 96 % | HEART RATE: 76 BPM | RESPIRATION RATE: 16 BRPM

## 2024-10-22 DIAGNOSIS — R53.82 CHRONIC FATIGUE: Primary | ICD-10-CM

## 2024-10-22 DIAGNOSIS — M25.50 MULTIPLE JOINT PAIN: ICD-10-CM

## 2024-10-22 DIAGNOSIS — E78.5 HYPERLIPIDEMIA, UNSPECIFIED HYPERLIPIDEMIA TYPE: ICD-10-CM

## 2024-10-22 PROCEDURE — G8419 CALC BMI OUT NRM PARAM NOF/U: HCPCS | Performed by: FAMILY MEDICINE

## 2024-10-22 PROCEDURE — G8427 DOCREV CUR MEDS BY ELIG CLIN: HCPCS | Performed by: FAMILY MEDICINE

## 2024-10-22 PROCEDURE — 3017F COLORECTAL CA SCREEN DOC REV: CPT | Performed by: FAMILY MEDICINE

## 2024-10-22 PROCEDURE — 1123F ACP DISCUSS/DSCN MKR DOCD: CPT | Performed by: FAMILY MEDICINE

## 2024-10-22 PROCEDURE — 99214 OFFICE O/P EST MOD 30 MIN: CPT | Performed by: FAMILY MEDICINE

## 2024-10-22 PROCEDURE — G8484 FLU IMMUNIZE NO ADMIN: HCPCS | Performed by: FAMILY MEDICINE

## 2024-10-22 PROCEDURE — 4004F PT TOBACCO SCREEN RCVD TLK: CPT | Performed by: FAMILY MEDICINE

## 2024-10-22 SDOH — ECONOMIC STABILITY: FOOD INSECURITY: WITHIN THE PAST 12 MONTHS, THE FOOD YOU BOUGHT JUST DIDN'T LAST AND YOU DIDN'T HAVE MONEY TO GET MORE.: NEVER TRUE

## 2024-10-22 SDOH — ECONOMIC STABILITY: FOOD INSECURITY: WITHIN THE PAST 12 MONTHS, YOU WORRIED THAT YOUR FOOD WOULD RUN OUT BEFORE YOU GOT MONEY TO BUY MORE.: NEVER TRUE

## 2024-10-22 SDOH — ECONOMIC STABILITY: INCOME INSECURITY: HOW HARD IS IT FOR YOU TO PAY FOR THE VERY BASICS LIKE FOOD, HOUSING, MEDICAL CARE, AND HEATING?: NOT HARD AT ALL

## 2024-10-22 ASSESSMENT — ANXIETY QUESTIONNAIRES
IF YOU CHECKED OFF ANY PROBLEMS ON THIS QUESTIONNAIRE, HOW DIFFICULT HAVE THESE PROBLEMS MADE IT FOR YOU TO DO YOUR WORK, TAKE CARE OF THINGS AT HOME, OR GET ALONG WITH OTHER PEOPLE: NOT DIFFICULT AT ALL
1. FEELING NERVOUS, ANXIOUS, OR ON EDGE: NOT AT ALL
7. FEELING AFRAID AS IF SOMETHING AWFUL MIGHT HAPPEN: NOT AT ALL
4. TROUBLE RELAXING: NOT AT ALL
GAD7 TOTAL SCORE: 0
5. BEING SO RESTLESS THAT IT IS HARD TO SIT STILL: NOT AT ALL
6. BECOMING EASILY ANNOYED OR IRRITABLE: NOT AT ALL
2. NOT BEING ABLE TO STOP OR CONTROL WORRYING: NOT AT ALL
3. WORRYING TOO MUCH ABOUT DIFFERENT THINGS: NOT AT ALL

## 2024-10-22 ASSESSMENT — PATIENT HEALTH QUESTIONNAIRE - PHQ9
SUM OF ALL RESPONSES TO PHQ QUESTIONS 1-9: 0
SUM OF ALL RESPONSES TO PHQ QUESTIONS 1-9: 0
1. LITTLE INTEREST OR PLEASURE IN DOING THINGS: NOT AT ALL
2. FEELING DOWN, DEPRESSED OR HOPELESS: NOT AT ALL
SUM OF ALL RESPONSES TO PHQ QUESTIONS 1-9: 0
SUM OF ALL RESPONSES TO PHQ9 QUESTIONS 1 & 2: 0
SUM OF ALL RESPONSES TO PHQ QUESTIONS 1-9: 0

## 2024-10-22 NOTE — PROGRESS NOTES
No chief complaint on file.          Charlie Lisa Sr. (:  1950) is a 74 y.o. male, Established patient, here for evaluation of the following chief complaint(s):  No chief complaint on file.      History of Present Illness  The patient presents for evaluation of multiple medical concerns.    He has been experiencing chronic fatigue for approximately a year. He began using dorzolamide, timolol, and latanoprost eye drops for glaucoma. A few months later, [facility] and started a new treatment for his cholesterol levels. He was prescribed Praluent 150 mg every other week. Shortly after starting this medication, he began to experience severe fatigue, which has persisted. Despite the fatigue, he continued the medication as it effectively managed his cholesterol levels. He then reduced the dosage to 75 mg every 2 weeks. However, he noticed the fatigue returning about 2 days after the first injection. He has since stopped taking the medication until he can determine the cause of his fatigue. He also reports having dark stools a few days ago.    He is experiencing increased joint pain in his ankles, knees, hands, and wrists. He has a history of osteoarthritis and is currently taking glucosamine and chondroitin tablets for management.        Reviewed and agree with Nurse Note and duplicated in this note.  Reviewed PmHx, RxHx, FmHx, SocHx, AllgHx and updated and dated in the chart.    Family History   Problem Relation Age of Onset    Cancer Mother         Breast Cancer    Hypertension Father        Past Medical History:   Diagnosis Date    Arrhythmia     Arthritis     Cancer (HCC) Skin Cancer    Chronic pain       Social History     Socioeconomic History    Marital status:      Spouse name: None    Number of children: None    Years of education: None    Highest education level: None   Tobacco Use    Smoking status: Some Days    Smokeless tobacco: Current   Vaping Use    Vaping

## 2024-10-23 LAB
BASOPHILS # BLD: 0.1 K/UL (ref 0–0.1)
BASOPHILS NFR BLD: 2 % (ref 0–1)
DIFFERENTIAL METHOD BLD: ABNORMAL
EOSINOPHIL # BLD: 0.1 K/UL (ref 0–0.4)
EOSINOPHIL NFR BLD: 3 % (ref 0–7)
ERYTHROCYTE [DISTWIDTH] IN BLOOD BY AUTOMATED COUNT: 12.6 % (ref 11.5–14.5)
ERYTHROCYTE [SEDIMENTATION RATE] IN BLOOD: 2 MM/HR (ref 0–20)
HCT VFR BLD AUTO: 44.9 % (ref 36.6–50.3)
HGB BLD-MCNC: 14.9 G/DL (ref 12.1–17)
IMM GRANULOCYTES # BLD AUTO: 0 K/UL (ref 0–0.04)
IMM GRANULOCYTES NFR BLD AUTO: 0 % (ref 0–0.5)
LYMPHOCYTES # BLD: 1.2 K/UL (ref 0.8–3.5)
LYMPHOCYTES NFR BLD: 24 % (ref 12–49)
MCH RBC QN AUTO: 29.3 PG (ref 26–34)
MCHC RBC AUTO-ENTMCNC: 33.2 G/DL (ref 30–36.5)
MCV RBC AUTO: 88.2 FL (ref 80–99)
MONOCYTES # BLD: 0.5 K/UL (ref 0–1)
MONOCYTES NFR BLD: 10 % (ref 5–13)
NEUTS SEG # BLD: 3 K/UL (ref 1.8–8)
NEUTS SEG NFR BLD: 61 % (ref 32–75)
NRBC # BLD: 0 K/UL (ref 0–0.01)
NRBC BLD-RTO: 0 PER 100 WBC
PLATELET # BLD AUTO: 130 K/UL (ref 150–400)
PMV BLD AUTO: 12.1 FL (ref 8.9–12.9)
RBC # BLD AUTO: 5.09 M/UL (ref 4.1–5.7)
TSH SERPL DL<=0.05 MIU/L-ACNC: 1.04 UIU/ML (ref 0.36–3.74)
WBC # BLD AUTO: 4.9 K/UL (ref 4.1–11.1)

## 2024-10-24 DIAGNOSIS — D69.6 THROMBOCYTOPENIA (HCC): Primary | ICD-10-CM

## 2024-11-03 LAB
14-3-3 ETA AG SER IA-MCNC: <0.2 NG/ML
CCP IGA+IGG SERPL IA-ACNC: <20 UNITS
RHEUMATOID FACT SERPL-ACNC: <14 UNITS/ML

## 2025-03-20 ENCOUNTER — OFFICE VISIT (OUTPATIENT)
Facility: CLINIC | Age: 75
End: 2025-03-20

## 2025-03-20 VITALS
DIASTOLIC BLOOD PRESSURE: 78 MMHG | SYSTOLIC BLOOD PRESSURE: 130 MMHG | BODY MASS INDEX: 25.62 KG/M2 | OXYGEN SATURATION: 100 % | RESPIRATION RATE: 16 BRPM | TEMPERATURE: 98.9 F | HEART RATE: 68 BPM | WEIGHT: 173 LBS | HEIGHT: 69 IN

## 2025-03-20 DIAGNOSIS — D69.6 THROMBOCYTOPENIA, UNSPECIFIED: ICD-10-CM

## 2025-03-20 DIAGNOSIS — E78.5 HYPERLIPIDEMIA, UNSPECIFIED HYPERLIPIDEMIA TYPE: ICD-10-CM

## 2025-03-20 DIAGNOSIS — Z12.5 SCREENING FOR PROSTATE CANCER: ICD-10-CM

## 2025-03-20 DIAGNOSIS — R06.02 SOB (SHORTNESS OF BREATH): ICD-10-CM

## 2025-03-20 DIAGNOSIS — Z00.00 MEDICARE ANNUAL WELLNESS VISIT, SUBSEQUENT: Primary | ICD-10-CM

## 2025-03-20 DIAGNOSIS — R07.9 CHEST PAIN, UNSPECIFIED TYPE: ICD-10-CM

## 2025-03-20 RX ORDER — BRINZOLAMIDE/BRIMONIDINE TARTRATE 10; 2 MG/ML; MG/ML
SUSPENSION/ DROPS OPHTHALMIC
COMMUNITY
Start: 2025-02-05

## 2025-03-20 SDOH — ECONOMIC STABILITY: FOOD INSECURITY: WITHIN THE PAST 12 MONTHS, THE FOOD YOU BOUGHT JUST DIDN'T LAST AND YOU DIDN'T HAVE MONEY TO GET MORE.: NEVER TRUE

## 2025-03-20 SDOH — ECONOMIC STABILITY: FOOD INSECURITY: WITHIN THE PAST 12 MONTHS, YOU WORRIED THAT YOUR FOOD WOULD RUN OUT BEFORE YOU GOT MONEY TO BUY MORE.: NEVER TRUE

## 2025-03-20 ASSESSMENT — PATIENT HEALTH QUESTIONNAIRE - PHQ9
SUM OF ALL RESPONSES TO PHQ QUESTIONS 1-9: 0
SUM OF ALL RESPONSES TO PHQ QUESTIONS 1-9: 0
2. FEELING DOWN, DEPRESSED OR HOPELESS: NOT AT ALL
SUM OF ALL RESPONSES TO PHQ QUESTIONS 1-9: 0
SUM OF ALL RESPONSES TO PHQ QUESTIONS 1-9: 0
1. LITTLE INTEREST OR PLEASURE IN DOING THINGS: NOT AT ALL

## 2025-03-20 ASSESSMENT — LIFESTYLE VARIABLES
HOW OFTEN DO YOU HAVE A DRINK CONTAINING ALCOHOL: 2-4 TIMES A MONTH
HOW MANY STANDARD DRINKS CONTAINING ALCOHOL DO YOU HAVE ON A TYPICAL DAY: 1 OR 2

## 2025-03-20 NOTE — PATIENT INSTRUCTIONS
adult-strength or 2 to 4 low-dose aspirin. Wait for an ambulance. Do not try to drive yourself.  Watch closely for changes in your health, and be sure to contact your doctor if you have any problems.  Where can you learn more?  Go to https://www.[employer].net/patientEd and enter F075 to learn more about \"A Healthy Heart: Care Instructions.\"  Current as of: July 31, 2024  Content Version: 14.4  © 5356-4209 [employer].   Care instructions adapted under license by [employer]. If you have questions about a medical condition or this instruction, always ask your healthcare professional. [employer] disclaims any warranty or liability for your use of this information.    Personalized Preventive Plan for Charlie MEJIA Sloan Martinez. - 3/20/2025  Medicare offers a range of preventive health benefits. Some of the tests and screenings are paid in full while other may be subject to a deductible, co-insurance, and/or copay.  Some of these benefits include a comprehensive review of your medical history including lifestyle, illnesses that may run in your family, and various assessments and screenings as appropriate.  After reviewing your medical record and screening and assessments performed today your provider may have ordered immunizations, labs, imaging, and/or referrals for you.  A list of these orders (if applicable) as well as your Preventive Care list are included within your After Visit Summary for your review.

## 2025-03-20 NOTE — PROGRESS NOTES
Medicare Annual Wellness Visit    Charlie Lisa  is here for Medicare AWV (Patient is here for a medicare wellness visit.) and Follow-Up from Hospital    Assessment & Plan   Medicare annual wellness visit, subsequent  Chest pain, unspecified type  SOB (shortness of breath)  Hyperlipidemia, unspecified hyperlipidemia type  -     Lipid Panel; Future  -     Comprehensive Metabolic Panel; Future  Thrombocytopenia, unspecified  -     CBC with Auto Differential; Future  Screening for prostate cancer  -     PSA Screening; Future       Return in about 6 months (around 9/20/2025).     Subjective       Patient's complete Health Risk Assessment and screening values have been reviewed and are found in Flowsheets. The following problems were reviewed today and where indicated follow up appointments were made and/or referrals ordered.    Positive Risk Factor Screenings with Interventions:              Inactivity:  On average, how many days per week do you engage in moderate to strenuous exercise (like a brisk walk)?: 0 days (!) Abnormal  On average, how many minutes do you engage in exercise at this level?: 0 min  Interventions:  See A/P for plan and any pertinent orders      Dentist Screen:  Have you seen the dentist within the past year?: (!) No    Intervention:  See A/P for any pertinent orders         Tobacco Use:    Tobacco Use      Smoking status: Some Days      Smokeless tobacco: Current     Interventions:  See counseling/recommendations below                      Objective   Vitals:    03/20/25 1329   BP: 130/78   Pulse: 68   Resp: 16   Temp: 98.9 °F (37.2 °C)   SpO2: 100%   Weight: 78.5 kg (173 lb)   Height: 1.753 m (5' 9\")      Body mass index is 25.55 kg/m².        General Appearance: alert and oriented to person, place and time, well developed and well- nourished, in no acute distress  Skin: warm and dry, no rash or erythema  Head: normocephalic and atraumatic  Eyes: pupils equal, round, and reactive to light,

## 2025-03-21 ENCOUNTER — RESULTS FOLLOW-UP (OUTPATIENT)
Facility: CLINIC | Age: 75
End: 2025-03-21

## 2025-03-21 LAB
ALBUMIN SERPL-MCNC: 4 G/DL (ref 3.5–5)
ALBUMIN/GLOB SERPL: 1.3 (ref 1.1–2.2)
ALP SERPL-CCNC: 72 U/L (ref 45–117)
ALT SERPL-CCNC: 38 U/L (ref 12–78)
ANION GAP SERPL CALC-SCNC: 6 MMOL/L (ref 2–12)
AST SERPL-CCNC: 18 U/L (ref 15–37)
BASOPHILS # BLD: 0.12 K/UL (ref 0–0.1)
BASOPHILS NFR BLD: 2.4 % (ref 0–1)
BILIRUB SERPL-MCNC: 0.7 MG/DL (ref 0.2–1)
BUN SERPL-MCNC: 8 MG/DL (ref 6–20)
BUN/CREAT SERPL: 8 (ref 12–20)
CALCIUM SERPL-MCNC: 9 MG/DL (ref 8.5–10.1)
CHLORIDE SERPL-SCNC: 107 MMOL/L (ref 97–108)
CHOLEST SERPL-MCNC: 273 MG/DL
CO2 SERPL-SCNC: 26 MMOL/L (ref 21–32)
CREAT SERPL-MCNC: 0.96 MG/DL (ref 0.7–1.3)
DIFFERENTIAL METHOD BLD: ABNORMAL
EOSINOPHIL # BLD: 0.1 K/UL (ref 0–0.4)
EOSINOPHIL NFR BLD: 2 % (ref 0–7)
ERYTHROCYTE [DISTWIDTH] IN BLOOD BY AUTOMATED COUNT: 12.5 % (ref 11.5–14.5)
GLOBULIN SER CALC-MCNC: 3.2 G/DL (ref 2–4)
GLUCOSE SERPL-MCNC: 85 MG/DL (ref 65–100)
HCT VFR BLD AUTO: 46.4 % (ref 36.6–50.3)
HDLC SERPL-MCNC: 70 MG/DL
HDLC SERPL: 3.9 (ref 0–5)
HGB BLD-MCNC: 15.6 G/DL (ref 12.1–17)
IMM GRANULOCYTES # BLD AUTO: 0.02 K/UL (ref 0–0.04)
IMM GRANULOCYTES NFR BLD AUTO: 0.4 % (ref 0–0.5)
LDLC SERPL CALC-MCNC: 172.6 MG/DL (ref 0–100)
LYMPHOCYTES # BLD: 1.14 K/UL (ref 0.8–3.5)
LYMPHOCYTES NFR BLD: 23.1 % (ref 12–49)
MCH RBC QN AUTO: 29.4 PG (ref 26–34)
MCHC RBC AUTO-ENTMCNC: 33.6 G/DL (ref 30–36.5)
MCV RBC AUTO: 87.4 FL (ref 80–99)
MONOCYTES # BLD: 0.46 K/UL (ref 0–1)
MONOCYTES NFR BLD: 9.3 % (ref 5–13)
NEUTS SEG # BLD: 3.1 K/UL (ref 1.8–8)
NEUTS SEG NFR BLD: 62.8 % (ref 32–75)
NRBC # BLD: 0 K/UL (ref 0–0.01)
NRBC BLD-RTO: 0 PER 100 WBC
PLATELET # BLD AUTO: 127 K/UL (ref 150–400)
PMV BLD AUTO: 12.5 FL (ref 8.9–12.9)
POTASSIUM SERPL-SCNC: 4 MMOL/L (ref 3.5–5.1)
PROT SERPL-MCNC: 7.2 G/DL (ref 6.4–8.2)
PSA SERPL-MCNC: 2.1 NG/ML (ref 0.01–4)
RBC # BLD AUTO: 5.31 M/UL (ref 4.1–5.7)
SODIUM SERPL-SCNC: 139 MMOL/L (ref 136–145)
TRIGL SERPL-MCNC: 152 MG/DL
VLDLC SERPL CALC-MCNC: 30.4 MG/DL
WBC # BLD AUTO: 4.9 K/UL (ref 4.1–11.1)

## 2025-03-21 RX ORDER — EVOLOCUMAB 140 MG/ML
140 INJECTION, SOLUTION SUBCUTANEOUS
Qty: 2.1 ML | Refills: 2 | Status: SHIPPED | OUTPATIENT
Start: 2025-03-21

## 2025-05-28 ENCOUNTER — TRANSCRIBE ORDERS (OUTPATIENT)
Facility: HOSPITAL | Age: 75
End: 2025-05-28

## 2025-05-28 DIAGNOSIS — R16.0 LIVER MASS: Primary | ICD-10-CM

## 2025-06-01 ENCOUNTER — HOSPITAL ENCOUNTER (OUTPATIENT)
Facility: HOSPITAL | Age: 75
Discharge: HOME | End: 2025-06-04
Attending: INTERNAL MEDICINE
Payer: MEDICARE

## 2025-06-01 DIAGNOSIS — R16.0 LIVER MASS: ICD-10-CM

## 2025-06-01 PROCEDURE — 76705 ECHO EXAM OF ABDOMEN: CPT

## 2025-08-25 ENCOUNTER — TRANSCRIBE ORDERS (OUTPATIENT)
Facility: HOSPITAL | Age: 75
End: 2025-08-25

## 2025-08-25 DIAGNOSIS — R10.9 RIGHT SIDED ABDOMINAL PAIN: ICD-10-CM

## 2025-08-25 DIAGNOSIS — K62.5 RECTAL BLEEDING: Primary | ICD-10-CM

## (undated) DEVICE — SINGLE-USE BIOPSY FORCEPS: Brand: RADIAL JAW 4